# Patient Record
Sex: MALE | Race: OTHER | Employment: FULL TIME | URBAN - METROPOLITAN AREA
[De-identification: names, ages, dates, MRNs, and addresses within clinical notes are randomized per-mention and may not be internally consistent; named-entity substitution may affect disease eponyms.]

---

## 2020-06-07 ENCOUNTER — APPOINTMENT (OUTPATIENT)
Dept: GENERAL RADIOLOGY | Facility: HOSPITAL | Age: 50
DRG: 177 | End: 2020-06-07
Attending: STUDENT IN AN ORGANIZED HEALTH CARE EDUCATION/TRAINING PROGRAM
Payer: COMMERCIAL

## 2020-06-07 ENCOUNTER — HOSPITAL ENCOUNTER (INPATIENT)
Facility: HOSPITAL | Age: 50
LOS: 5 days | Discharge: HOME | DRG: 177 | End: 2020-06-12
Attending: STUDENT IN AN ORGANIZED HEALTH CARE EDUCATION/TRAINING PROGRAM | Admitting: HOSPITALIST
Payer: COMMERCIAL

## 2020-06-07 DIAGNOSIS — E11.9 TYPE 2 DIABETES MELLITUS WITHOUT COMPLICATION, WITHOUT LONG-TERM CURRENT USE OF INSULIN (HCC): ICD-10-CM

## 2020-06-07 DIAGNOSIS — R07.9 ACUTE CHEST PAIN: ICD-10-CM

## 2020-06-07 DIAGNOSIS — U07.1 COVID-19 VIRUS INFECTION: Primary | ICD-10-CM

## 2020-06-07 DIAGNOSIS — R09.02 HYPOXIA: ICD-10-CM

## 2020-06-07 PROBLEM — J12.82 PNEUMONIA DUE TO COVID-19 VIRUS: Status: ACTIVE | Noted: 2020-06-07

## 2020-06-07 PROCEDURE — 99223 1ST HOSP IP/OBS HIGH 75: CPT | Performed by: INTERNAL MEDICINE

## 2020-06-07 PROCEDURE — 71045 X-RAY EXAM CHEST 1 VIEW: CPT | Performed by: STUDENT IN AN ORGANIZED HEALTH CARE EDUCATION/TRAINING PROGRAM

## 2020-06-07 RX ORDER — GLIPIZIDE 5 MG/1
5 TABLET ORAL
COMMUNITY

## 2020-06-07 RX ORDER — ONDANSETRON 2 MG/ML
4 INJECTION INTRAMUSCULAR; INTRAVENOUS EVERY 6 HOURS PRN
Status: DISCONTINUED | OUTPATIENT
Start: 2020-06-07 | End: 2020-06-12

## 2020-06-07 RX ORDER — SODIUM CHLORIDE 9 MG/ML
INJECTION, SOLUTION INTRAVENOUS CONTINUOUS
Status: ACTIVE | OUTPATIENT
Start: 2020-06-07 | End: 2020-06-07

## 2020-06-07 RX ORDER — SODIUM CHLORIDE 9 MG/ML
125 INJECTION, SOLUTION INTRAVENOUS CONTINUOUS
Status: DISCONTINUED | OUTPATIENT
Start: 2020-06-07 | End: 2020-06-07

## 2020-06-07 RX ORDER — METOCLOPRAMIDE HYDROCHLORIDE 5 MG/ML
10 INJECTION INTRAMUSCULAR; INTRAVENOUS EVERY 8 HOURS PRN
Status: DISCONTINUED | OUTPATIENT
Start: 2020-06-07 | End: 2020-06-12

## 2020-06-07 RX ORDER — ENOXAPARIN SODIUM 100 MG/ML
40 INJECTION SUBCUTANEOUS DAILY
Status: DISCONTINUED | OUTPATIENT
Start: 2020-06-07 | End: 2020-06-07

## 2020-06-07 RX ORDER — DEXTROSE MONOHYDRATE 25 G/50ML
50 INJECTION, SOLUTION INTRAVENOUS
Status: DISCONTINUED | OUTPATIENT
Start: 2020-06-07 | End: 2020-06-12

## 2020-06-07 RX ORDER — SODIUM CHLORIDE 9 MG/ML
INJECTION, SOLUTION INTRAVENOUS ONCE
Status: DISCONTINUED | OUTPATIENT
Start: 2020-06-07 | End: 2020-06-12

## 2020-06-07 RX ORDER — ENOXAPARIN SODIUM 100 MG/ML
0.5 INJECTION SUBCUTANEOUS DAILY
Status: DISCONTINUED | OUTPATIENT
Start: 2020-06-07 | End: 2020-06-12

## 2020-06-07 NOTE — PROGRESS NOTES
NINO HOSPITALIST  Progress Note     Paulinafaiza Wades Patient Status:  Inpatient    1970 MRN [id number]   [facility] 3SW-A Attending Edith Guillen, [address] [facility] Day # 0 PCP No primary care provider on file.      Chief Complaint: SOB    S: Pa Insulin Aspart Pen  2-10 Units Subcutaneous TID CC and HS   • enoxaparin  0.5 mg/kg Subcutaneous Daily       ASSESSMENT / PLAN:     1. Hypoxia secondary to COVID19 viral pneumonia  1. S/p tocilizumab  2. Remdesivir  3. CCP  4.  Monitor inflammatory markers

## 2020-06-07 NOTE — H&P
NINO HOSPITALIST  History and Physical     Shaandilcia Geller Patient Status:  Emergency    1970 MRN [id number]   Location [address] [facility] Attending John Duque MD   Hosp Day # 0 PCP No primary care provider on file.       Disp: , Rfl:   benzonatate 100 MG Oral Cap, Take 1 capsule (100 mg total) by mouth 3 (three) times daily as needed. , Disp: 30 capsule, Rfl: 0        Review of Systems:   A comprehensive 14 point review of systems was completed.     Pertinent positives and n positioning  2. IS  3. Supplemental oxygen  3. Type II DM  1. Hold PO meds  2. Levemir/ISS  4. Morbid Obesity  1.  BMI ~48    Quality:  · DVT Prophylaxis: Lovenox  · CODE status: Full  · Dunbar: no    Plan of care discussed with patient/ED MD Gregory Mcgowan

## 2020-06-07 NOTE — CONSULTS
Pulmonary / Critical Care H&P/Consult       NAME: Claudene Apo - ROOM: [address] [place] [address] MRN: [id number] - Age: 44year old - :  1970    Date of Admission: 2020  3:18 AM  Admission Diagnosis: Hypoxia [R09.02]  Acute chest pain [R07.9]  Type 2 diabete Relationships      Social connections:        Talks on phone: Not on file        Gets together: Not on file        Attends [employer] service: Not on file        Active member of club or organization: Not on file        Attends meetings of clubs or [place] Output —   Net 1250 ml       /66 (BP Location: Left arm)   Pulse 78   Temp 98.3 °F (36.8 °C) (Oral)   Resp 24   Ht 5' 7\" (1.702 m)   Wt (!) 305 lb (138.3 kg)   SpO2 93%   BMI 47.77 kg/m²     General Appearance:    Alert, cooperative, no distress, infiltrates    ASSESSMENT/PLAN:    1. Dyspnea / hypoxia: secondary to covid-19 infection  - O2 as needed  - recommend that pt self prone; change positions q2 hours. 2. COVID-19 infection  - sx onset: 5/27;  Dx made 6/1  - exposures: no clear exposure  - O

## 2020-06-07 NOTE — ED PROVIDER NOTES
Patient Seen in: [facility] Emergency Department      History   Patient presents with:  Dyspnea MATIAS SOB  Cough/URI  Chest Pain Angina    Stated Complaint: matias/cough/chest pain with cough    HPI    Patient is a 47-year-old male with previous history well-nourished. HENT:   Head: Normocephalic and atraumatic. Eyes: Pupils are equal, round, and reactive to light. Neck: Normal range of motion. Neck supple. Cardiovascular: Normal rate, regular rhythm, normal heart sounds and intact distal pulses. infection and other pathology. Chest x-ray demonstrated findings consistent with extensive bilateral groundglass opacities consistent with severe lung involvement secondary to COVID-19 infection.     Laboratory evaluation was also undertaken, elevation i

## 2020-06-07 NOTE — ED INITIAL ASSESSMENT (HPI)
Pt reports testing + for COVID 19 6/1/20. Arrived with c/o cough with matias and chest pain with cough x couple episodes tonight.  Sats85% room air on arrival.

## 2020-06-07 NOTE — PLAN OF CARE
Assumed care of patient around 0730. Pt a/o x 4, requiring 3L nasal cannula this a.m. with increased to 5L during ambulation. Denies chest pain/discomfort or SOB. C/o \"itching\" to his chest. Encouraged IS use. Pt given Actemra x1. Remdesivir daily.  Dandy

## 2020-06-07 NOTE — PLAN OF CARE
NURSING ADMISSION NOTE      Patient admitted via Cart  Oriented to room. Safety precautions initiated. Bed in low position. Call light in reach. Assumed care around 0530. Pt A&Ox4. VSS. 3L O2 sating mid to upper 90's. NSR on tele.    C/o mild

## 2020-06-07 NOTE — CONSULTS
INFECTIOUS DISEASE CONSULT NOTE    Stefany Ellington Patient Status:  Inpatient    1970 MRN [id number]   [facility] 3SW-A Attending Malinda Pichardo, [address] [facility] Day # 0 PCP No primary car PRN  •  Metoclopramide HCl (REGLAN) injection 10 mg, 10 mg, Intravenous, Q8H PRN  •  insulin detemir (LEVEMIR) 100 UNIT/ML flextouch 10 Units, 10 Units, Subcutaneous, Daily  •  Insulin Aspart Pen (NOVOLOG) 100 UNIT/ML flexpen 2-10 Units, 2-10 Units, Subcut 28  --    ALT 34  --    BILT 0.7  --    TP 7.6  --        Microbiology    Reviewed in EMR    Radiology: Reviewed      Antibiotics:       ASSESSMENT:  44year old male w/ PMH sig for DMII who presented to ED on 6/7 with SOB.   Had sx starting 5/27 with cough

## 2020-06-07 NOTE — PROGRESS NOTES
[facility] Pharmacy Note:  Anticoagulation Weight Dose Adjustment for enoxaparin (LOVENOX)    Tay Garduno is a 44year old male who has been prescribed enoxaparin (LOVENOX) 40 mg every 24 hours.       Estimated Creatinine Clearance: 123.8 mL/min (A) (based on S

## 2020-06-08 ENCOUNTER — APPOINTMENT (OUTPATIENT)
Dept: CT IMAGING | Facility: HOSPITAL | Age: 50
DRG: 177 | End: 2020-06-08
Attending: INTERNAL MEDICINE
Payer: COMMERCIAL

## 2020-06-08 PROCEDURE — 30233L1 TRANSFUSION OF NONAUTOLOGOUS FRESH PLASMA INTO PERIPHERAL VEIN, PERCUTANEOUS APPROACH: ICD-10-PCS | Performed by: HOSPITALIST

## 2020-06-08 PROCEDURE — 71275 CT ANGIOGRAPHY CHEST: CPT | Performed by: INTERNAL MEDICINE

## 2020-06-08 PROCEDURE — 99232 SBSQ HOSP IP/OBS MODERATE 35: CPT | Performed by: HOSPITALIST

## 2020-06-08 NOTE — PROGRESS NOTES
NINO HOSPITALIST  Progress Note     Anurag Rowe Patient Status:  Inpatient    1970 MRN [id number]   [facility] 3SW-A Attending Marium Gordillo, [address] [facility] Day # 1 PCP No primary care provider on file.      Chief Complaint: SOB    S: Pa Epic.    Medications:   • remdesivir IVPB  100 mg Intravenous Q24H   • insulin detemir  10 Units Subcutaneous Daily   • Insulin Aspart Pen  2-10 Units Subcutaneous TID CC and HS   • enoxaparin  0.5 mg/kg Subcutaneous Daily   • sodium chloride   Intravenous

## 2020-06-08 NOTE — PROGRESS NOTES
[facility]                INFECTIOUS DISEASE PROGRESS NOTE    Sunil Zafar Patient Status:  Inpatient    1970 MRN [id number]   [facility] 3SW-A Attending Shabbir Segura, [address] [facility] Day # 1 PCP No primary care provider on file. [address] Encounter on 06/07/20   1.  BLOOD CULTURE     Status: None (Preliminary result)    Collection Time: 06/07/20  6:33 AM   Result Value Ref Range    Blood Culture Result No Growth 1 Day N/A         Radiology    CXR bilateral judie Left PTA s/p I&D at bedside  -Augmentin x 10 days  -Medrol dose pack  -FU in office if symptoms recur or worsen

## 2020-06-08 NOTE — PLAN OF CARE
Assumed care at [address] p ton bed, on O2 3l/NC sats >90  Encouraged proning. IS - 1500  Denies pain, mild SOB on exertion  On Remdesivir, need convalescent plasma  Labs in am  POC updated. Verbalized undertsnding    Problem: Diabetes/Glucose Control  Go signs/symptoms of CO2 retention  Outcome: Progressing     Problem: DISCHARGE PLANNING  Goal: Discharge to home or other facility with appropriate resources  Description  INTERVENTIONS:  - Identify barriers to discharge w/pt and caregiver  - Include patient

## 2020-06-08 NOTE — PAYOR COMM NOTE
--------------  ADMISSION REVIEW     Payor: [address] [place] [address] Drive #:  [id number]  Authorization Number: [id number]    Admit date: 6/7/20  Admit time: 0530       Admitting Physician: Paulino Perea MD  Attending Physician:  DO Pj Doherty Other systems are as noted in HPI. Constitutional and vital signs reviewed. All other systems reviewed and negative except as noted above.     Physical Exam     ED Triage Vitals [06/07/20 0325]   /64   Pulse 85   Resp 22   Temp 97.5 °F (36.4 °C) Please view results for these tests on the individual orders.    TROPONIN I   PROTHROMBIN TIME (PT)   PTT, ACTIVATED   D-DIMER   LDH   PROCALCITONIN   FERRITIN   C-REACTIVE PROTEIN   CK CREATINE KINASE (NOT CREATININE)   SCAN SLIDE   RAINBOW DRAW BLUE   FORTE Current Discharge Medication List                                    Signed by Chris Black MD on 6/7/2020  4:57 AM            H&P - H&P Note      H&P signed by Mick East DO at 6/7/2020  5:48 AM     Author:  Mick East DO Service:  — Author T Social History:  reports that he has never smoked. He has never used smokeless tobacco. He reports previous alcohol use. He reports that he does not use drugs.     Family History: Father with Type II DM    Allergies: No Known Allergies    Medications:  No c BILT 0.7   TP 7.6       Estimated Creatinine Clearance: 123.8 mL/min (A) (based on SCr of 0.66 mg/dL (L)).     Recent Labs   Lab 06/07/20 0334   PTP 13.3   INR 0.98       Recent Labs   Lab 06/07/20 0334   TROP <0.045   CK 83       Imaging: Imaging data re ED Consult to Infectious Disease [[id number]] ordered by Lincoln Auguste MD at 06/07/20 4993          Signed                                                                     INFECTIOUS DISEASE CONSULT NOTE           Shawnee Miles Patient Status:  Marlene Kiser •  glucose (DEX4) oral liquid 15 g, 15 g, Oral, Q15 Min PRN **OR** Glucose-Vitamin C (DEX-4) chewable tab 4 tablet, 4 tablet, Oral, Q15 Min PRN **OR** dextrose 50 % injection 50 mL, 50 mL, Intravenous, Q15 Min PRN **OR** glucose (DEX4) oral liquid 30 g, 30 Integument: No lesions.  No erythema.     Laboratory Data:         Recent Labs   Lab 06/07/20  0633   RBC 4.94   HGB 13.8   HCT 42.3   MCV 85.6   MCH 27.9   MCHC 32.6   RDW 12.5   NEPRELIM 4.84   WBC 6.9   .0           Recent Labs   Lab 06/07/20  033 Thank you for allowing me to participate in the care of this patient. Please do not hesitate to call if you have any questions.    I will continue to follow with you and will make further recommendations based on his progress.     Cortney Avery  6/7/202 Socioeconomic History      Marital status:       Spouse name: Not on file      Number of children: Not on file      Years of education: Not on file      Highest education level: Not on file    Occupational History      Not on file    Social Needs • Insulin Aspart Pen  2-10 Units Subcutaneous TID CC and HS   • enoxaparin  0.5 mg/kg Subcutaneous Daily      Continuous Infusing Medication:  PRN Medication:glucose **OR** Glucose-Vitamin C **OR** dextrose **OR** glucose **OR** Glucose-Vitamin C, ondanset Skin:   Skin color, texture, turgor normal, no rashes or lesions                 Recent Labs   Lab 06/07/20 0334 06/07/20  0633   WBC 8.7 6.9   HGB 14.2 13.8   HCT 43.8 42.3   .0 291.0          Recent Labs   Lab 06/07/20 0334   INR 0.98

## 2020-06-08 NOTE — PLAN OF CARE
Patient is alert and oriented, VSS, no c/o of pain, dyspnea upon exertion, O2 maintained between 93-95% on 3L NC. Up ad chelsi. Convalescent plasma administered, no s/s of adverse reaction. CTA chest completed, neg for PE, MD aware. All needs met.      Problem Manage/alleviate anxiety  - Monitor for signs/symptoms of CO2 retention  Outcome: Progressing     Problem: DISCHARGE PLANNING  Goal: Discharge to home or other facility with appropriate resources  Description  INTERVENTIONS:  - Identify barriers to dischar

## 2020-06-08 NOTE — PROGRESS NOTES
Lääne 62 Patient Status:  Inpatient    1970 MRN [id number]   [facility] 3SW-A Attending Dara Brock, [address] [facility] Day # 1 PCP No primary care provider on file. SUBJECTIVE: Pt afebrile.   Denies complaints toda                         Chest wall: No tenderness or deformity.                         GLENQ: GUEIYOY rate and rhythm, normal S1S2                          Abdomen: soft, non-tender, non-distended, positive BS.                           Extremity: No cl 5x ULN, will continue prophy dose lovenox   - s/p tocilizumab 6/7  - on remdesivir per ID  - awaiting convalescent plasma  - consider steriods if there is further deterioration. 3. Proph:  -lovenox  4.  Dispo:  -full code  -will follow     Jayne Esqueda

## 2020-06-09 PROCEDURE — 99232 SBSQ HOSP IP/OBS MODERATE 35: CPT | Performed by: HOSPITALIST

## 2020-06-09 RX ORDER — FLUTICASONE PROPIONATE 50 MCG
1 SPRAY, SUSPENSION (ML) NASAL DAILY
Status: DISCONTINUED | OUTPATIENT
Start: 2020-06-09 | End: 2020-06-12

## 2020-06-09 NOTE — PLAN OF CARE
Pt is AOx4, denies chest pain or dyspnea. He does desat with activity. He has a non-productive cough which he states is from post nasal drip related to his allergies. On 3L. QID accucheck. Up ad chelsi. Will monitor.     0115: O2 increased to 5L while a

## 2020-06-09 NOTE — PAYOR COMM NOTE
--------------  CONTINUED STAY REVIEW-----CLINICALS FOR 6/8      Payor: [address] [place] [address] Drive #:  [id number]  Authorization Number: [id number]    Admit date: 6/7/20  Admit time: [place]    Admitting Physician: Billy Macdonald MD  Attending Physician: •  Insulin Aspart Pen (NOVOLOG) 100 UNIT/ML flexpen 2-10 Units, 2-10 Units, Subcutaneous, TID CC and HS  •  Enoxaparin Sodium (LOVENOX) 80 MG/0.8ML injection 70 mg, 0.5 mg/kg, Subcutaneous, Daily  •  0.9% NaCl infusion, , Intravenous, Once      Physical Ex 3604 06/08/20  0533   CRP 14.40* 10.60*   ANNELISE 448.5 401.7   * 372*   DDIMER 1.79* 1.97*            Imaging:  I have independently visualized all relevant chest imaging in PACS.   I agree with the radiology interpretation except where noted.        AS Cardiovascular: S1, S2. Regular rate and rhythm. No murmurs, rubs or gallops. Abdomen: Soft, nontender, nondistended. Positive bowel sounds. No rebound or guarding. Neurologic: No focal neurological deficits. Musculoskeletal: Moves all extremities. At this point Mr. Marii Means is expected to be discharge to: [address] [place],9D of care discussed with patient and RN.     Francesca Hennessy,                           MEDICATIONS ADMINISTERED IN LAST 1 DAY:  Enoxaparin Sodium (LOVENOX) 80 MG/0.8ML injection 70 mg     D

## 2020-06-09 NOTE — PROGRESS NOTES
[facility]                INFECTIOUS DISEASE PROGRESS NOTE    Paulina Jones Patient Status:  Inpatient    1970 MRN [id number]   [facility] 3SW-A Attending Edith Guillen, [address] [facility] [address] Day # 2 PCP No primary care provider on file. 6:33 AM   Result Value Ref Range    Blood Culture Result No Growth 2 Days N/A         Radiology    CXR bilateral alveolar infiltrates    Problem list reviewed:  Patient Active Problem List:     FGBFD-05 virus infection     Hypoxia     Acute chest pain

## 2020-06-09 NOTE — PROGRESS NOTES
NINO HOSPITALIST  Progress Note     Griselda Russ Patient Status:  Inpatient    1970 MRN [id number]   [facility] 3SW-A Attending Bhavesh Saul, [address] [facility] Day # 2 PCP No primary care provider on file.      Chief Complaint: SOB    S: Pa INR 0.98       Recent Labs   Lab 06/07/20  0334   TROP <0.045   CK 83            Imaging: Imaging data reviewed in Epic.     Medications:   • Fluticasone Propionate  1 spray Each Nare Daily   • remdesivir IVPB  100 mg Intravenous Q24H   • insulin detemir

## 2020-06-09 NOTE — PLAN OF CARE
Assumed care of patient at 7am  AOx4 in NAD  Weaning of oxygen currently at 1L;   IS 2200  Lungs diminished; still with cough  Abdomen soft; voids;    Denies pain  Discharge planning ongoing; patient asking pertinent questions about discharge self quarantin interpreters to assist at discharge as needed  - Consider post-discharge preferences of patient/family/discharge partner  - Complete POLST form as appropriate  - Assess patient's ability to be responsible for managing their own health  - Refer to [facility]

## 2020-06-09 NOTE — PROGRESS NOTES
Lääne 62 Patient Status:  Inpatient    1970 MRN [id number]   [facility] 3SW-A Attending Rubén Broussard, 160[address] [facility] Day # 2 PCP No primary care provider on file. SUBJECTIVE: Pt states that SOB is improving.   Fe mucous membranes                          Lungs: Clear to auscultation bilaterally, no wheezes or crackles                           Chest wall: No tenderness or deformity.                           RDVMM: YJJRDFN rate and rhythm, normal S1S2                parenchymal disease from COVID  2. COVID-19 infection  - sx onset: 5/27;  Dx made 6/1  - exposures: no clear exposure  - O2 as above  - inflammatory labs downtrending   - d-dimer elevated although not > 5x ULN, will continue prophy dose lovenox   - s/p toci

## 2020-06-10 PROCEDURE — 99232 SBSQ HOSP IP/OBS MODERATE 35: CPT | Performed by: HOSPITALIST

## 2020-06-10 NOTE — PLAN OF CARE
Patient A/Ox4, VSS, denies pain or discomfort. Maintained O2 above 92% 1L NC, increased need for O2 NOC (up to 4L for LEONARD). Up ad chelsi, slight dyspnea, patient using I/S throughout day and noted that his post nasal drip has improved since using Flonase.  All

## 2020-06-10 NOTE — PROGRESS NOTES
Lääne 62 Patient Status:  Inpatient    1970 MRN [id number]   [facility] 3SW-A Attending Nany Isaac, [address] [facility] Day # 3 PCP No primary care provider on file. SUBJECTIVE: No acute events overnight.   He denie wall: No tenderness or deformity.                         DLTLL: EJMPLPT rate and rhythm, normal S1S2                          Abdomen: soft, non-tender, non-distended, positive BS.                         Extremity: No clubbing or cyanosis.  no edema     O2 as above  - inflammatory labs downtrending, LDH up a bit today   - d-dimer elevated although not > 5x ULN, will continue prophy dose lovenox   - s/p tocilizumab 6/7  - on remdesivir per ID, holding tomorrow due to rise in LFTs  - received convalescent p

## 2020-06-10 NOTE — PLAN OF CARE
Assumed care 1900  Patient alert and oriented x4  No complaints of pain  Normal sinus on tele  Oxygen needs varying- 5L required while sleeping and 1 L while awake  Blood sugars controlled   Remdesivir given  Will monitor

## 2020-06-10 NOTE — PROGRESS NOTES
[facility]                INFECTIOUS DISEASE PROGRESS NOTE    Francisco Khan Patient Status:  Inpatient    1970 MRN [id number]   [facility] 3SW-A Attending Lilliam Pop, [address] [facility] Day # 3 PCP No primary care provider on file. N/A         Radiology    CXR bilateral alveolar infiltrates    Problem list reviewed:  Patient Active Problem List:     NIHZC-08 virus infection     Hypoxia     Acute chest pain     Type 2 diabetes mellitus without complication, without long-term current u

## 2020-06-10 NOTE — PROGRESS NOTES
NINO HOSPITALIST  Progress Note     Flavio Chao Patient Status:  Inpatient    1970 MRN [id number]   [facility] 3SW-A Attending Basim Edward MD   Hosp Day # 3 PCP No primary care provider on file.      Chief Complaint: SOB    S: Pa COVID-19 Lab Results    COVID-19  No results found for: COVID19    Pro-Calcitonin  Recent Labs   Lab 06/07/20  0334   PCT 0.09       Cardiac  Recent Labs   Lab 06/07/20  0334 06/07/20  0633   TROP <0.045  --    PBNP  --  57       Creatinine Anusha

## 2020-06-11 PROCEDURE — 99232 SBSQ HOSP IP/OBS MODERATE 35: CPT | Performed by: HOSPITALIST

## 2020-06-11 NOTE — PROGRESS NOTES
NINO HOSPITALIST  Progress Note     Eric Bhat Patient Status:  Inpatient    1970 MRN [id number]   [facility] 3SW-A Attending Bhupinder Stevenson MD   Hosp Day # 4 PCP No primary care provider on file.      Chief Complaint: SOB    S: Pa INR 0.98            COVID-19 Lab Results    COVID-19  No results found for: COVID19    Pro-Calcitonin  Recent Labs   Lab 06/07/20  0334   PCT 0.09       Cardiac  Recent Labs   Lab 06/07/20  0334 06/07/20  0633   TROP <0.045  --    PBNP  --  57       Crea

## 2020-06-11 NOTE — PLAN OF CARE
RECEIVED PATIENT RESTING IN BED. PATIENT ALERT AND ORIENTED X4. VSS ON 1L O2.  LUNGS DIMINISHED. DENIES SOB AT REST. SOB WITH EXERTION IMPROVING PER PATIENT. SR ON TELE. DENIES CHEST PAIN. PATIENT UP AS TOLERATED. PLAN OF CARE UPDATED WITH PATIENT.

## 2020-06-11 NOTE — PROGRESS NOTES
[facility]                INFECTIOUS DISEASE PROGRESS NOTE    Francisco Khan Patient Status:  Inpatient    1970 MRN [id number]   [facility] 3SW-A Attending Lilliam Pop, [address] [facility] [address] Day # 4 PCP No primary care provider on file. 0.5   TP 7.0 6.9 7.1       No results found for: [facility] Encounter on 06/07/20   1.  BLOOD CULTURE     Status: None (Preliminary result)    Collection Time: 06/07/20  6:33 AM   Result Value Ref Range    Blood Culture Result No Growth

## 2020-06-11 NOTE — PLAN OF CARE
Patient is alert and oriented. Weaned to 1L NC, NSR on the tele. Denies any pain or SOB. POC updated with the patient, all questions answered. Call light within reach, will continue to monitor.

## 2020-06-12 VITALS
DIASTOLIC BLOOD PRESSURE: 85 MMHG | HEART RATE: 82 BPM | SYSTOLIC BLOOD PRESSURE: 144 MMHG | TEMPERATURE: 98 F | HEIGHT: 67 IN | OXYGEN SATURATION: 94 % | RESPIRATION RATE: 18 BRPM | WEIGHT: 295.88 LBS | BODY MASS INDEX: 46.44 KG/M2

## 2020-06-12 PROCEDURE — 99239 HOSP IP/OBS DSCHRG MGMT >30: CPT | Performed by: HOSPITALIST

## 2020-06-12 RX ORDER — FLUTICASONE PROPIONATE 50 MCG
1 SPRAY, SUSPENSION (ML) NASAL DAILY
Qty: 1 BOTTLE | Refills: 0 | Status: SHIPPED | OUTPATIENT
Start: 2020-06-13

## 2020-06-12 NOTE — PLAN OF CARE
Patient tele dc'd. Iv discontinued with catheter intact. Patient denies chest pain, sob, dizziness or palpations. Patient denies calf tenderness, patient discharge instructions reviewed with patient and verbalized understanding.  Patient medication reviewed

## 2020-06-12 NOTE — PROGRESS NOTES
NINO HOSPITALIST  Progress Note     Eric Bhat Patient Status:  Inpatient    1970 MRN [id number]   [facility] 3SW-A Attending Bhupinder Stevenson MD   Hosp Day # 5 PCP No primary care provider on file.      Chief Complaint: SOB    S: Pa Results    COVID-19  No results found for: COVID19    Pro-Calcitonin  Recent Labs   Lab 06/07/20  0334   PCT 0.09       Cardiac  Recent Labs   Lab 06/07/20  0334 06/07/20  0633   TROP <0.045  --    PBNP  --  57       Creatinine Kinase  Recent Labs   Lab 06

## 2020-06-12 NOTE — DISCHARGE SUMMARY
NINO HOSPITALIST  DISCHARGE SUMMARY     Flavio Chao Patient Status:  Inpatient    1970 MRN [id number]   [facility] 5NW-A Attending No att. providers found   [facility] Day # 5 PCP No primary care provider on file.      Date of Admission: discharge from the hospital; Still recommend for TCM follow-up.     Procedures during hospitalization:   • none    Incidental or significant findings and recommendations (brief descriptions):  • As above    Lab/Test results pending at Discharge:   · none to auscultation bilaterally. No wheezes. No rhonchi. Cardiovascular: S1, S2. Regular rate and rhythm. No murmurs, rubs or gallops. Abdomen: Soft, nontender, nondistended. Positive bowel sounds. No rebound or guarding.   Neurologic: No focal neurological

## 2020-06-12 NOTE — PROGRESS NOTES
[address] 62 Patient Status:  Inpatient    1970 MRN [id number]   [facility] 3SW-A Attending Maggie Le, [address] Day # 5 PCP No primary care provider on file.      SUBJECTIVE: Pt tolerated RA all day yesterday, d Trochanteric Bursitis: Exercises  Your Care Instructions  Here are some examples of typical rehabilitation exercises for your condition. Start each exercise slowly. Ease off the exercise if you start to have pain. Your doctor or physical therapist will tell you when you can start these exercises and which ones will work best for you. How to do the exercises  Hamstring wall stretch    1. Lie on your back in a doorway, with your good leg through the open door. 2. Slide your affected leg up the wall to straighten your knee. You should feel a gentle stretch down the back of your leg. 1. Do not arch your back. 2. Do not bend either knee. 3. Keep one heel touching the floor and the other heel touching the wall. Do not point your toes. 3. Hold the stretch for at least 1 minute to begin. Then try to lengthen the time you hold the stretch to as long as 6 minutes. 4. Repeat 2 to 4 times. 5. If you do not have a place to do this exercise in a doorway, there is another way to do it:  6. Lie on your back, and bend the knee of your affected leg. 7. Loop a towel under the ball and toes of that foot, and hold the ends of the towel in your hands. 8. Straighten your knee, and slowly pull back on the towel. You should feel a gentle stretch down the back of your leg. 9. Hold the stretch for 15 to 30 seconds. Or even better, hold the stretch for 1 minute if you can. 10. Repeat 2 to 4 times. Straight-leg raises to the outside    1. Lie on your side, with your affected leg on top. 2. Tighten the front thigh muscles of your top leg to keep your knee straight. 3. Keep your hip and your leg straight in line with the rest of your body, and keep your knee pointing forward. Do not drop your hip back. 4. Lift your top leg straight up toward the ceiling, about 12 inches off the floor. Hold for about 6 seconds, then slowly lower your leg. 5. Repeat 8 to 12 times. Clamshell    1.  Lie on your side, with your affected leg on membranes                          Lungs: Clear to auscultation bilaterally, no wheezes or crackles                           Chest wall: No tenderness or deformity.                           HKWFU: ZTUJAWR rate and rhythm, normal S1S2                       top and your head propped on a pillow. Keep your feet and knees together and your knees bent. 2. Raise your top knee, but keep your feet together. Do not let your hips roll back. Your legs should open up like a clamshell. 3. Hold for 6 seconds. 4. Slowly lower your knee back down. Rest for 10 seconds. 5. Repeat 8 to 12 times. Standing quadriceps stretch    1. If you are not steady on your feet, hold on to a chair, counter, or wall. You can also lie on your stomach or your side to do this exercise. 2. Bend the knee of the leg you want to stretch, and reach behind you to grab the front of your foot or ankle with the hand on the same side. For example, if you are stretching your right leg, use your right hand. 3. Keeping your knees next to each other, pull your foot toward your buttock until you feel a gentle stretch across the front of your hip and down the front of your thigh. Your knee should be pointed directly to the ground, and not out to the side. 4. Hold the stretch for 15 to 30 seconds. 5. Repeat 2 to 4 times. Piriformis stretch    1. Lie on your back with your legs straight. 2. Lift your affected leg and bend your knee. With your opposite hand, reach across your body, and then gently pull your knee toward your opposite shoulder. 3. Hold the stretch for 15 to 30 seconds. 4. Repeat 2 to 4 times. Double knee-to-chest    1. Lie on your back with your knees bent and your feet flat on the floor. You can put a small pillow under your head and neck if it is more comfortable. 2. Bring both knees to your chest.  3. Keep your lower back pressed to the floor. Hold for 15 to 30 seconds. 4. Relax, and lower your knees to the starting position. 5. Repeat 2 to 4 times. Follow-up care is a key part of your treatment and safety. Be sure to make and go to all appointments, and call your doctor if you are having problems.  It's also a good idea to know your test results and keep a list of the medicines you take.  Where can you learn more? Go to http://charles-kristin.info/. Enter P262 in the search box to learn more about \"Trochanteric Bursitis: Exercises. \"  Current as of: March 21, 2017  Content Version: 11.4  © 3113-3146 [employer]. Care instructions adapted under license by [employer] (which disclaims liability or warranty for this information). If you have questions about a medical condition or this instruction, always ask your healthcare professional. [place] [address] any warranty or liability for your use of this information. Sacroiliac Pain: Exercises  Your Care Instructions  Here are some examples of typical rehabilitation exercises for your condition. Start each exercise slowly. Ease off the exercise if you start to have pain. Your doctor or physical therapist will tell you when you can start these exercises and which ones will work best for you. How to do the exercises  Knee-to-chest stretch    11. Do not do the knee-to-chest exercise if it causes or increases back or leg pain. 12. Lie on your back with your knees bent and your feet flat on the floor. You can put a small pillow under your head and neck if it is more comfortable. 15. Grasp your hands under one knee and bring the knee to your chest, keeping the other foot flat on the floor. 14. Keep your lower back pressed to the floor. Hold for at least 15 to 30 seconds. 15. Relax and lower the knee to the starting position. Repeat with the other leg. 16. Repeat 2 to 4 times with each leg. 17. To get more stretch, keep your other leg flat on the floor while pulling your knee to your chest.  Bridging    6. Lie on your back with both knees bent. Your knees should be bent about 90 degrees. 7. Tighten your belly muscles by pulling in your belly button toward your spine.  Then push your feet into the floor, squeeze your buttocks, and lift your hips off the floor until your shoulders, hips, will continue prophy dose lovenox   - s/p tocilizumab 6/7  - on remdesivir per ID, holding due to rise in LFTs  - received convalescent plasma 6/9  3. LEONARD:  -noncompliant with CPAP as OP, hold for now given COVID   4. Proph:  -lovenox  5.  Dispo:  -full cod and knees are all in a straight line. 8. Hold for about 6 seconds as you continue to breathe normally, and then slowly lower your hips back down to the floor and rest for up to 10 seconds. 9. Repeat 8 to 12 times. Hip extension    6. Get down on your hands and knees on the floor. 7. Keeping your back and neck straight, lift one leg straight out behind you. When you lift your leg, keep your hips level. Don't let your back twist, and don't let your hip drop toward the floor. 8. Hold for 6 seconds. Repeat 8 to 12 times with each leg. 9. If you feel steady and strong when you do this exercise, you can make it more difficult. To do this, when you lift your leg, also lift the opposite arm straight out in front of you. For example, lift the left leg and the right arm at the same time. (This is sometimes called the \"bird dog exercise. \") Hold for 6 seconds, and repeat 8 to 12 times on each side. Clamshell    6. Lie on your side with a pillow under your head. Keep your feet and knees together and your knees bent. 7. Raise your top knee, but keep your feet together. Do not let your hips roll back. Your legs should open up like a clamshell. 8. Hold for 6 seconds. 9. Slowly lower your knee back down. Rest for 10 seconds. 10. Repeat 8 to 12 times. 11. Switch to your other side and repeat steps 1 through 5. Hamstring wall stretch    5. Lie on your back in a doorway, with one leg through the open door. 6. Slide your affected leg up the wall to straighten your knee. You should feel a gentle stretch down the back of your leg. 1. Do not arch your back. 2. Do not bend either knee. 3. Keep one heel touching the floor and the other heel touching the wall. Do not point your toes. 7. Hold the stretch for at least 1 minute to begin. Then try to lengthen the time you hold the stretch to as long as 6 minutes. 8. Switch legs, and repeat steps 1 through 3.  9. Repeat 2 to 4 times.   10. If you do not have a place to do this exercise in a doorway, there is another way to do it:  11. Lie on your back, and bend one knee. 12. Loop a towel under the ball and toes of that foot, and hold the ends of the towel in your hands. 13. Straighten your knee, and slowly pull back on the towel. You should feel a gentle stretch down the back of your leg. 14. Switch legs, and repeat steps 1 through 3.  15. Repeat 2 to 4 times. Lower abdominal strengthening    6. Lie on your back with your knees bent and your feet flat on the floor. 7. Tighten your belly muscles by pulling your belly button in toward your spine. 8. Lift one foot off the floor and bring your knee toward your chest, so that your knee is straight above your hip and your leg is bent like the letter \"L. \"  9. Lift the other knee up to the same position. 10. Lower one leg at a time to the starting position. 11. Keep alternating legs until you have lifted each leg 8 to 12 times. 12. Be sure to keep your belly muscles tight and your back still as you are moving your legs. Be sure to breathe normally. Piriformis stretch    1. Lie on your back with your legs straight. 2. Lift your affected leg, and bend your knee. With your opposite hand, reach across your body, and then gently pull your knee toward your opposite shoulder. 3. Hold the stretch for 15 to 30 seconds. 4. Switch legs and repeat steps 1 through 3.  5. Repeat 2 to 4 times. Follow-up care is a key part of your treatment and safety. Be sure to make and go to all appointments, and call your doctor if you are having problems. It's also a good idea to know your test results and keep a list of the medicines you take. Where can you learn more? Go to http://charles-kristin.info/. Enter U002 in the search box to learn more about \"Sacroiliac Pain: Exercises. \"  Current as of: March 21, 2017  Content Version: 11.4  © 6593-0646 [employer].  Care instructions adapted under license by [employer] (which disclaims liability or warranty for this information). If you have questions about a medical condition or this instruction, always ask your healthcare professional. [address] any warranty or liability for your use of this information.

## 2020-06-12 NOTE — PLAN OF CARE
Patient is alert and oriented. On room air, NSR on the tele. At night still needing oxygen. Denies any pain or SOB. Possible discharge today. POC updated with the patient, all questions answered. Call light within reach, will continue to monitor.

## 2020-06-12 NOTE — PROGRESS NOTES
[facility]                INFECTIOUS DISEASE PROGRESS NOTE    Evon J[address] Patient Status:  Inpatient    1970 MRN [id number]   [facility] 3SW-A Attending Yael Haley, [address] [facility] Day # 5 PCP No primary care provider on file. BILT 0.5 0.5 0.6   TP 6.9 7.1 7.0       No results found for: [facility] Encounter on 06/07/20   1.  BLOOD CULTURE     Status: None    Collection Time: 06/07/20  6:33 AM   Result Value Ref Range    Blood Culture Result No Growth 5 Day

## 2020-06-12 NOTE — PLAN OF CARE
Patient is A&O x4.  Calm and cooperative. VSS. On tele-NSR. Weaned to RA. Although during night desatting put on 2L O2-Hx of LEONARD. IV-SL. Denies any pain or discomfort. Accuchecks. Electrolyte protocol.    Educated about using I.S and therapeutic pr Assess the need for suctioning and perform as needed  - Assess and instruct to report SOB or any respiratory difficulty  - Respiratory Therapy support as indicated  - Manage/alleviate anxiety  - Monitor for signs/symptoms of CO2 retention  Outcome: Lachelle

## 2020-06-15 NOTE — PAYOR COMM NOTE
--------------  DISCHARGE REVIEW    Payor: [address] [place] [address] Drive #:  [id number]  Authorization Number: [id number]    Admit date: 6/7/20  Admit time:  0530  Discharge Date: 6/12/2020  1:52 PM     Admitting Physician: Madina Fonseca MD  Attending Sami and whatRemdesivir the PICC line. His symptoms significantly improved. He an elevation of his liver enzymes and therefore 5th dose of Remdesivir was held. He was cleared for discharge per ID and pulmonary and discharged home in stable condition.     Joana telemedicine visit     Giovanni Ford  [address] [place] [address]  [phone]    Go on 6/24/2020  @2:15pm    Appointments for Next 30 Days 6/12/2020 - 7/12/2020    None          Vital signs:  Temp:  [98.1 °F (36.7 °C)-98.7 °F (37.1 °C)] 98.2

## 2023-04-18 ENCOUNTER — HOSPITAL ENCOUNTER (EMERGENCY)
Facility: HOSPITAL | Age: 53
Discharge: HOME | End: 2023-04-18
Attending: EMERGENCY MEDICINE
Payer: COMMERCIAL

## 2023-04-18 ENCOUNTER — APPOINTMENT (OUTPATIENT)
Dept: GENERAL RADIOLOGY | Facility: HOSPITAL | Age: 53
End: 2023-04-18
Payer: COMMERCIAL

## 2023-04-18 ENCOUNTER — PATIENT OUTREACH (OUTPATIENT)
Dept: CASE MANAGEMENT | Age: 53
End: 2023-04-18

## 2023-04-18 VITALS
HEIGHT: 67 IN | OXYGEN SATURATION: 97 % | HEART RATE: 88 BPM | WEIGHT: 295 LBS | TEMPERATURE: 96 F | SYSTOLIC BLOOD PRESSURE: 143 MMHG | RESPIRATION RATE: 14 BRPM | BODY MASS INDEX: 46.3 KG/M2 | DIASTOLIC BLOOD PRESSURE: 90 MMHG

## 2023-04-18 DIAGNOSIS — R07.89 CHEST WALL PAIN: Primary | ICD-10-CM

## 2023-04-18 DIAGNOSIS — R73.9 HYPERGLYCEMIA: ICD-10-CM

## 2023-04-18 LAB
ALBUMIN SERPL-MCNC: 3.3 G/DL (ref 3.4–5)
ALBUMIN/GLOB SERPL: 0.8 {RATIO} (ref 1–2)
ALP LIVER SERPL-CCNC: 121 U/L
ALT SERPL-CCNC: 37 U/L
ANION GAP SERPL CALC-SCNC: 6 MMOL/L (ref 0–18)
AST SERPL-CCNC: 19 U/L (ref 15–37)
ATRIAL RATE: 93 BPM
BASOPHILS # BLD AUTO: 0.05 X10(3) UL (ref 0–0.2)
BASOPHILS NFR BLD AUTO: 0.5 %
BILIRUB SERPL-MCNC: 0.7 MG/DL (ref 0.1–2)
BUN BLD-MCNC: 12 MG/DL (ref 7–18)
CALCIUM BLD-MCNC: 8.7 MG/DL (ref 8.5–10.1)
CHLORIDE SERPL-SCNC: 100 MMOL/L (ref 98–112)
CK SERPL-CCNC: 152 U/L
CO2 SERPL-SCNC: 27 MMOL/L (ref 21–32)
CREAT BLD-MCNC: 0.84 MG/DL
EOSINOPHIL # BLD AUTO: 0.11 X10(3) UL (ref 0–0.7)
EOSINOPHIL NFR BLD AUTO: 1.2 %
ERYTHROCYTE [DISTWIDTH] IN BLOOD BY AUTOMATED COUNT: 11.9 %
GFR SERPLBLD BASED ON 1.73 SQ M-ARVRAT: 104 ML/MIN/1.73M2 (ref 60–?)
GLOBULIN PLAS-MCNC: 4 G/DL (ref 2.8–4.4)
GLUCOSE BLD-MCNC: 384 MG/DL (ref 70–99)
HCT VFR BLD AUTO: 45.5 %
HGB BLD-MCNC: 15.7 G/DL
IMM GRANULOCYTES # BLD AUTO: 0.02 X10(3) UL (ref 0–1)
IMM GRANULOCYTES NFR BLD: 0.2 %
LYMPHOCYTES # BLD AUTO: 2.73 X10(3) UL (ref 1–4)
LYMPHOCYTES NFR BLD AUTO: 29.6 %
MCH RBC QN AUTO: 28 PG (ref 26–34)
MCHC RBC AUTO-ENTMCNC: 34.5 G/DL (ref 31–37)
MCV RBC AUTO: 81.3 FL
MONOCYTES # BLD AUTO: 0.54 X10(3) UL (ref 0.1–1)
MONOCYTES NFR BLD AUTO: 5.9 %
NEUTROPHILS # BLD AUTO: 5.78 X10 (3) UL (ref 1.5–7.7)
NEUTROPHILS # BLD AUTO: 5.78 X10(3) UL (ref 1.5–7.7)
NEUTROPHILS NFR BLD AUTO: 62.6 %
OSMOLALITY SERPL CALC.SUM OF ELEC: 292 MOSM/KG (ref 275–295)
P AXIS: 24 DEGREES
P-R INTERVAL: 156 MS
PLATELET # BLD AUTO: 201 10(3)UL (ref 150–450)
POTASSIUM SERPL-SCNC: 3.8 MMOL/L (ref 3.5–5.1)
PROT SERPL-MCNC: 7.3 G/DL (ref 6.4–8.2)
Q-T INTERVAL: 354 MS
QRS DURATION: 74 MS
QTC CALCULATION (BEZET): 440 MS
R AXIS: 12 DEGREES
RBC # BLD AUTO: 5.6 X10(6)UL
SODIUM SERPL-SCNC: 133 MMOL/L (ref 136–145)
T AXIS: 66 DEGREES
TROPONIN I HIGH SENSITIVITY: 14 NG/L
VENTRICULAR RATE: 93 BPM
WBC # BLD AUTO: 9.2 X10(3) UL (ref 4–11)

## 2023-04-18 PROCEDURE — 85025 COMPLETE CBC W/AUTO DIFF WBC: CPT

## 2023-04-18 PROCEDURE — 80053 COMPREHEN METABOLIC PANEL: CPT | Performed by: EMERGENCY MEDICINE

## 2023-04-18 PROCEDURE — 93010 ELECTROCARDIOGRAM REPORT: CPT

## 2023-04-18 PROCEDURE — 36415 COLL VENOUS BLD VENIPUNCTURE: CPT

## 2023-04-18 PROCEDURE — 85025 COMPLETE CBC W/AUTO DIFF WBC: CPT | Performed by: EMERGENCY MEDICINE

## 2023-04-18 PROCEDURE — 84484 ASSAY OF TROPONIN QUANT: CPT

## 2023-04-18 PROCEDURE — 82550 ASSAY OF CK (CPK): CPT | Performed by: EMERGENCY MEDICINE

## 2023-04-18 PROCEDURE — 80053 COMPREHEN METABOLIC PANEL: CPT

## 2023-04-18 PROCEDURE — 84484 ASSAY OF TROPONIN QUANT: CPT | Performed by: EMERGENCY MEDICINE

## 2023-04-18 PROCEDURE — 93005 ELECTROCARDIOGRAM TRACING: CPT

## 2023-04-18 PROCEDURE — 99285 EMERGENCY DEPT VISIT HI MDM: CPT

## 2023-04-18 PROCEDURE — 99284 EMERGENCY DEPT VISIT MOD MDM: CPT

## 2023-04-18 PROCEDURE — 71045 X-RAY EXAM CHEST 1 VIEW: CPT

## 2023-04-18 RX ORDER — IBUPROFEN 600 MG/1
600 TABLET ORAL EVERY 8 HOURS PRN
Qty: 21 TABLET | Refills: 0 | Status: SHIPPED | OUTPATIENT
Start: 2023-04-18 | End: 2023-04-25

## 2023-04-18 RX ORDER — IBUPROFEN 600 MG/1
600 TABLET ORAL ONCE
Status: COMPLETED | OUTPATIENT
Start: 2023-04-18 | End: 2023-04-18

## 2023-04-18 NOTE — PROGRESS NOTES
VM received; pt requesting assistance w/scheduling apt (dc 04/18)    Dr Jayne Lucia  [address]  [phone]  Follow up 2 days

## 2023-04-19 NOTE — PROGRESS NOTES
Dr Majano Police  [address]  [phone]  Follow up 2 days  Apt: April 24 @4:30pm     Confirmed w/ pt  Closing encounter

## 2023-08-09 ENCOUNTER — HOSPITAL ENCOUNTER (EMERGENCY)
Facility: HOSPITAL | Age: 53
Discharge: HOME | End: 2023-08-09
Attending: EMERGENCY MEDICINE
Payer: COMMERCIAL

## 2023-08-09 ENCOUNTER — APPOINTMENT (OUTPATIENT)
Dept: GENERAL RADIOLOGY | Facility: HOSPITAL | Age: 53
End: 2023-08-09
Attending: EMERGENCY MEDICINE
Payer: COMMERCIAL

## 2023-08-09 VITALS
BODY MASS INDEX: 46 KG/M2 | RESPIRATION RATE: 16 BRPM | WEIGHT: 293.19 LBS | DIASTOLIC BLOOD PRESSURE: 63 MMHG | HEART RATE: 90 BPM | SYSTOLIC BLOOD PRESSURE: 115 MMHG | OXYGEN SATURATION: 98 % | TEMPERATURE: 99 F

## 2023-08-09 DIAGNOSIS — D72.829 LEUKOCYTOSIS, UNSPECIFIED TYPE: ICD-10-CM

## 2023-08-09 DIAGNOSIS — R73.9 HYPERGLYCEMIA: ICD-10-CM

## 2023-08-09 DIAGNOSIS — I50.9 ACUTE ON CHRONIC CONGESTIVE HEART FAILURE, UNSPECIFIED HEART FAILURE TYPE (HCC): ICD-10-CM

## 2023-08-09 DIAGNOSIS — R06.02 SHORTNESS OF BREATH: Primary | ICD-10-CM

## 2023-08-09 DIAGNOSIS — J18.9 PNEUMONIA OF LEFT LOWER LOBE DUE TO INFECTIOUS ORGANISM: ICD-10-CM

## 2023-08-09 LAB
ALBUMIN SERPL-MCNC: 3 G/DL (ref 3.4–5)
ALBUMIN/GLOB SERPL: 0.7 {RATIO} (ref 1–2)
ALP LIVER SERPL-CCNC: 123 U/L
ALT SERPL-CCNC: 21 U/L
ANION GAP SERPL CALC-SCNC: 6 MMOL/L (ref 0–18)
AST SERPL-CCNC: 15 U/L (ref 15–37)
ATRIAL RATE: 110 BPM
BASOPHILS # BLD AUTO: 0.06 X10(3) UL (ref 0–0.2)
BASOPHILS NFR BLD AUTO: 0.5 %
BILIRUB SERPL-MCNC: 0.9 MG/DL (ref 0.1–2)
BUN BLD-MCNC: 16 MG/DL (ref 7–18)
CALCIUM BLD-MCNC: 8.5 MG/DL (ref 8.5–10.1)
CHLORIDE SERPL-SCNC: 100 MMOL/L (ref 98–112)
CO2 SERPL-SCNC: 27 MMOL/L (ref 21–32)
CREAT BLD-MCNC: 0.86 MG/DL
EGFRCR SERPLBLD CKD-EPI 2021: 102 ML/MIN/1.73M2 (ref 60–?)
EOSINOPHIL # BLD AUTO: 0.11 X10(3) UL (ref 0–0.7)
EOSINOPHIL NFR BLD AUTO: 0.8 %
ERYTHROCYTE [DISTWIDTH] IN BLOOD BY AUTOMATED COUNT: 12 %
FLUAV + FLUBV RNA SPEC NAA+PROBE: NEGATIVE
FLUAV + FLUBV RNA SPEC NAA+PROBE: NEGATIVE
GLOBULIN PLAS-MCNC: 4.5 G/DL (ref 2.8–4.4)
GLUCOSE BLD-MCNC: 234 MG/DL (ref 70–99)
GLUCOSE BLD-MCNC: 286 MG/DL (ref 70–99)
GLUCOSE BLD-MCNC: 316 MG/DL (ref 70–99)
GLUCOSE BLD-MCNC: 330 MG/DL (ref 70–99)
HCT VFR BLD AUTO: 44.2 %
HGB BLD-MCNC: 14.7 G/DL
IMM GRANULOCYTES # BLD AUTO: 0.04 X10(3) UL (ref 0–1)
IMM GRANULOCYTES NFR BLD: 0.3 %
LACTATE SERPL-SCNC: 1.1 MMOL/L (ref 0.4–2)
LYMPHOCYTES # BLD AUTO: 2.52 X10(3) UL (ref 1–4)
LYMPHOCYTES NFR BLD AUTO: 19.4 %
MCH RBC QN AUTO: 28.3 PG (ref 26–34)
MCHC RBC AUTO-ENTMCNC: 33.3 G/DL (ref 31–37)
MCV RBC AUTO: 85.2 FL
MONOCYTES # BLD AUTO: 1.33 X10(3) UL (ref 0.1–1)
MONOCYTES NFR BLD AUTO: 10.2 %
NEUTROPHILS # BLD AUTO: 8.93 X10 (3) UL (ref 1.5–7.7)
NEUTROPHILS # BLD AUTO: 8.93 X10(3) UL (ref 1.5–7.7)
NEUTROPHILS NFR BLD AUTO: 68.8 %
OSMOLALITY SERPL CALC.SUM OF ELEC: 290 MOSM/KG (ref 275–295)
P AXIS: 25 DEGREES
P-R INTERVAL: 128 MS
PLATELET # BLD AUTO: 267 10(3)UL (ref 150–450)
POTASSIUM SERPL-SCNC: 3.7 MMOL/L (ref 3.5–5.1)
PROT SERPL-MCNC: 7.5 G/DL (ref 6.4–8.2)
Q-T INTERVAL: 328 MS
QRS DURATION: 76 MS
QTC CALCULATION (BEZET): 443 MS
R AXIS: 53 DEGREES
RBC # BLD AUTO: 5.19 X10(6)UL
RSV RNA SPEC NAA+PROBE: NEGATIVE
SARS-COV-2 RNA RESP QL NAA+PROBE: NOT DETECTED
SODIUM SERPL-SCNC: 133 MMOL/L (ref 136–145)
T AXIS: -7 DEGREES
VENTRICULAR RATE: 110 BPM
WBC # BLD AUTO: 13 X10(3) UL (ref 4–11)

## 2023-08-09 PROCEDURE — 96374 THER/PROPH/DIAG INJ IV PUSH: CPT

## 2023-08-09 PROCEDURE — 85025 COMPLETE CBC W/AUTO DIFF WBC: CPT | Performed by: EMERGENCY MEDICINE

## 2023-08-09 PROCEDURE — 83605 ASSAY OF LACTIC ACID: CPT | Performed by: EMERGENCY MEDICINE

## 2023-08-09 PROCEDURE — 94640 AIRWAY INHALATION TREATMENT: CPT

## 2023-08-09 PROCEDURE — 80053 COMPREHEN METABOLIC PANEL: CPT | Performed by: EMERGENCY MEDICINE

## 2023-08-09 PROCEDURE — 82962 GLUCOSE BLOOD TEST: CPT

## 2023-08-09 PROCEDURE — 93005 ELECTROCARDIOGRAM TRACING: CPT

## 2023-08-09 PROCEDURE — 93010 ELECTROCARDIOGRAM REPORT: CPT

## 2023-08-09 PROCEDURE — 36415 COLL VENOUS BLD VENIPUNCTURE: CPT

## 2023-08-09 PROCEDURE — 96375 TX/PRO/DX INJ NEW DRUG ADDON: CPT

## 2023-08-09 PROCEDURE — 71045 X-RAY EXAM CHEST 1 VIEW: CPT | Performed by: EMERGENCY MEDICINE

## 2023-08-09 PROCEDURE — 87040 BLOOD CULTURE FOR BACTERIA: CPT | Performed by: EMERGENCY MEDICINE

## 2023-08-09 PROCEDURE — 99285 EMERGENCY DEPT VISIT HI MDM: CPT

## 2023-08-09 PROCEDURE — 96361 HYDRATE IV INFUSION ADD-ON: CPT

## 2023-08-09 PROCEDURE — 0241U SARS-COV-2/FLU A AND B/RSV BY PCR (GENEXPERT): CPT | Performed by: EMERGENCY MEDICINE

## 2023-08-09 RX ORDER — INSULIN ASPART 100 [IU]/ML
0.15 INJECTION, SOLUTION INTRAVENOUS; SUBCUTANEOUS ONCE
Status: COMPLETED | OUTPATIENT
Start: 2023-08-09 | End: 2023-08-09

## 2023-08-09 RX ORDER — KETOROLAC TROMETHAMINE 15 MG/ML
15 INJECTION, SOLUTION INTRAMUSCULAR; INTRAVENOUS ONCE
Status: COMPLETED | OUTPATIENT
Start: 2023-08-09 | End: 2023-08-09

## 2023-08-09 RX ORDER — IBUPROFEN 600 MG/1
600 TABLET ORAL EVERY 8 HOURS PRN
Qty: 30 TABLET | Refills: 0 | Status: ON HOLD | OUTPATIENT
Start: 2023-08-09

## 2023-08-09 RX ORDER — LEVOFLOXACIN 750 MG/1
750 TABLET ORAL ONCE
Status: COMPLETED | OUTPATIENT
Start: 2023-08-09 | End: 2023-08-09

## 2023-08-09 RX ORDER — ALBUTEROL SULFATE 90 UG/1
AEROSOL, METERED RESPIRATORY (INHALATION)
Status: DISCONTINUED
Start: 2023-08-09 | End: 2023-08-09

## 2023-08-09 RX ORDER — FUROSEMIDE 10 MG/ML
40 INJECTION INTRAMUSCULAR; INTRAVENOUS ONCE
Status: COMPLETED | OUTPATIENT
Start: 2023-08-09 | End: 2023-08-09

## 2023-08-09 RX ORDER — ALBUTEROL SULFATE 90 UG/1
2 AEROSOL, METERED RESPIRATORY (INHALATION) EVERY 4 HOURS PRN
Qty: 1 EACH | Refills: 0 | Status: ON HOLD | OUTPATIENT
Start: 2023-08-09 | End: 2023-09-08

## 2023-08-09 RX ORDER — LEVOFLOXACIN 750 MG/1
750 TABLET ORAL DAILY
Qty: 10 TABLET | Refills: 0 | Status: ON HOLD | OUTPATIENT
Start: 2023-08-09 | End: 2023-08-23

## 2023-08-09 RX ORDER — ALBUTEROL SULFATE 90 UG/1
2 AEROSOL, METERED RESPIRATORY (INHALATION) ONCE
Status: DISCONTINUED | OUTPATIENT
Start: 2023-08-09 | End: 2023-08-09

## 2023-08-09 RX ORDER — ALBUTEROL SULFATE 90 UG/1
2 AEROSOL, METERED RESPIRATORY (INHALATION) 4 TIMES DAILY
Status: DISCONTINUED | OUTPATIENT
Start: 2023-08-09 | End: 2023-08-09

## 2023-08-09 NOTE — ED INITIAL ASSESSMENT (HPI)
Pt arrives with c/o ANA. Pt was dx with a URI a few days ago. Took a dose pack and woke up with left side chest wall pain and ANA. Pt also states his BS is high.

## 2023-08-14 ENCOUNTER — APPOINTMENT (OUTPATIENT)
Dept: CT IMAGING | Facility: HOSPITAL | Age: 53
End: 2023-08-14
Attending: EMERGENCY MEDICINE
Payer: COMMERCIAL

## 2023-08-14 ENCOUNTER — APPOINTMENT (OUTPATIENT)
Dept: GENERAL RADIOLOGY | Facility: HOSPITAL | Age: 53
End: 2023-08-14
Payer: COMMERCIAL

## 2023-08-14 ENCOUNTER — HOSPITAL ENCOUNTER (INPATIENT)
Facility: HOSPITAL | Age: 53
LOS: 23 days | Discharge: HOME HEALTH | End: 2023-09-06
Attending: EMERGENCY MEDICINE | Admitting: INTERNAL MEDICINE
Payer: COMMERCIAL

## 2023-08-14 DIAGNOSIS — J85.0 NECROTIZING PNEUMONIA (HCC): Primary | ICD-10-CM

## 2023-08-14 DIAGNOSIS — B40.9 BLASTOMYCOSIS: ICD-10-CM

## 2023-08-14 DIAGNOSIS — R09.02 HYPOXIA: ICD-10-CM

## 2023-08-14 PROBLEM — E87.1 HYPONATREMIA: Status: ACTIVE | Noted: 2023-08-14

## 2023-08-14 PROBLEM — E11.9 TYPE 2 DIABETES MELLITUS WITHOUT COMPLICATION, WITH LONG-TERM CURRENT USE OF INSULIN (HCC): Status: ACTIVE | Noted: 2023-08-14

## 2023-08-14 PROBLEM — Z79.4 TYPE 2 DIABETES MELLITUS WITHOUT COMPLICATION, WITH LONG-TERM CURRENT USE OF INSULIN (HCC): Status: ACTIVE | Noted: 2023-08-14

## 2023-08-14 PROBLEM — J90 LOCULATED PLEURAL EFFUSION: Status: ACTIVE | Noted: 2023-08-14

## 2023-08-14 PROBLEM — J86.9 EMPYEMA (HCC): Status: ACTIVE | Noted: 2023-08-14

## 2023-08-14 LAB
ADENOVIRUS PCR:: NOT DETECTED
ALBUMIN SERPL-MCNC: 2.2 G/DL (ref 3.4–5)
ALBUMIN/GLOB SERPL: 0.4 {RATIO} (ref 1–2)
ALP LIVER SERPL-CCNC: 131 U/L
ALT SERPL-CCNC: 23 U/L
ANION GAP SERPL CALC-SCNC: 12 MMOL/L (ref 0–18)
AST SERPL-CCNC: 18 U/L (ref 15–37)
ATRIAL RATE: 124 BPM
B PARAPERT DNA SPEC QL NAA+PROBE: NOT DETECTED
B PERT DNA SPEC QL NAA+PROBE: NOT DETECTED
BASOPHILS # BLD AUTO: 0.06 X10(3) UL (ref 0–0.2)
BASOPHILS NFR BLD AUTO: 0.4 %
BILIRUB SERPL-MCNC: 0.8 MG/DL (ref 0.1–2)
BUN BLD-MCNC: 9 MG/DL (ref 7–18)
C PNEUM DNA SPEC QL NAA+PROBE: NOT DETECTED
CALCIUM BLD-MCNC: 9.6 MG/DL (ref 8.5–10.1)
CHLORIDE SERPL-SCNC: 98 MMOL/L (ref 98–112)
CO2 SERPL-SCNC: 21 MMOL/L (ref 21–32)
CORONAVIRUS 229E PCR:: NOT DETECTED
CORONAVIRUS HKU1 PCR:: NOT DETECTED
CORONAVIRUS NL63 PCR:: NOT DETECTED
CORONAVIRUS OC43 PCR:: NOT DETECTED
CREAT BLD-MCNC: 0.63 MG/DL
EGFRCR SERPLBLD CKD-EPI 2021: 112 ML/MIN/1.73M2 (ref 60–?)
EOSINOPHIL # BLD AUTO: 0.1 X10(3) UL (ref 0–0.7)
EOSINOPHIL NFR BLD AUTO: 0.7 %
ERYTHROCYTE [DISTWIDTH] IN BLOOD BY AUTOMATED COUNT: 12.3 %
EST. AVERAGE GLUCOSE BLD GHB EST-MCNC: 289 MG/DL (ref 68–126)
FLUAV RNA SPEC QL NAA+PROBE: NOT DETECTED
FLUBV RNA SPEC QL NAA+PROBE: NOT DETECTED
GLOBULIN PLAS-MCNC: 5.2 G/DL (ref 2.8–4.4)
GLUCOSE BLD-MCNC: 138 MG/DL (ref 70–99)
GLUCOSE BLD-MCNC: 174 MG/DL (ref 70–99)
GLUCOSE BLD-MCNC: 181 MG/DL (ref 70–99)
GLUCOSE BLD-MCNC: 220 MG/DL (ref 70–99)
GLUCOSE BLD-MCNC: 254 MG/DL (ref 70–99)
HBA1C MFR BLD: 11.7 % (ref ?–5.7)
HCT VFR BLD AUTO: 42.8 %
HGB BLD-MCNC: 14 G/DL
IMM GRANULOCYTES # BLD AUTO: 0.1 X10(3) UL (ref 0–1)
IMM GRANULOCYTES NFR BLD: 0.7 %
INR BLD: 1.14 (ref 0.85–1.16)
L PNEUMO AG UR QL: NEGATIVE
LACTATE SERPL-SCNC: 1.4 MMOL/L (ref 0.4–2)
LYMPHOCYTES # BLD AUTO: 1.82 X10(3) UL (ref 1–4)
LYMPHOCYTES NFR BLD AUTO: 12.9 %
MCH RBC QN AUTO: 27.7 PG (ref 26–34)
MCHC RBC AUTO-ENTMCNC: 32.7 G/DL (ref 31–37)
MCV RBC AUTO: 84.6 FL
METAPNEUMOVIRUS PCR:: NOT DETECTED
MONOCYTES # BLD AUTO: 1.37 X10(3) UL (ref 0.1–1)
MONOCYTES NFR BLD AUTO: 9.7 %
MYCOPLASMA PNEUMONIA PCR:: NOT DETECTED
NEUTROPHILS # BLD AUTO: 10.62 X10 (3) UL (ref 1.5–7.7)
NEUTROPHILS # BLD AUTO: 10.62 X10(3) UL (ref 1.5–7.7)
NEUTROPHILS NFR BLD AUTO: 75.6 %
OSMOLALITY SERPL CALC.SUM OF ELEC: 275 MOSM/KG (ref 275–295)
P AXIS: 44 DEGREES
P-R INTERVAL: 136 MS
PARAINFLUENZA 1 PCR:: NOT DETECTED
PARAINFLUENZA 2 PCR:: NOT DETECTED
PARAINFLUENZA 3 PCR:: NOT DETECTED
PARAINFLUENZA 4 PCR:: NOT DETECTED
PLATELET # BLD AUTO: 394 10(3)UL (ref 150–450)
POTASSIUM SERPL-SCNC: 3.5 MMOL/L (ref 3.5–5.1)
PROT SERPL-MCNC: 7.4 G/DL (ref 6.4–8.2)
PROTHROMBIN TIME: 14.6 SECONDS (ref 11.6–14.8)
Q-T INTERVAL: 308 MS
QRS DURATION: 78 MS
QTC CALCULATION (BEZET): 442 MS
R AXIS: 65 DEGREES
RBC # BLD AUTO: 5.06 X10(6)UL
RHINOVIRUS/ENTERO PCR:: NOT DETECTED
RSV RNA SPEC QL NAA+PROBE: NOT DETECTED
SARS-COV-2 RNA NPH QL NAA+NON-PROBE: NOT DETECTED
SARS-COV-2 RNA RESP QL NAA+PROBE: NOT DETECTED
SODIUM SERPL-SCNC: 131 MMOL/L (ref 136–145)
STREP PNEUMO ANTIGEN, URINE: NEGATIVE
T AXIS: 26 DEGREES
VENTRICULAR RATE: 124 BPM
WBC # BLD AUTO: 14.1 X10(3) UL (ref 4–11)

## 2023-08-14 PROCEDURE — 71250 CT THORAX DX C-: CPT | Performed by: EMERGENCY MEDICINE

## 2023-08-14 PROCEDURE — 71275 CT ANGIOGRAPHY CHEST: CPT | Performed by: EMERGENCY MEDICINE

## 2023-08-14 PROCEDURE — 99223 1ST HOSP IP/OBS HIGH 75: CPT | Performed by: STUDENT IN AN ORGANIZED HEALTH CARE EDUCATION/TRAINING PROGRAM

## 2023-08-14 RX ORDER — ENOXAPARIN SODIUM 100 MG/ML
0.5 INJECTION SUBCUTANEOUS NIGHTLY
Status: DISCONTINUED | OUTPATIENT
Start: 2023-08-14 | End: 2023-08-18

## 2023-08-14 RX ORDER — MIDAZOLAM HYDROCHLORIDE 1 MG/ML
INJECTION INTRAMUSCULAR; INTRAVENOUS
Status: DISCONTINUED
Start: 2023-08-14 | End: 2023-08-14 | Stop reason: WASHOUT

## 2023-08-14 RX ORDER — MELATONIN
3 NIGHTLY PRN
Status: DISCONTINUED | OUTPATIENT
Start: 2023-08-14 | End: 2023-09-06

## 2023-08-14 RX ORDER — NICOTINE POLACRILEX 4 MG
15 LOZENGE BUCCAL
Status: DISCONTINUED | OUTPATIENT
Start: 2023-08-14 | End: 2023-09-06

## 2023-08-14 RX ORDER — SODIUM CHLORIDE 9 MG/ML
INJECTION, SOLUTION INTRAVENOUS CONTINUOUS
Status: DISCONTINUED | OUTPATIENT
Start: 2023-08-14 | End: 2023-08-18

## 2023-08-14 RX ORDER — GUAIFENESIN 600 MG/1
600 TABLET, EXTENDED RELEASE ORAL 2 TIMES DAILY PRN
Status: DISCONTINUED | OUTPATIENT
Start: 2023-08-14 | End: 2023-09-06

## 2023-08-14 RX ORDER — AZITHROMYCIN 250 MG/1
500 TABLET, FILM COATED ORAL
Status: COMPLETED | OUTPATIENT
Start: 2023-08-14 | End: 2023-08-16

## 2023-08-14 RX ORDER — POLYETHYLENE GLYCOL 3350 17 G/17G
17 POWDER, FOR SOLUTION ORAL DAILY PRN
Status: DISCONTINUED | OUTPATIENT
Start: 2023-08-14 | End: 2023-09-06

## 2023-08-14 RX ORDER — IBUPROFEN 400 MG/1
400 TABLET ORAL EVERY 6 HOURS PRN
Status: DISCONTINUED | OUTPATIENT
Start: 2023-08-14 | End: 2023-09-06

## 2023-08-14 RX ORDER — PROCHLORPERAZINE EDISYLATE 5 MG/ML
5 INJECTION INTRAMUSCULAR; INTRAVENOUS EVERY 8 HOURS PRN
Status: DISCONTINUED | OUTPATIENT
Start: 2023-08-14 | End: 2023-09-06

## 2023-08-14 RX ORDER — DEXTROSE MONOHYDRATE 25 G/50ML
50 INJECTION, SOLUTION INTRAVENOUS
Status: DISCONTINUED | OUTPATIENT
Start: 2023-08-14 | End: 2023-09-06

## 2023-08-14 RX ORDER — CODEINE PHOSPHATE AND GUAIFENESIN 10; 100 MG/5ML; MG/5ML
5 SOLUTION ORAL EVERY 4 HOURS PRN
Status: DISCONTINUED | OUTPATIENT
Start: 2023-08-14 | End: 2023-08-16

## 2023-08-14 RX ORDER — BENZONATATE 200 MG/1
200 CAPSULE ORAL 3 TIMES DAILY PRN
Status: DISCONTINUED | OUTPATIENT
Start: 2023-08-14 | End: 2023-09-06

## 2023-08-14 RX ORDER — NICOTINE POLACRILEX 4 MG
30 LOZENGE BUCCAL
Status: DISCONTINUED | OUTPATIENT
Start: 2023-08-14 | End: 2023-09-06

## 2023-08-14 RX ORDER — ACETAMINOPHEN 500 MG
500 TABLET ORAL EVERY 4 HOURS PRN
Status: DISCONTINUED | OUTPATIENT
Start: 2023-08-14 | End: 2023-09-06

## 2023-08-14 RX ORDER — NALOXONE HYDROCHLORIDE 0.4 MG/ML
80 INJECTION, SOLUTION INTRAMUSCULAR; INTRAVENOUS; SUBCUTANEOUS AS NEEDED
Status: DISCONTINUED | OUTPATIENT
Start: 2023-08-14 | End: 2023-08-19 | Stop reason: HOSPADM

## 2023-08-14 RX ORDER — BISACODYL 10 MG
10 SUPPOSITORY, RECTAL RECTAL
Status: DISCONTINUED | OUTPATIENT
Start: 2023-08-14 | End: 2023-09-06

## 2023-08-14 RX ORDER — IPRATROPIUM BROMIDE AND ALBUTEROL SULFATE 2.5; .5 MG/3ML; MG/3ML
3 SOLUTION RESPIRATORY (INHALATION) EVERY 4 HOURS PRN
Status: DISCONTINUED | OUTPATIENT
Start: 2023-08-14 | End: 2023-09-06

## 2023-08-14 RX ORDER — ONDANSETRON 2 MG/ML
4 INJECTION INTRAMUSCULAR; INTRAVENOUS EVERY 6 HOURS PRN
Status: DISCONTINUED | OUTPATIENT
Start: 2023-08-14 | End: 2023-09-06

## 2023-08-14 RX ORDER — ACETAMINOPHEN 500 MG
1000 TABLET ORAL ONCE
Status: COMPLETED | OUTPATIENT
Start: 2023-08-14 | End: 2023-08-14

## 2023-08-14 RX ORDER — ENEMA 19; 7 G/133ML; G/133ML
1 ENEMA RECTAL ONCE AS NEEDED
Status: DISCONTINUED | OUTPATIENT
Start: 2023-08-14 | End: 2023-09-06

## 2023-08-14 RX ORDER — DAPAGLIFLOZIN 10 MG/1
10 TABLET, FILM COATED ORAL DAILY
COMMUNITY
Start: 2023-08-11

## 2023-08-14 RX ORDER — SENNOSIDES 8.6 MG
17.2 TABLET ORAL NIGHTLY PRN
Status: DISCONTINUED | OUTPATIENT
Start: 2023-08-14 | End: 2023-09-06

## 2023-08-14 RX ORDER — MIDAZOLAM HYDROCHLORIDE 1 MG/ML
1 INJECTION INTRAMUSCULAR; INTRAVENOUS EVERY 5 MIN PRN
Status: ACTIVE | OUTPATIENT
Start: 2023-08-14 | End: 2023-08-14

## 2023-08-14 RX ORDER — FLUMAZENIL 0.1 MG/ML
0.2 INJECTION INTRAVENOUS AS NEEDED
Status: DISCONTINUED | OUTPATIENT
Start: 2023-08-14 | End: 2023-08-19 | Stop reason: HOSPADM

## 2023-08-14 NOTE — PROGRESS NOTES
NURSING ADMISSION NOTE      Patient admitted via Cart  Oriented to room. Safety precautions initiated. Bed in low position. Call light in reach. Pt admitted from ER to room 520 with necrotizing pna. AxO x4. 3L NC, baseline room air, crackles bilaterally, diminished. Strong, uncontrolled cough with pleuritic pain to left chest/rib cage, antitussives ordered. IV abx. ST on tele. Lovenox. Plan for US guided thoracentesis tomorrow, NPO midnight. Currently on diabetic diet with good appetite. Voids. Denies GI symptoms. Up self. POC reviewed, call light within reach.

## 2023-08-14 NOTE — IMAGING NOTE
Spoke with floor RN-Clarisa, advised to keep pt NPO after midnight except meds with a small sip, hold Lovenox subcutaneous after 9pm tonight, draw STAT PT/INR in the am, send pt down saline locked and accucheck prior to leaving the floor tomorrow. Radiology Dept will obtain consent once pt arrives to Ultrasound. RN verbalized understanding and we will call back with the pt's procedure time for tomorrow.

## 2023-08-14 NOTE — IMAGING NOTE
Pt here for CT Thoracentesis with chest tube placement. Pre procedure vitals checked. IV access to right antecubital area saline lock. 0.9 NS IV initiated to maintain patency. Informed consent obtained by Dr Meredith Pardo. Positioned pt prone on scanner.  image was done and reviewed by Dr Meredith Pardo. States pt does not have a lot of fluid to warrant chest tube. He will talk to Dr Paulino Rai to update him. Procedure was canceled. SBAR report given to [facility] ER RN. Transported pt back to ER A0.

## 2023-08-14 NOTE — ED INITIAL ASSESSMENT (HPI)
Pt here due to cough, CP and fever. Pt was here this past week for the same thing. Pt was Dx with Pneumonia sent home on medications. Pt states that it is not getting any better and the fever will not go away.

## 2023-08-14 NOTE — ED QUICK NOTES
Pt ambulated to the restroom.  Pt's SPO2 dropped to 87-88% while off of Oxygen, Pt placed back on O2 when back in the room

## 2023-08-14 NOTE — ED QUICK NOTES
Orders for admission, patient is aware of plan and ready to go upstairs. Any questions, please call ED RN Tavon Hunter at extension [phone].      Patient Covid vaccination status: Fully vaccinated     COVID Test Ordered in ED: Rapid SARS-CoV-2 by PCR    COVID Suspicion at Admission: N/A    Running Infusions:  None    Mental Status/LOC at time of transport: Alert    Other pertinent information:   CIWA score: N/A   NIH score:  N/A

## 2023-08-15 ENCOUNTER — APPOINTMENT (OUTPATIENT)
Dept: ULTRASOUND IMAGING | Facility: HOSPITAL | Age: 53
End: 2023-08-15
Attending: INTERNAL MEDICINE
Payer: COMMERCIAL

## 2023-08-15 LAB
ALBUMIN SERPL-MCNC: 2 G/DL (ref 3.4–5)
ALBUMIN/GLOB SERPL: 0.4 {RATIO} (ref 1–2)
ALP LIVER SERPL-CCNC: 138 U/L
ALT SERPL-CCNC: 22 U/L
ANION GAP SERPL CALC-SCNC: 12 MMOL/L (ref 0–18)
AST SERPL-CCNC: 16 U/L (ref 15–37)
BASOPHILS # BLD AUTO: 0.06 X10(3) UL (ref 0–0.2)
BASOPHILS NFR BLD AUTO: 0.4 %
BILIRUB SERPL-MCNC: 0.7 MG/DL (ref 0.1–2)
BUN BLD-MCNC: 14 MG/DL (ref 7–18)
CALCIUM BLD-MCNC: 9.1 MG/DL (ref 8.5–10.1)
CHLORIDE SERPL-SCNC: 99 MMOL/L (ref 98–112)
CO2 SERPL-SCNC: 22 MMOL/L (ref 21–32)
CREAT BLD-MCNC: 0.77 MG/DL
EGFRCR SERPLBLD CKD-EPI 2021: 106 ML/MIN/1.73M2 (ref 60–?)
EOSINOPHIL # BLD AUTO: 0.12 X10(3) UL (ref 0–0.7)
EOSINOPHIL NFR BLD AUTO: 0.8 %
ERYTHROCYTE [DISTWIDTH] IN BLOOD BY AUTOMATED COUNT: 12.4 %
GLOBULIN PLAS-MCNC: 5.3 G/DL (ref 2.8–4.4)
GLUCOSE BLD-MCNC: 186 MG/DL (ref 70–99)
GLUCOSE BLD-MCNC: 202 MG/DL (ref 70–99)
GLUCOSE BLD-MCNC: 213 MG/DL (ref 70–99)
GLUCOSE BLD-MCNC: 231 MG/DL (ref 70–99)
GLUCOSE BLD-MCNC: 245 MG/DL (ref 70–99)
HCT VFR BLD AUTO: 42.9 %
HGB BLD-MCNC: 13.6 G/DL
IMM GRANULOCYTES # BLD AUTO: 0.11 X10(3) UL (ref 0–1)
IMM GRANULOCYTES NFR BLD: 0.8 %
INR BLD: 1.17 (ref 0.85–1.16)
LYMPHOCYTES # BLD AUTO: 1.38 X10(3) UL (ref 1–4)
LYMPHOCYTES NFR BLD AUTO: 9.6 %
MCH RBC QN AUTO: 27.5 PG (ref 26–34)
MCHC RBC AUTO-ENTMCNC: 31.7 G/DL (ref 31–37)
MCV RBC AUTO: 86.8 FL
MONOCYTES # BLD AUTO: 1.37 X10(3) UL (ref 0.1–1)
MONOCYTES NFR BLD AUTO: 9.6 %
NEUTROPHILS # BLD AUTO: 11.28 X10 (3) UL (ref 1.5–7.7)
NEUTROPHILS # BLD AUTO: 11.28 X10(3) UL (ref 1.5–7.7)
NEUTROPHILS NFR BLD AUTO: 78.8 %
OSMOLALITY SERPL CALC.SUM OF ELEC: 285 MOSM/KG (ref 275–295)
PLATELET # BLD AUTO: 405 10(3)UL (ref 150–450)
POTASSIUM SERPL-SCNC: 3.5 MMOL/L (ref 3.5–5.1)
PROCALCITONIN SERPL-MCNC: 0.94 NG/ML (ref ?–0.16)
PROT SERPL-MCNC: 7.3 G/DL (ref 6.4–8.2)
PROTHROMBIN TIME: 14.9 SECONDS (ref 11.6–14.8)
RBC # BLD AUTO: 4.94 X10(6)UL
SODIUM SERPL-SCNC: 133 MMOL/L (ref 136–145)
WBC # BLD AUTO: 14.3 X10(3) UL (ref 4–11)

## 2023-08-15 PROCEDURE — 76604 US EXAM CHEST: CPT | Performed by: INTERNAL MEDICINE

## 2023-08-15 PROCEDURE — 99232 SBSQ HOSP IP/OBS MODERATE 35: CPT | Performed by: STUDENT IN AN ORGANIZED HEALTH CARE EDUCATION/TRAINING PROGRAM

## 2023-08-15 RX ORDER — SODIUM CHLORIDE 9 MG/ML
INJECTION, SOLUTION INTRAVENOUS CONTINUOUS
Status: DISCONTINUED | OUTPATIENT
Start: 2023-08-15 | End: 2023-08-20

## 2023-08-15 NOTE — PROGRESS NOTES
Problem: Necrotizing PNA    Data: Pt is A&Ox4.  on 2L. Room air is baseline. Need sputum sample. On tele. NSR/ST. E.p Non-cardiac. Held lovenox for US guided thoracentesis tomorrow NPO at midnight pt aware. Voids Via urinal and bathroom. Up self. PRN cough and pain medication given for pleuritic pain. Intervention: IV abx, PT/INR lab put in for am prior to procedure. consent will be obtained down in IR. Edu: PT updated on POC. Understands that he will be NPO at midnight which includes no drinking or eating. All questions answered at this time call light within reach.

## 2023-08-15 NOTE — PLAN OF CARE
Problem: Patient/Family Goals  Goal: Patient/Family Long Term Goal  Description: Patient's Long Term Goal: to be dsicharged    Interventions:  - follow MD orders  - See additional Care Plan goals for specific interventions  Outcome: Progressing  Goal: Patient/Family Short Term Goal  Description: Patient's Short Term Goal:     08/14NOC: NPO @oooo for thoracentesis tomorrow, pain management    Interventions:   - PRN pain meds  - See additional Care Plan goals for specific interventions  Outcome: Progressing     Problem: RESPIRATORY - ADULT  Goal: Achieves optimal ventilation and oxygenation  Description: INTERVENTIONS:  - Assess for changes in respiratory status  - Assess for changes in mentation and behavior  - Position to facilitate oxygenation and minimize respiratory effort  - Oxygen supplementation based on oxygen saturation or ABGs  - Provide Smoking Cessation handout, if applicable  - Encourage broncho-pulmonary hygiene including cough, deep breathe, Incentive Spirometry  - Assess the need for suctioning and perform as needed  - Assess and instruct to report SOB or any respiratory difficulty  - Respiratory Therapy support as indicated  - Manage/alleviate anxiety  - Monitor for signs/symptoms of CO2 retention  Outcome: Progressing     Problem: METABOLIC/FLUID AND ELECTROLYTES - ADULT  Goal: Glucose maintained within prescribed range  Description: INTERVENTIONS:  - Monitor Blood Glucose as ordered  - Assess for signs and symptoms of hyperglycemia and hypoglycemia  - Administer ordered medications to maintain glucose within target range  - Assess barriers to adequate nutritional intake and initiate nutrition consult as needed  - Instruct patient on self management of diabetes  Outcome: Progressing  Goal: Electrolytes maintained within normal limits  Description: INTERVENTIONS:  - Monitor labs and rhythm and assess patient for signs and symptoms of electrolyte imbalances  - Administer electrolyte replacement as ordered  - Monitor response to electrolyte replacements, including rhythm and repeat lab results as appropriate  - Fluid restriction as ordered  - Instruct patient on fluid and nutrition restrictions as appropriate  Outcome: Progressing  Goal: Hemodynamic stability and optimal renal function maintained  Description: INTERVENTIONS:  - Monitor labs and assess for signs and symptoms of volume excess or deficit  - Monitor intake, output and patient weight  - Monitor urine specific gravity, serum osmolarity and serum sodium as indicated or ordered  - Monitor response to interventions for patient's volume status, including labs, urine output, blood pressure (other measures as available)  - Encourage oral intake as appropriate  - Instruct patient on fluid and nutrition restrictions as appropriate  Outcome: Progressing

## 2023-08-16 LAB
GLUCOSE BLD-MCNC: 224 MG/DL (ref 70–99)
GLUCOSE BLD-MCNC: 251 MG/DL (ref 70–99)
GLUCOSE BLD-MCNC: 252 MG/DL (ref 70–99)
GLUCOSE BLD-MCNC: 263 MG/DL (ref 70–99)

## 2023-08-16 PROCEDURE — 99232 SBSQ HOSP IP/OBS MODERATE 35: CPT | Performed by: HOSPITALIST

## 2023-08-16 RX ORDER — CODEINE PHOSPHATE AND GUAIFENESIN 10; 100 MG/5ML; MG/5ML
5 SOLUTION ORAL EVERY 4 HOURS PRN
Status: DISCONTINUED | OUTPATIENT
Start: 2023-08-16 | End: 2023-08-17

## 2023-08-16 NOTE — PROGRESS NOTES
AOXZ4, VSS, Tmax 100.7. PRN given. 2L NC while awake, 3L-4L at [facility]. Productive cough. Tele ST. Lovenox. IVF, IV ABX. SB assist QID accuchecks. Updated pt on POC for NOC. Will follow.

## 2023-08-16 NOTE — PROGRESS NOTES
Problem:  Necrotizing Pneumonia     Data:  Alert and oriented. 2LNC. Room air is baseline. 4-5LNC at [facility]. Tele, ST. Monitor temps. Up independent. Accuchecks QID. IV abx. IVF. Patient showered today. PRN cough medication. CXR ordered. Action: Keep patient comfortable and continue to monitor every 2 hours. Education: POC for the day. Response: Verbalized understanding.

## 2023-08-17 ENCOUNTER — APPOINTMENT (OUTPATIENT)
Dept: GENERAL RADIOLOGY | Facility: HOSPITAL | Age: 53
End: 2023-08-17
Attending: INTERNAL MEDICINE
Payer: COMMERCIAL

## 2023-08-17 ENCOUNTER — APPOINTMENT (OUTPATIENT)
Dept: CV DIAGNOSTICS | Facility: HOSPITAL | Age: 53
End: 2023-08-17
Attending: NURSE PRACTITIONER
Payer: COMMERCIAL

## 2023-08-17 PROBLEM — I48.0 PAROXYSMAL ATRIAL FIBRILLATION (HCC): Status: ACTIVE | Noted: 2023-08-17

## 2023-08-17 LAB
ATRIAL RATE: 100 BPM
GLUCOSE BLD-MCNC: 195 MG/DL (ref 70–99)
GLUCOSE BLD-MCNC: 221 MG/DL (ref 70–99)
GLUCOSE BLD-MCNC: 233 MG/DL (ref 70–99)
GLUCOSE BLD-MCNC: 233 MG/DL (ref 70–99)
GLUCOSE BLD-MCNC: 260 MG/DL (ref 70–99)
LACTATE SERPL-SCNC: 1.7 MMOL/L (ref 0.4–2)
LACTATE SERPL-SCNC: 2.1 MMOL/L (ref 0.4–2)
Q-T INTERVAL: 396 MS
QRS DURATION: 84 MS
QTC CALCULATION (BEZET): 516 MS
R AXIS: 21 DEGREES
T AXIS: -8 DEGREES
VENTRICULAR RATE: 102 BPM

## 2023-08-17 PROCEDURE — 71047 X-RAY EXAM CHEST 3 VIEWS: CPT | Performed by: INTERNAL MEDICINE

## 2023-08-17 PROCEDURE — 93306 TTE W/DOPPLER COMPLETE: CPT | Performed by: NURSE PRACTITIONER

## 2023-08-17 PROCEDURE — 99233 SBSQ HOSP IP/OBS HIGH 50: CPT | Performed by: HOSPITALIST

## 2023-08-17 RX ORDER — HYDROCODONE BITARTRATE AND HOMATROPINE METHYLBROMIDE ORAL SOLUTION 5; 1.5 MG/5ML; MG/5ML
5 LIQUID ORAL EVERY 4 HOURS PRN
Status: DISCONTINUED | OUTPATIENT
Start: 2023-08-17 | End: 2023-09-06

## 2023-08-17 NOTE — PROGRESS NOTES
Problem: Necrotizing PNA     Data: Pt is A&Ox4.  on 2L. Room air is baseline. On tele. NSR/ST. E.p Non-cardiac. Voids Via urinal and bathroom. Up self. PRN cough and pain medication given for pleuritic pain. Pt requesting stronger cough medication see new orders. Intervention: IV abx, PRN tylenol and ibuprofen, PRN cough meds        Edu: PT updated on POC. All questions answered at this time call light within reach.

## 2023-08-17 NOTE — DISCHARGE INSTRUCTIONS
Sometimes managing your health at home requires assistance. The [facility] team has recognized your preference to use [facility]. They can be reached by phone at [phone]. The fax number for your reference is [phone]. A representative from the home health agency will contact you or your family to schedule your first visit. Follow up for Diabetes care: It is recommended that you follow up with an outpatient diabetes center. Please obtain a referral from your primary care doctor if you do not already have one. Below is the location and number of the [facility]  nearest you. [place] location:  [address] [place] [address]  [phone]    [place] location:  [address] [place] [address]  [phone]    [place] location:  [address] [place] [address].   [phone]    [place] location:  [address]  [phone]    [place] location:  [address]   [phone]    [place] location:  [facility]  [phone]

## 2023-08-17 NOTE — PROGRESS NOTES
08/17/23 0023   Provider Notification   Reason for Communication Other (comment)   Provider Name Other (comment)  (Dr. Dea Addison)   Method of Communication Page         Pt in 520 c/o cough stated the robitussin with codein, tessalon did not help and requesting a lozenge, Did you want to do hydromet?       See new order put in by MD

## 2023-08-17 NOTE — PLAN OF CARE
8/17 AM: Pt is A/O x 4, restless this AM. Lung sounds are diminished, on 1L this AM - raised to 8L later in shift. BP controlled, HR 90 - 130s sinus tachy. Patient was on Cardizem drip, then discontinued by cardiologist. Patient spiked a temp 101.9, Tylenol given. 3,945 NS bolus infusing per MD. QID accucheck.        15:30 - 1st bolus done  16:30 - 2nd bolus done    Problem: Patient/Family Goals  Goal: Patient/Family Long Term Goal  Description: Patient's Long Term Goal: to be dsicharged    Interventions:  - follow MD orders  - See additional Care Plan goals for specific interventions  Outcome: Progressing  Goal: Patient/Family Short Term Goal  Description: Patient's Short Term Goal:     08/14NOC: NPO @oooo for thoracentesis tomorrow, pain management  08/17NOC: get sleep and cough less  8/17 AM: Sepsis protocol     Interventions:   - PRN pain meds  -PRN cough meds  - See additional Care Plan goals for specific interventions  Outcome: Progressing

## 2023-08-17 NOTE — PLAN OF CARE
Problem: Patient/Family Goals  Goal: Patient/Family Long Term Goal  Description: Patient's Long Term Goal: to be dsicharged    Interventions:  - follow MD orders  - See additional Care Plan goals for specific interventions  Outcome: Progressing  Goal: Patient/Family Short Term Goal  Description: Patient's Short Term Goal:     08/14NOC: NPO @oooo for thoracentesis tomorrow, pain management      08/17NOC: get sleep and cough less    Interventions:   - PRN pain meds  -PRN cough meds  - See additional Care Plan goals for specific interventions  Outcome: Progressing     Problem: RESPIRATORY - ADULT  Goal: Achieves optimal ventilation and oxygenation  Description: INTERVENTIONS:  - Assess for changes in respiratory status  - Assess for changes in mentation and behavior  - Position to facilitate oxygenation and minimize respiratory effort  - Oxygen supplementation based on oxygen saturation or ABGs  - Provide Smoking Cessation handout, if applicable  - Encourage broncho-pulmonary hygiene including cough, deep breathe, Incentive Spirometry  - Assess the need for suctioning and perform as needed  - Assess and instruct to report SOB or any respiratory difficulty  - Respiratory Therapy support as indicated  - Manage/alleviate anxiety  - Monitor for signs/symptoms of CO2 retention  Outcome: Progressing     Problem: METABOLIC/FLUID AND ELECTROLYTES - ADULT  Goal: Glucose maintained within prescribed range  Description: INTERVENTIONS:  - Monitor Blood Glucose as ordered  - Assess for signs and symptoms of hyperglycemia and hypoglycemia  - Administer ordered medications to maintain glucose within target range  - Assess barriers to adequate nutritional intake and initiate nutrition consult as needed  - Instruct patient on self management of diabetes  Outcome: Progressing  Goal: Electrolytes maintained within normal limits  Description: INTERVENTIONS:  - Monitor labs and rhythm and assess patient for signs and symptoms of electrolyte imbalances  - Administer electrolyte replacement as ordered  - Monitor response to electrolyte replacements, including rhythm and repeat lab results as appropriate  - Fluid restriction as ordered  - Instruct patient on fluid and nutrition restrictions as appropriate  Outcome: Progressing  Goal: Hemodynamic stability and optimal renal function maintained  Description: INTERVENTIONS:  - Monitor labs and assess for signs and symptoms of volume excess or deficit  - Monitor intake, output and patient weight  - Monitor urine specific gravity, serum osmolarity and serum sodium as indicated or ordered  - Monitor response to interventions for patient's volume status, including labs, urine output, blood pressure (other measures as available)  - Encourage oral intake as appropriate  - Instruct patient on fluid and nutrition restrictions as appropriate  Outcome: Progressing

## 2023-08-17 NOTE — PROGRESS NOTES
08/17/23 0309   Provider Notification   Reason for Communication Other (comment); Change in status   Provider Name Other (comment)  (Dr. Smiley Kapoor)   Method of Communication Page   Response Waiting for response       Pt in 520 went into a-fib this is a new onset for him. I took 3 EKGS because one showed just a-fib another showed a-fib with RVR. The last one showed  a-fib with RVR. Pt has been having fevers all night not sure if this is what caused him to go into a-fib. I did already give him tylenol and ibuprofen, bp 110/58 AND TEMP 99.9, any new orders? See new orders placed by Dr. Smiley Kapoor.

## 2023-08-18 ENCOUNTER — APPOINTMENT (OUTPATIENT)
Dept: CT IMAGING | Facility: HOSPITAL | Age: 53
End: 2023-08-18
Attending: INTERNAL MEDICINE
Payer: COMMERCIAL

## 2023-08-18 PROBLEM — J96.01 ACUTE RESPIRATORY FAILURE WITH HYPOXIA (HCC): Status: ACTIVE | Noted: 2023-08-18

## 2023-08-18 LAB
ADENOVIRUS PCR:: NOT DETECTED
B PARAPERT DNA SPEC QL NAA+PROBE: NOT DETECTED
B PERT DNA SPEC QL NAA+PROBE: NOT DETECTED
C PNEUM DNA SPEC QL NAA+PROBE: NOT DETECTED
CORONAVIRUS 229E PCR:: NOT DETECTED
CORONAVIRUS HKU1 PCR:: NOT DETECTED
CORONAVIRUS NL63 PCR:: NOT DETECTED
CORONAVIRUS OC43 PCR:: NOT DETECTED
FLUAV RNA SPEC QL NAA+PROBE: NOT DETECTED
FLUBV RNA SPEC QL NAA+PROBE: NOT DETECTED
GLUCOSE BLD-MCNC: 157 MG/DL (ref 70–99)
GLUCOSE BLD-MCNC: 207 MG/DL (ref 70–99)
GLUCOSE BLD-MCNC: 249 MG/DL (ref 70–99)
GLUCOSE BLD-MCNC: 258 MG/DL (ref 70–99)
METAPNEUMOVIRUS PCR:: NOT DETECTED
MYCOPLASMA PNEUMONIA PCR:: NOT DETECTED
PARAINFLUENZA 1 PCR:: NOT DETECTED
PARAINFLUENZA 2 PCR:: NOT DETECTED
PARAINFLUENZA 3 PCR:: NOT DETECTED
PARAINFLUENZA 4 PCR:: NOT DETECTED
RHINOVIRUS/ENTERO PCR:: NOT DETECTED
RSV RNA SPEC QL NAA+PROBE: NOT DETECTED
SARS-COV-2 RNA NPH QL NAA+NON-PROBE: NOT DETECTED

## 2023-08-18 PROCEDURE — 71250 CT THORAX DX C-: CPT | Performed by: INTERNAL MEDICINE

## 2023-08-18 PROCEDURE — 99233 SBSQ HOSP IP/OBS HIGH 50: CPT | Performed by: HOSPITALIST

## 2023-08-18 RX ORDER — DILTIAZEM HYDROCHLORIDE 120 MG/1
120 CAPSULE, EXTENDED RELEASE ORAL DAILY
Status: DISCONTINUED | OUTPATIENT
Start: 2023-08-18 | End: 2023-09-06

## 2023-08-18 NOTE — PLAN OF CARE
Pt is admitted for PNA. Pt is AOX4. Fatigued. VSS, afebrile and denies any pain. SpO2 maintained on 4L and needs 8 L with ambulation. . Denies any SOB. Congested productive cough. Coughs large amount of dark tan color thick sputum, sputum sent  to lab. Gave Tessalon . Encouraged to do IS-1250. Tele-ST. PO Cardizem. PO Eliquis. Carb control diet/QID ACCU check. Denies any n/v/d. Voids in urinal. Up with SBA. IV ABX. CTA ordered. ID consulted. WCTM. Pt is updated with plan of care.         Problem: Patient/Family Goals  Goal: Patient/Family Long Term Goal  Description: Patient's Long Term Goal: to be dsicharged    Interventions:  - follow MD orders  - See additional Care Plan goals for specific interventions  Outcome: Progressing  Goal: Patient/Family Short Term Goal  Description: Patient's Short Term Goal:     08/14NOC: NPO @oooo for thoracentesis tomorrow, pain management  08/17NOC: get sleep and cough less  8/17 AM: Sepsis protocol   8/18: feel better , control cough     Interventions:   - PRN pain meds  -PRN cough meds  - See additional Care Plan goals for specific interventions  Outcome: Progressing     Problem: RESPIRATORY - ADULT  Goal: Achieves optimal ventilation and oxygenation  Description: INTERVENTIONS:  - Assess for changes in respiratory status  - Assess for changes in mentation and behavior  - Position to facilitate oxygenation and minimize respiratory effort  - Oxygen supplementation based on oxygen saturation or ABGs  - Provide Smoking Cessation handout, if applicable  - Encourage broncho-pulmonary hygiene including cough, deep breathe, Incentive Spirometry  - Assess the need for suctioning and perform as needed  - Assess and instruct to report SOB or any respiratory difficulty  - Respiratory Therapy support as indicated  - Manage/alleviate anxiety  - Monitor for signs/symptoms of CO2 retention  Outcome: Progressing     Problem: METABOLIC/FLUID AND ELECTROLYTES - ADULT  Goal: Glucose maintained within prescribed range  Description: INTERVENTIONS:  - Monitor Blood Glucose as ordered  - Assess for signs and symptoms of hyperglycemia and hypoglycemia  - Administer ordered medications to maintain glucose within target range  - Assess barriers to adequate nutritional intake and initiate nutrition consult as needed  - Instruct patient on self management of diabetes  Outcome: Progressing  Goal: Electrolytes maintained within normal limits  Description: INTERVENTIONS:  - Monitor labs and rhythm and assess patient for signs and symptoms of electrolyte imbalances  - Administer electrolyte replacement as ordered  - Monitor response to electrolyte replacements, including rhythm and repeat lab results as appropriate  - Fluid restriction as ordered  - Instruct patient on fluid and nutrition restrictions as appropriate  Outcome: Progressing  Goal: Hemodynamic stability and optimal renal function maintained  Description: INTERVENTIONS:  - Monitor labs and assess for signs and symptoms of volume excess or deficit  - Monitor intake, output and patient weight  - Monitor urine specific gravity, serum osmolarity and serum sodium as indicated or ordered  - Monitor response to interventions for patient's volume status, including labs, urine output, blood pressure (other measures as available)  - Encourage oral intake as appropriate  - Instruct patient on fluid and nutrition restrictions as appropriate  Outcome: Progressing

## 2023-08-18 NOTE — PROGRESS NOTES
Problem: Necrotizing PNA     Data: Pt is A&Ox4.  on 4L. Room air is baseline. On tele. NSR/ST. E.p Non-cardiac. Voids Via urinal and bathroom. Up self. PRN cough and pain medication given for pleuritic pain. Intervention: IV abx, PRN tylenol and ibuprofen, PRN cough meds        Edu: PT updated on POC. All questions answered at this time call light within reach.

## 2023-08-18 NOTE — PROGRESS NOTES
08/17/23 2230   Provider Notification   Reason for Communication Other (comment)   Provider Name Other (comment)  (Dr. Chelo Rosa)   Method of Communication Page     Pt in 520 was admitted on 08/14 with necrotizing PNA and a small pleural effusion. Pt seems a bit more out of it than yesterday and I had to go in there twice because his nasal cannula was in his eyes . He desated to 83%. It also sounds like he has crackles bilaterally. Not sure if you wanted any new orders? Thanks. Also not sure if you guys were made aware but at one point today his O2 needs increased 2 to 8L now on 4L but CXR showed worsening PNA.       Per Dr. Chelo Rosa no new orders

## 2023-08-19 LAB
BASOPHILS # BLD AUTO: 0.1 X10(3) UL (ref 0–0.2)
BASOPHILS NFR BLD AUTO: 0.8 %
EOSINOPHIL # BLD AUTO: 0.04 X10(3) UL (ref 0–0.7)
EOSINOPHIL NFR BLD AUTO: 0.3 %
ERYTHROCYTE [DISTWIDTH] IN BLOOD BY AUTOMATED COUNT: 12.9 %
GLUCOSE BLD-MCNC: 188 MG/DL (ref 70–99)
GLUCOSE BLD-MCNC: 233 MG/DL (ref 70–99)
GLUCOSE BLD-MCNC: 254 MG/DL (ref 70–99)
GLUCOSE BLD-MCNC: 274 MG/DL (ref 70–99)
HCT VFR BLD AUTO: 37.1 %
HGB BLD-MCNC: 12.1 G/DL
IMM GRANULOCYTES # BLD AUTO: 0.16 X10(3) UL (ref 0–1)
IMM GRANULOCYTES NFR BLD: 1.3 %
LYMPHOCYTES # BLD AUTO: 1.22 X10(3) UL (ref 1–4)
LYMPHOCYTES NFR BLD AUTO: 9.6 %
MCH RBC QN AUTO: 27.9 PG (ref 26–34)
MCHC RBC AUTO-ENTMCNC: 32.6 G/DL (ref 31–37)
MCV RBC AUTO: 85.5 FL
MONOCYTES # BLD AUTO: 1.46 X10(3) UL (ref 0.1–1)
MONOCYTES NFR BLD AUTO: 11.5 %
NEUTROPHILS # BLD AUTO: 9.76 X10 (3) UL (ref 1.5–7.7)
NEUTROPHILS # BLD AUTO: 9.76 X10(3) UL (ref 1.5–7.7)
NEUTROPHILS NFR BLD AUTO: 76.5 %
PLATELET # BLD AUTO: 441 10(3)UL (ref 150–450)
RBC # BLD AUTO: 4.34 X10(6)UL
WBC # BLD AUTO: 12.7 X10(3) UL (ref 4–11)

## 2023-08-19 PROCEDURE — 5A0945A ASSISTANCE WITH RESPIRATORY VENTILATION, 24-96 CONSECUTIVE HOURS, HIGH NASAL FLOW/VELOCITY: ICD-10-PCS | Performed by: INTERNAL MEDICINE

## 2023-08-19 PROCEDURE — 99233 SBSQ HOSP IP/OBS HIGH 50: CPT | Performed by: HOSPITALIST

## 2023-08-19 NOTE — TREATMENT PLAN
Cardiology will sign off. Please call us if needed. Restart Eliquis after CT or thoracentesis. Patient is to Follow up with Dr. Hardik HOLM in 2-4 weeks. [phone].    Abram JOYNER

## 2023-08-19 NOTE — PROGRESS NOTES
Received pt at 0700, a&ox4. 5L at rest, 10L with exertion. O2 walk completed this shift. RA is baseline. Productive cough, agreeable to Cepacol this morning. ST on tele. Afebrile this shift. Continent x2. No c/o pain. Up SB BRP. Bcx (-) x 5 days. IV zosyn. Tolerating carb controlled diet with QID accuchecks. 0.9@ 100ml/hr. 2PIVs, extended does not draw back. Plan for Chest Tube 8/20 per pulm. Hold xarelto, NPO @ 2400. ID ordered Quantiferon TB, no need for isolation per ID. Updated on POC, no further questions at this time.      Problem: Patient/Family Goals  Goal: Patient/Family Long Term Goal  Description: Patient's Long Term Goal: to be dsicharged    Interventions:  - follow MD orders  - See additional Care Plan goals for specific interventions  Outcome: Progressing  Goal: Patient/Family Short Term Goal  Description: Patient's Short Term Goal:   08/14NOC: NPO @oooo for thoracentesis tomorrow, pain management  08/17NOC: get sleep and cough less  8/17 AM: Sepsis protocol   8/18: feel better , control cough   8/19 noc; reduce fever  8/19am: tolerate O2 walk     Interventions:   - PRN pain meds  -PRN cough meds  - See additional Care Plan goals for specific interventions  Outcome: Progressing     Problem: RESPIRATORY - ADULT  Goal: Achieves optimal ventilation and oxygenation  Description: INTERVENTIONS:  - Assess for changes in respiratory status  - Assess for changes in mentation and behavior  - Position to facilitate oxygenation and minimize respiratory effort  - Oxygen supplementation based on oxygen saturation or ABGs  - Provide Smoking Cessation handout, if applicable  - Encourage broncho-pulmonary hygiene including cough, deep breathe, Incentive Spirometry  - Assess the need for suctioning and perform as needed  - Assess and instruct to report SOB or any respiratory difficulty  - Respiratory Therapy support as indicated  - Manage/alleviate anxiety  - Monitor for signs/symptoms of CO2 retention  Outcome: Progressing     Problem: METABOLIC/FLUID AND ELECTROLYTES - ADULT  Goal: Glucose maintained within prescribed range  Description: INTERVENTIONS:  - Monitor Blood Glucose as ordered  - Assess for signs and symptoms of hyperglycemia and hypoglycemia  - Administer ordered medications to maintain glucose within target range  - Assess barriers to adequate nutritional intake and initiate nutrition consult as needed  - Instruct patient on self management of diabetes  Outcome: Progressing  Goal: Electrolytes maintained within normal limits  Description: INTERVENTIONS:  - Monitor labs and rhythm and assess patient for signs and symptoms of electrolyte imbalances  - Administer electrolyte replacement as ordered  - Monitor response to electrolyte replacements, including rhythm and repeat lab results as appropriate  - Fluid restriction as ordered  - Instruct patient on fluid and nutrition restrictions as appropriate  Outcome: Progressing  Goal: Hemodynamic stability and optimal renal function maintained  Description: INTERVENTIONS:  - Monitor labs and assess for signs and symptoms of volume excess or deficit  - Monitor intake, output and patient weight  - Monitor urine specific gravity, serum osmolarity and serum sodium as indicated or ordered  - Monitor response to interventions for patient's volume status, including labs, urine output, blood pressure (other measures as available)  - Encourage oral intake as appropriate  - Instruct patient on fluid and nutrition restrictions as appropriate  Outcome: Progressing

## 2023-08-19 NOTE — PLAN OF CARE
Problem: PNA  Data: Ax04. Fatigue. Telemetry- sinus tachy. , on 5L high flow overnight. Strong productive cough. Thick yellow phlegm. Febrile 101.2 tmax. Continent. 1800 ADA diet. Accu checks. Up with sba assist. Requires up to 8L HF with mobility. IVF 0.9 Nacl @ 125ml/hr. Cough medication prn given. Melatonin given for sleep. MRSA swab sent. Histoplasma urine sent. Intervention: IV zosyn, Insulin, acetaminophen, hydromet. Melatonin,   Education: Medications, call light   Response: Cooperative    @2109 Sepsis advisory notice- MD notified.  NNO    Problem: Patient/Family Goals  Goal: Patient/Family Long Term Goal  Description: Patient's Long Term Goal: to be dsicharged    Interventions:  - follow MD orders  - See additional Care Plan goals for specific interventions  Outcome: Progressing  Goal: Patient/Family Short Term Goal  Description: Patient's Short Term Goal:     08/14NOC: NPO @oooo for thoracentesis tomorrow, pain management  08/17NOC: get sleep and cough less  8/17 AM: Sepsis protocol   8/18: feel better , control cough   8/19 noc; reduce fever    Interventions:   - PRN pain meds  -PRN cough meds  - See additional Care Plan goals for specific interventions  Outcome: Progressing     Problem: RESPIRATORY - ADULT  Goal: Achieves optimal ventilation and oxygenation  Description: INTERVENTIONS:  - Assess for changes in respiratory status  - Assess for changes in mentation and behavior  - Position to facilitate oxygenation and minimize respiratory effort  - Oxygen supplementation based on oxygen saturation or ABGs  - Provide Smoking Cessation handout, if applicable  - Encourage broncho-pulmonary hygiene including cough, deep breathe, Incentive Spirometry  - Assess the need for suctioning and perform as needed  - Assess and instruct to report SOB or any respiratory difficulty  - Respiratory Therapy support as indicated  - Manage/alleviate anxiety  - Monitor for signs/symptoms of CO2 retention  Outcome: Progressing     Problem: METABOLIC/FLUID AND ELECTROLYTES - ADULT  Goal: Glucose maintained within prescribed range  Description: INTERVENTIONS:  - Monitor Blood Glucose as ordered  - Assess for signs and symptoms of hyperglycemia and hypoglycemia  - Administer ordered medications to maintain glucose within target range  - Assess barriers to adequate nutritional intake and initiate nutrition consult as needed  - Instruct patient on self management of diabetes  Outcome: Progressing  Goal: Electrolytes maintained within normal limits  Description: INTERVENTIONS:  - Monitor labs and rhythm and assess patient for signs and symptoms of electrolyte imbalances  - Administer electrolyte replacement as ordered  - Monitor response to electrolyte replacements, including rhythm and repeat lab results as appropriate  - Fluid restriction as ordered  - Instruct patient on fluid and nutrition restrictions as appropriate  Outcome: Progressing  Goal: Hemodynamic stability and optimal renal function maintained  Description: INTERVENTIONS:  - Monitor labs and assess for signs and symptoms of volume excess or deficit  - Monitor intake, output and patient weight  - Monitor urine specific gravity, serum osmolarity and serum sodium as indicated or ordered  - Monitor response to interventions for patient's volume status, including labs, urine output, blood pressure (other measures as available)  - Encourage oral intake as appropriate  - Instruct patient on fluid and nutrition restrictions as appropriate  Outcome: Progressing

## 2023-08-19 NOTE — PROGRESS NOTES
08/19/23 1500   Mobility   Activity Resting in bed;Ambulate in room; Up in bathroom; Ambulate in hartley   Level of Assistance Independent after set-up   Assistive Device None   Distance (ft) 250   Ambulation Response Tolerated fairly well   Repositioned Turns self;Pillow support   Positioning Frequency Able to turn self   Head of Bed Elevated HOB Flat   Heels/Feet Foot of bed elevated   Range of Motion Active; All extremities   Transport Mode Bed/Crib   Transport Requirements Tele;Oxygen   O2 walk?  Yes   SPO2% on Oxygen at Rest 93   At rest oxygen flow (liters per minute) 5   SPO2% Ambulation on Oxygen 88   Ambulation oxygen flow (liters per minute) 10

## 2023-08-20 ENCOUNTER — APPOINTMENT (OUTPATIENT)
Dept: GENERAL RADIOLOGY | Facility: HOSPITAL | Age: 53
End: 2023-08-20
Payer: COMMERCIAL

## 2023-08-20 ENCOUNTER — APPOINTMENT (OUTPATIENT)
Dept: CT IMAGING | Facility: HOSPITAL | Age: 53
End: 2023-08-20
Attending: STUDENT IN AN ORGANIZED HEALTH CARE EDUCATION/TRAINING PROGRAM
Payer: COMMERCIAL

## 2023-08-20 LAB
ALBUMIN SERPL-MCNC: 1.4 G/DL (ref 3.4–5)
ALBUMIN/GLOB SERPL: 0.3 {RATIO} (ref 1–2)
ALP LIVER SERPL-CCNC: 136 U/L
ALT SERPL-CCNC: 64 U/L
ANION GAP SERPL CALC-SCNC: 5 MMOL/L (ref 0–18)
ANION GAP SERPL CALC-SCNC: 8 MMOL/L (ref 0–18)
ARTERIAL PATENCY WRIST A: POSITIVE
AST SERPL-CCNC: 48 U/L (ref 15–37)
BASE EXCESS BLDA CALC-SCNC: 9.8 MMOL/L (ref ?–2)
BASOPHILS # BLD AUTO: 0.07 X10(3) UL (ref 0–0.2)
BASOPHILS NFR BLD AUTO: 0.5 %
BASOPHILS NFR PLR: 0 %
BILIRUB SERPL-MCNC: 0.6 MG/DL (ref 0.1–2)
BODY TEMPERATURE: 100.6 F
BUN BLD-MCNC: 8 MG/DL (ref 7–18)
BUN BLD-MCNC: 8 MG/DL (ref 7–18)
CALCIUM BLD-MCNC: 8.7 MG/DL (ref 8.5–10.1)
CALCIUM BLD-MCNC: 9.1 MG/DL (ref 8.5–10.1)
CHLORIDE SERPL-SCNC: 95 MMOL/L (ref 98–112)
CHLORIDE SERPL-SCNC: 95 MMOL/L (ref 98–112)
CO2 SERPL-SCNC: 33 MMOL/L (ref 21–32)
CO2 SERPL-SCNC: 34 MMOL/L (ref 21–32)
COHGB MFR BLD: 1.4 % SAT (ref 0–3)
COLOR FLD: YELLOW
CREAT BLD-MCNC: 0.74 MG/DL
CREAT BLD-MCNC: 0.76 MG/DL
EGFRCR SERPLBLD CKD-EPI 2021: 106 ML/MIN/1.73M2 (ref 60–?)
EGFRCR SERPLBLD CKD-EPI 2021: 107 ML/MIN/1.73M2 (ref 60–?)
EOSINOPHIL # BLD AUTO: 0.12 X10(3) UL (ref 0–0.7)
EOSINOPHIL NFR BLD AUTO: 0.8 %
EOSINOPHIL NFR PLR: 0 %
ERYTHROCYTE [DISTWIDTH] IN BLOOD BY AUTOMATED COUNT: 13 %
GLOBULIN PLAS-MCNC: 5.2 G/DL (ref 2.8–4.4)
GLUCOSE BLD-MCNC: 160 MG/DL (ref 70–99)
GLUCOSE BLD-MCNC: 164 MG/DL (ref 70–99)
GLUCOSE BLD-MCNC: 169 MG/DL (ref 70–99)
GLUCOSE BLD-MCNC: 178 MG/DL (ref 70–99)
GLUCOSE BLD-MCNC: 193 MG/DL (ref 70–99)
GLUCOSE BLD-MCNC: 208 MG/DL (ref 70–99)
GLUCOSE BLD-MCNC: 256 MG/DL (ref 70–99)
GLUCOSE PLR-MCNC: 171 MG/DL
HCO3 BLDA-SCNC: 32.4 MEQ/L (ref 21–27)
HCT VFR BLD AUTO: 40.8 %
HGB BLD-MCNC: 12.5 G/DL
HGB BLD-MCNC: 13 G/DL
IMM GRANULOCYTES # BLD AUTO: 0.19 X10(3) UL (ref 0–1)
IMM GRANULOCYTES NFR BLD: 1.3 %
INR BLD: 1.3 (ref 0.85–1.16)
L/M: 15 L/MIN
LDH FLD L TO P-CCNC: 204 U/L
LYMPHOCYTES # BLD AUTO: 1.69 X10(3) UL (ref 1–4)
LYMPHOCYTES NFR BLD AUTO: 11.7 %
LYMPHOCYTES NFR PLR: 9 %
MCH RBC QN AUTO: 27.5 PG (ref 26–34)
MCHC RBC AUTO-ENTMCNC: 30.6 G/DL (ref 31–37)
MCV RBC AUTO: 89.7 FL
METHGB MFR BLD: 0.4 % SAT (ref 0.4–1.5)
MONOCYTES # BLD AUTO: 1.77 X10(3) UL (ref 0.1–1)
MONOCYTES NFR BLD AUTO: 12.2 %
MONOS+MACROS NFR PLR: 55 %
NEUTROPHILS # BLD AUTO: 10.62 X10 (3) UL (ref 1.5–7.7)
NEUTROPHILS # BLD AUTO: 10.62 X10(3) UL (ref 1.5–7.7)
NEUTROPHILS NFR BLD AUTO: 73.5 %
NEUTROPHILS NFR PLR: 36 %
OSMOLALITY SERPL CALC.SUM OF ELEC: 280 MOSM/KG (ref 275–295)
OSMOLALITY SERPL CALC.SUM OF ELEC: 284 MOSM/KG (ref 275–295)
OXYHGB MFR BLDA: 91.2 % (ref 92–100)
PCO2 BLDA: 59 MM HG (ref 35–45)
PH BLDA: 7.4 [PH] (ref 7.35–7.45)
PLATELET # BLD AUTO: 498 10(3)UL (ref 150–450)
PLATELET # BLD AUTO: 527 10(3)UL (ref 150–450)
PO2 BLDA: 70 MM HG (ref 80–100)
POTASSIUM SERPL-SCNC: 3.4 MMOL/L (ref 3.5–5.1)
POTASSIUM SERPL-SCNC: 3.4 MMOL/L (ref 3.5–5.1)
PROT PLR-MCNC: 3.4 G/DL
PROT SERPL-MCNC: 6.6 G/DL (ref 6.4–8.2)
PROTHROMBIN TIME: 16.2 SECONDS (ref 11.6–14.8)
RBC # BLD AUTO: 4.55 X10(6)UL
RBC PLEURAL FLUID: 9000 /MM3
SODIUM SERPL-SCNC: 134 MMOL/L (ref 136–145)
SODIUM SERPL-SCNC: 136 MMOL/L (ref 136–145)
TOTAL CELLS COUNTED FLD: 100
WBC # BLD AUTO: 14.5 X10(3) UL (ref 4–11)
WBC # PLR: 843 /MM3

## 2023-08-20 PROCEDURE — 32557 INSERT CATH PLEURA W/ IMAGE: CPT | Performed by: STUDENT IN AN ORGANIZED HEALTH CARE EDUCATION/TRAINING PROGRAM

## 2023-08-20 PROCEDURE — 99152 MOD SED SAME PHYS/QHP 5/>YRS: CPT | Performed by: STUDENT IN AN ORGANIZED HEALTH CARE EDUCATION/TRAINING PROGRAM

## 2023-08-20 PROCEDURE — 71045 X-RAY EXAM CHEST 1 VIEW: CPT

## 2023-08-20 PROCEDURE — 0W9B30Z DRAINAGE OF LEFT PLEURAL CAVITY WITH DRAINAGE DEVICE, PERCUTANEOUS APPROACH: ICD-10-PCS | Performed by: RADIOLOGY

## 2023-08-20 PROCEDURE — 99232 SBSQ HOSP IP/OBS MODERATE 35: CPT | Performed by: HOSPITALIST

## 2023-08-20 RX ORDER — MIDAZOLAM HYDROCHLORIDE 1 MG/ML
INJECTION INTRAMUSCULAR; INTRAVENOUS
Status: COMPLETED
Start: 2023-08-20 | End: 2023-08-20

## 2023-08-20 RX ORDER — FLUMAZENIL 0.1 MG/ML
0.2 INJECTION INTRAVENOUS AS NEEDED
Status: DISCONTINUED | OUTPATIENT
Start: 2023-08-20 | End: 2023-08-20 | Stop reason: HOSPADM

## 2023-08-20 RX ORDER — FUROSEMIDE 10 MG/ML
40 INJECTION INTRAMUSCULAR; INTRAVENOUS ONCE
Status: COMPLETED | OUTPATIENT
Start: 2023-08-20 | End: 2023-08-20

## 2023-08-20 RX ORDER — POTASSIUM CHLORIDE 20 MEQ/1
40 TABLET, EXTENDED RELEASE ORAL ONCE
Status: COMPLETED | OUTPATIENT
Start: 2023-08-20 | End: 2023-08-20

## 2023-08-20 RX ORDER — NALOXONE HYDROCHLORIDE 0.4 MG/ML
80 INJECTION, SOLUTION INTRAMUSCULAR; INTRAVENOUS; SUBCUTANEOUS AS NEEDED
Status: DISCONTINUED | OUTPATIENT
Start: 2023-08-20 | End: 2023-08-20 | Stop reason: HOSPADM

## 2023-08-20 RX ORDER — SODIUM CHLORIDE 9 MG/ML
INJECTION, SOLUTION INTRAVENOUS CONTINUOUS
Status: DISCONTINUED | OUTPATIENT
Start: 2023-08-20 | End: 2023-08-20 | Stop reason: HOSPADM

## 2023-08-20 RX ORDER — MIDAZOLAM HYDROCHLORIDE 1 MG/ML
1 INJECTION INTRAMUSCULAR; INTRAVENOUS EVERY 5 MIN PRN
Status: DISCONTINUED | OUTPATIENT
Start: 2023-08-20 | End: 2023-08-20 | Stop reason: HOSPADM

## 2023-08-20 NOTE — PROCEDURES
[facility]  Procedure Note    Minnie Piña Patient Status:  Inpatient    1970 MRN [id number]   [facility] 5NW-A Attending Enoc Dela Cruz MD   Hosp Day # 6 PCP None Pcp     Procedure: Left chest tube placement    Pre-Procedure Diagnosis:  Left pleural effusion    Post-Procedure Diagnosis: Same    Anesthesia:  Sedation    Findings:  Serous fluid    Specimens: 10 mL    Blood Loss:  < 5 cc    Tourniquet Time: none  Complications:  None    Drains:  10 Fr. Pigtail catheter was LOCKED.     Secondary Diagnosis:  N/A    Milton Cardoso MD  2023

## 2023-08-20 NOTE — IMAGING NOTE
Received patient to CT room 1 on 10L high flow O2. Patient seated himself upright on the side of his bed for comfort and ease of breathing. Patient states that when he lays down it is harder to breathe. He states it is difficult for him to lie down without coughing. Patient states NPO since midnight. Patient states that he is on Eliquis. Eliquis was last given 8/19 in the morning. Dr. Kristen Sotelo is aware of last dose and ok to proceed. Lab results of PT/INR and PLT reviewed. Informed consent obtained by Dr. Kristen Sotelo. Dr. Kristen Sotelo notified of patient concerns with difficulty breathing and cough while lying down. Ultrasound was called to CT 1 on standby in event pt unable to tolerate sidelying position on scanner for chest tube placement. Patient positioned on his right side on scanner. CRM and pulse ox monitoring applied and alarms enabled. Patient was placed on 15L high flow O2 during procedure. 10.2 [employer] all purpose drain placed and sutured in place placed to left posterior chest. This was connected to an Momence chest tube drainage system to water seal. All connections foam taped. Sorbaview dressing applied to site. Dressing clean, dry, and intact. No subcutaneous emphysema noted. Patient positioned himself supine in an upright position on his bed. Patient denies pain. Post procedure, upright on his bed, O2 sats between 86-90%. Patient did not appear with increased work of breathing or change of appearance, but did have increased need for O2 delivery. RN placed patient on a 100% non-rebreather with 15 L of flow. SpO2 remained 88-90% on NRB. Dr. Kristen Sotelo notified at [phone] of patient need of increased O2 and returned to CT 1, to accompany Derian Acuna RN and transporter to [facility]. SBAR called to PMU charge RN by Scotty Beverly who notified her that patient was requiring 100% non-rebreather to maintain sats between 87-90%. Patient transported to room 520 on portable monitor. Dr. Kristen Sotelo accompanied team to 5th floor. Bedside report given Pippa Zafar RN and charge RN.

## 2023-08-20 NOTE — PROGRESS NOTES
Received pt at 0700, a&ox4. 7L at rest, 10L with exertion. RA is baseline. Productive cough. ST on tele. Afebrile this shift. Continent x2. No c/o pain. Up SB BRP. Bcx (-) x 5 days. IV zosyn. NPO  with QID accuchecks for chest tube placement today. IVF discontinued. Left extended PIV does not draw back. Hold xarelto. ID ordered Quantiferon TB, no need for isolation per ID. Updated pt and wife on POC, no further questions at this time. 1645- Report called from IR. Pt now on 15L nonrebreather, satting 88%. HR is in the 130s Sinus Tach. Bps elevated during transfer. CT RN stating they cannot facilitate ICU transfer as he is inpatient. CT RN instructed PMU RN to contact pulm and/or hospitalist for them to be present at transfer to discuss direction of care, possibly needing to go to ICU. Pulm MD stated he was not present at hospital and he is the consult instructed PMU RN and PMU charge RN that they call the hospitalist. Hospitalist partner at the bedside. Orders for CBC, CMP, IV lasix as well as a STAT CXR as one was not completed to confirm chest tube placement. Renetta Setters at bedside as well reviewing case and ordering for ABGs. CXR came back improved, labs are stable. Pt overall status improved and appearing more comfortable dangling at EOB on 15L on HFNC. Pt and wife updated on POC and no further questions at this time.      Problem: Patient/Family Goals  Goal: Patient/Family Long Term Goal  Description: Patient's Long Term Goal: to be dsicharged    Interventions:  - follow MD orders  - See additional Care Plan goals for specific interventions  Outcome: Progressing  Goal: Patient/Family Short Term Goal  Description: Patient's Short Term Goal:   08/14NOC: NPO @oooo for thoracentesis tomorrow, pain management  08/17NOC: get sleep and cough less  8/17 AM: Sepsis protocol   8/18: feel better , control cough   8/19 noc; reduce fever  8/19am: tolerate O2 walk   8/19 noc: improve 02 needs  8/20am: get chest tube today     Interventions:   - PRN pain meds  -PRN cough meds  - See additional Care Plan goals for specific interventions  Outcome: Progressing     Problem: RESPIRATORY - ADULT  Goal: Achieves optimal ventilation and oxygenation  Description: INTERVENTIONS:  - Assess for changes in respiratory status  - Assess for changes in mentation and behavior  - Position to facilitate oxygenation and minimize respiratory effort  - Oxygen supplementation based on oxygen saturation or ABGs  - Provide Smoking Cessation handout, if applicable  - Encourage broncho-pulmonary hygiene including cough, deep breathe, Incentive Spirometry  - Assess the need for suctioning and perform as needed  - Assess and instruct to report SOB or any respiratory difficulty  - Respiratory Therapy support as indicated  - Manage/alleviate anxiety  - Monitor for signs/symptoms of CO2 retention  Outcome: Progressing     Problem: METABOLIC/FLUID AND ELECTROLYTES - ADULT  Goal: Glucose maintained within prescribed range  Description: INTERVENTIONS:  - Monitor Blood Glucose as ordered  - Assess for signs and symptoms of hyperglycemia and hypoglycemia  - Administer ordered medications to maintain glucose within target range  - Assess barriers to adequate nutritional intake and initiate nutrition consult as needed  - Instruct patient on self management of diabetes  Outcome: Progressing  Goal: Electrolytes maintained within normal limits  Description: INTERVENTIONS:  - Monitor labs and rhythm and assess patient for signs and symptoms of electrolyte imbalances  - Administer electrolyte replacement as ordered  - Monitor response to electrolyte replacements, including rhythm and repeat lab results as appropriate  - Fluid restriction as ordered  - Instruct patient on fluid and nutrition restrictions as appropriate  Outcome: Progressing  Goal: Hemodynamic stability and optimal renal function maintained  Description: INTERVENTIONS:  - Monitor labs and assess for signs and symptoms of volume excess or deficit  - Monitor intake, output and patient weight  - Monitor urine specific gravity, serum osmolarity and serum sodium as indicated or ordered  - Monitor response to interventions for patient's volume status, including labs, urine output, blood pressure (other measures as available)  - Encourage oral intake as appropriate  - Instruct patient on fluid and nutrition restrictions as appropriate  Outcome: Progressing

## 2023-08-21 ENCOUNTER — APPOINTMENT (OUTPATIENT)
Dept: GENERAL RADIOLOGY | Facility: HOSPITAL | Age: 53
End: 2023-08-21
Attending: INTERNAL MEDICINE
Payer: COMMERCIAL

## 2023-08-21 LAB
GLUCOSE BLD-MCNC: 140 MG/DL (ref 70–99)
GLUCOSE BLD-MCNC: 168 MG/DL (ref 70–99)
GLUCOSE BLD-MCNC: 199 MG/DL (ref 70–99)
GLUCOSE BLD-MCNC: 221 MG/DL (ref 70–99)
POTASSIUM SERPL-SCNC: 3 MMOL/L (ref 3.5–5.1)
POTASSIUM SERPL-SCNC: 3.2 MMOL/L (ref 3.5–5.1)

## 2023-08-21 PROCEDURE — 99233 SBSQ HOSP IP/OBS HIGH 50: CPT | Performed by: HOSPITALIST

## 2023-08-21 PROCEDURE — 71045 X-RAY EXAM CHEST 1 VIEW: CPT | Performed by: INTERNAL MEDICINE

## 2023-08-21 RX ORDER — FUROSEMIDE 10 MG/ML
40 INJECTION INTRAMUSCULAR; INTRAVENOUS ONCE
Status: COMPLETED | OUTPATIENT
Start: 2023-08-21 | End: 2023-08-21

## 2023-08-21 RX ORDER — FUROSEMIDE 10 MG/ML
40 INJECTION INTRAMUSCULAR; INTRAVENOUS DAILY
Status: COMPLETED | OUTPATIENT
Start: 2023-08-22 | End: 2023-08-24

## 2023-08-21 RX ORDER — POTASSIUM CHLORIDE 20 MEQ/1
40 TABLET, EXTENDED RELEASE ORAL ONCE
Status: COMPLETED | OUTPATIENT
Start: 2023-08-21 | End: 2023-08-21

## 2023-08-21 RX ORDER — POTASSIUM CHLORIDE 20 MEQ/1
40 TABLET, EXTENDED RELEASE ORAL EVERY 4 HOURS
Status: COMPLETED | OUTPATIENT
Start: 2023-08-21 | End: 2023-08-21

## 2023-08-21 NOTE — PLAN OF CARE
8/21 AM: Pt is A/O x 4, family at beside. Lung sounds are diminished, 13-15L at rest. Chest tube in place, L posterior chest -20 suction, minimal output so far. BP WNL, Sinus Tachy on tele. No BM today, voids with urinal. Stand by assist. Sputum collected for further testing, encourage IS use.      Problem: Patient/Family Goals  Goal: Patient/Family Long Term Goal  Description: Patient's Long Term Goal: to be dsicharged    Interventions:  - follow MD orders  - See additional Care Plan goals for specific interventions  Outcome: Progressing  Goal: Patient/Family Short Term Goal  Description: Patient's Short Term Goal:   08/14NOC: NPO @oooo for thoracentesis tomorrow, pain management  08/17NOC: get sleep and cough less  8/17 AM: Sepsis protocol   8/18: feel better , control cough   8/19 noc; reduce fever  8/19am: tolerate O2 walk   8/19 noc: improve 02 needs  8/20am: get chest tube today   8/20 noc: Wean 02 needs   8/21 AM: Chest tube, wean O2      Interventions:   - PRN pain meds  -PRN cough meds  - See additional Care Plan goals for specific interventions  Outcome: Progressing

## 2023-08-21 NOTE — PLAN OF CARE
Problem: Necrotizing PNA, Small pleural effusion    Data: Ax04. Very fatigue. Telemetry- sinus tachy 100-110s. Febrile 100.2. Received on 15L HF. New left posterior chest tube. CXR confirmed placement. Chest tube connected to -20 wall suction. Serosanguinous output. Dressing dry and intact. Oral suction for yellow thick sputum. Diminished lung sounds. Continent/ Urinal. Ambulatory, SBA. Sitting up in bed. No IVF. 1800 ADA diet. Accu checks. Eliquis on hold. Potassium replaced. @0625 Pulmonology paged. Patient on high flow 15L overnight. 02 sats 88-91%. Respiratory at the bedside. 93% on 15L HF with humidifier. Vapotherm on standby. Intervention: IV zosyn, Insulin, acetaminophen,   Education: Chest tube management, call light. Medications.        Problem: Patient/Family Goals  Goal: Patient/Family Long Term Goal  Description: Patient's Long Term Goal: to be dsicharged    Interventions:  - follow MD orders  - See additional Care Plan goals for specific interventions  Outcome: Progressing  Goal: Patient/Family Short Term Goal  Description: Patient's Short Term Goal:   08/14NOC: NPO @oooo for thoracentesis tomorrow, pain management  08/17NOC: get sleep and cough less  8/17 AM: Sepsis protocol   8/18: feel better , control cough   8/19 noc; reduce fever  8/19am: tolerate O2 walk   8/19 noc: improve 02 needs  8/20am: get chest tube today   8/20 noc: Wean 02 needs       Interventions:   - PRN pain meds  -PRN cough meds  - See additional Care Plan goals for specific interventions  Outcome: Progressing     Problem: RESPIRATORY - ADULT  Goal: Achieves optimal ventilation and oxygenation  Description: INTERVENTIONS:  - Assess for changes in respiratory status  - Assess for changes in mentation and behavior  - Position to facilitate oxygenation and minimize respiratory effort  - Oxygen supplementation based on oxygen saturation or ABGs  - Provide Smoking Cessation handout, if applicable  - Encourage broncho-pulmonary hygiene including cough, deep breathe, Incentive Spirometry  - Assess the need for suctioning and perform as needed  - Assess and instruct to report SOB or any respiratory difficulty  - Respiratory Therapy support as indicated  - Manage/alleviate anxiety  - Monitor for signs/symptoms of CO2 retention  Outcome: Progressing     Problem: METABOLIC/FLUID AND ELECTROLYTES - ADULT  Goal: Glucose maintained within prescribed range  Description: INTERVENTIONS:  - Monitor Blood Glucose as ordered  - Assess for signs and symptoms of hyperglycemia and hypoglycemia  - Administer ordered medications to maintain glucose within target range  - Assess barriers to adequate nutritional intake and initiate nutrition consult as needed  - Instruct patient on self management of diabetes  Outcome: Progressing  Goal: Electrolytes maintained within normal limits  Description: INTERVENTIONS:  - Monitor labs and rhythm and assess patient for signs and symptoms of electrolyte imbalances  - Administer electrolyte replacement as ordered  - Monitor response to electrolyte replacements, including rhythm and repeat lab results as appropriate  - Fluid restriction as ordered  - Instruct patient on fluid and nutrition restrictions as appropriate  Outcome: Progressing  Goal: Hemodynamic stability and optimal renal function maintained  Description: INTERVENTIONS:  - Monitor labs and assess for signs and symptoms of volume excess or deficit  - Monitor intake, output and patient weight  - Monitor urine specific gravity, serum osmolarity and serum sodium as indicated or ordered  - Monitor response to interventions for patient's volume status, including labs, urine output, blood pressure (other measures as available)  - Encourage oral intake as appropriate  - Instruct patient on fluid and nutrition restrictions as appropriate  Outcome: Progressing

## 2023-08-21 NOTE — PROGRESS NOTES
08/20/23 0878   Provider Notification   Reason for Communication Review case   Provider Name Karina Sullivan MD  (Dr. Shaji Macdonald)   Method of Communication Call   Response Waiting for response   Notification Time [phone]     Call to Dr. Richard Shah (ID) - Hospitalist asking for ID input on case as there is no signs of improvement with IV zosyn, inquiring about potential to start vanco.    Call back from Dr. Luana Rollins stating that at this time there is no evidence to support use of vanco. Symptoms should decrease as fluid is drained from CT and if no improvement, may need to explore possibility of a bronch at a later date.

## 2023-08-22 ENCOUNTER — APPOINTMENT (OUTPATIENT)
Dept: GENERAL RADIOLOGY | Facility: HOSPITAL | Age: 53
End: 2023-08-22
Attending: NURSE PRACTITIONER
Payer: COMMERCIAL

## 2023-08-22 LAB
ANION GAP SERPL CALC-SCNC: 9 MMOL/L (ref 0–18)
ARTERIAL PATENCY WRIST A: POSITIVE
ARTERIAL PATENCY WRIST A: POSITIVE
BASE EXCESS BLDA CALC-SCNC: 11.8 MMOL/L (ref ?–2)
BASE EXCESS BLDA CALC-SCNC: 12.5 MMOL/L (ref ?–2)
BASOPHILS # BLD: 0 X10(3) UL (ref 0–0.2)
BASOPHILS NFR BLD: 0 %
BODY TEMPERATURE: 98.6 F
BODY TEMPERATURE: 98.6 F
BUN BLD-MCNC: 12 MG/DL (ref 7–18)
CALCIUM BLD-MCNC: 8.2 MG/DL (ref 8.5–10.1)
CHLORIDE SERPL-SCNC: 87 MMOL/L (ref 98–112)
CO2 SERPL-SCNC: 32 MMOL/L (ref 21–32)
COHGB MFR BLD: 1.2 % SAT (ref 0–3)
COHGB MFR BLD: 1.6 % SAT (ref 0–3)
CREAT BLD-MCNC: 0.8 MG/DL
EGFRCR SERPLBLD CKD-EPI 2021: 105 ML/MIN/1.73M2 (ref 60–?)
EOSINOPHIL # BLD: 0.19 X10(3) UL (ref 0–0.7)
EOSINOPHIL NFR BLD: 1 %
ERYTHROCYTE [DISTWIDTH] IN BLOOD BY AUTOMATED COUNT: 13.2 %
EXPIRATORY PRESSURE: 10 CM H2O
EXPIRATORY PRESSURE: 7 CM H2O
FIO2: 100 %
FIO2: 100 %
GLUCOSE BLD-MCNC: 107 MG/DL (ref 70–99)
GLUCOSE BLD-MCNC: 107 MG/DL (ref 70–99)
GLUCOSE BLD-MCNC: 137 MG/DL (ref 70–99)
GLUCOSE BLD-MCNC: 143 MG/DL (ref 70–99)
GLUCOSE BLD-MCNC: 88 MG/DL (ref 70–99)
GLUCOSE BLD-MCNC: 91 MG/DL (ref 70–99)
HCO3 BLDA-SCNC: 34 MEQ/L (ref 21–27)
HCO3 BLDA-SCNC: 34.7 MEQ/L (ref 21–27)
HCT VFR BLD AUTO: 39.4 %
HGB BLD-MCNC: 12.5 G/DL
HGB BLD-MCNC: 13.1 G/DL
HGB BLD-MCNC: 13.2 G/DL
INSP PRESSURE: 15 CM H2O
INSP PRESSURE: 15 CM H2O
LYMPHOCYTES NFR BLD: 0.19 X10(3) UL (ref 1–4)
LYMPHOCYTES NFR BLD: 1 %
MCH RBC QN AUTO: 27.4 PG (ref 26–34)
MCHC RBC AUTO-ENTMCNC: 31.7 G/DL (ref 31–37)
MCV RBC AUTO: 86.4 FL
METHGB MFR BLD: 0.5 % SAT (ref 0.4–1.5)
METHGB MFR BLD: 0.5 % SAT (ref 0.4–1.5)
MONOCYTES # BLD: 0.56 X10(3) UL (ref 0.1–1)
MONOCYTES NFR BLD: 3 %
MYELOCYTES # BLD: 0.75 X10(3) UL
MYELOCYTES NFR BLD: 4 %
NEUTROPHILS # BLD AUTO: 15.75 X10 (3) UL (ref 1.5–7.7)
NEUTROPHILS NFR BLD: 66 %
NEUTS BAND NFR BLD: 25 %
NEUTS HYPERSEG # BLD: 17.11 X10(3) UL (ref 1.5–7.7)
NEUTS VAC BLD QL SMEAR: PRESENT
OSMOLALITY SERPL CALC.SUM OF ELEC: 266 MOSM/KG (ref 275–295)
OXYHGB MFR BLDA: 93.1 % (ref 92–100)
OXYHGB MFR BLDA: 97.5 % (ref 92–100)
PCO2 BLDA: 53 MM HG (ref 35–45)
PCO2 BLDA: 61 MM HG (ref 35–45)
PH BLDA: 7.42 [PH] (ref 7.35–7.45)
PH BLDA: 7.46 [PH] (ref 7.35–7.45)
PLATELET # BLD AUTO: 457 10(3)UL (ref 150–450)
PO2 BLDA: 117 MM HG (ref 80–100)
PO2 BLDA: 69 MM HG (ref 80–100)
POTASSIUM SERPL-SCNC: 3.3 MMOL/L (ref 3.5–5.1)
POTASSIUM SERPL-SCNC: 3.5 MMOL/L (ref 3.5–5.1)
RBC # BLD AUTO: 4.56 X10(6)UL
SODIUM SERPL-SCNC: 128 MMOL/L (ref 136–145)
TOTAL CELLS COUNTED BLD: 100
UR GALACT AG QN: >15 NG/ML
UR GALACT AG QN: >15 NG/ML
UR GALACT AG SCR: POSITIVE
WBC # BLD AUTO: 18.8 X10(3) UL (ref 4–11)

## 2023-08-22 PROCEDURE — 5A09357 ASSISTANCE WITH RESPIRATORY VENTILATION, LESS THAN 24 CONSECUTIVE HOURS, CONTINUOUS POSITIVE AIRWAY PRESSURE: ICD-10-PCS | Performed by: INTERNAL MEDICINE

## 2023-08-22 PROCEDURE — 71045 X-RAY EXAM CHEST 1 VIEW: CPT | Performed by: NURSE PRACTITIONER

## 2023-08-22 PROCEDURE — 99291 CRITICAL CARE FIRST HOUR: CPT | Performed by: NURSE PRACTITIONER

## 2023-08-22 PROCEDURE — 99233 SBSQ HOSP IP/OBS HIGH 50: CPT | Performed by: HOSPITALIST

## 2023-08-22 RX ORDER — IPRATROPIUM BROMIDE AND ALBUTEROL SULFATE 2.5; .5 MG/3ML; MG/3ML
3 SOLUTION RESPIRATORY (INHALATION) 2 TIMES DAILY
Status: DISCONTINUED | OUTPATIENT
Start: 2023-08-22 | End: 2023-09-02

## 2023-08-22 RX ORDER — FUROSEMIDE 10 MG/ML
40 INJECTION INTRAMUSCULAR; INTRAVENOUS ONCE
Status: COMPLETED | OUTPATIENT
Start: 2023-08-22 | End: 2023-08-22

## 2023-08-22 RX ORDER — METHYLPREDNISOLONE SODIUM SUCCINATE 125 MG/2ML
100 INJECTION, POWDER, LYOPHILIZED, FOR SOLUTION INTRAMUSCULAR; INTRAVENOUS DAILY
Status: DISCONTINUED | OUTPATIENT
Start: 2023-08-22 | End: 2023-08-28

## 2023-08-22 RX ORDER — DIPHENHYDRAMINE HCL 25 MG
50 CAPSULE ORAL EVERY 24 HOURS
Status: DISCONTINUED | OUTPATIENT
Start: 2023-08-22 | End: 2023-08-23

## 2023-08-22 RX ORDER — ACETAMINOPHEN 10 MG/ML
1000 INJECTION, SOLUTION INTRAVENOUS EVERY 6 HOURS PRN
Status: DISCONTINUED | OUTPATIENT
Start: 2023-08-22 | End: 2023-09-06

## 2023-08-22 RX ORDER — ACETAMINOPHEN 325 MG/1
650 TABLET ORAL EVERY 24 HOURS
Status: DISCONTINUED | OUTPATIENT
Start: 2023-08-22 | End: 2023-08-23

## 2023-08-22 RX ORDER — DEXMEDETOMIDINE HYDROCHLORIDE 4 UG/ML
INJECTION, SOLUTION INTRAVENOUS CONTINUOUS
Status: DISCONTINUED | OUTPATIENT
Start: 2023-08-22 | End: 2023-08-28

## 2023-08-22 NOTE — PROGRESS NOTES
NURSING TRANSFER NOTE      Patient admitted via  BED  Oriented to room. Safety precautions initiated. Bed in low position. Call light in reach. Received patient in ICU from [facility]. Patient in respiratory distress on non rebreather. O2 sats 85-86%, transitioned to 100% vapotherm, O2 sats 88%, transitioned to 100% Avps, sats recovered to 92%. Increased work of breathing, RR >40. Patient is lethargic, oriented x4. Chest tube to -20 suction, site assessed with PMU RN, tube intact, all tubing intact, no ar leak. ST on tele, rates 120s. Normotensive. Wife at bedside, updated on plan of care. Plan to start anti fungal infusion this evening.

## 2023-08-22 NOTE — CM/SW NOTE
Care Progression Note:  Length of stay: 8  GMLOS: 5.2  Avoidable Delays:   Code Status: Full Code    Acute Medical Issue/Factors:   Necrotizing pneumonia (HCC)-Receiving IV Zosyn and Voriconazole. Managed by ID. Requiring 15L/High flow nasal canula this am. Also febrile and tachycardic. Transferred to ICU. Pt remains on 15L/High Flow NC, saturating 90%. Vapotherm on standby per Pulm notes. Left sided chest tube in place, output only 20 ml this am. Per MD notes, possible removal tomorrow if output remains low. Discharge Barriers: Clinical improvement, decrease in oxygen needs. Expected discharge date: TBD  Expected next site of care: Home. Despite his oxygen needs, pt is only requiring minimal assistance. He lives at home w/ family. SW/CM will continue to eval for pt's oxygen needs and any other DC needs. / available for discharge planning.        CELESTE Blankenship, VIA [facility]    [id number]

## 2023-08-22 NOTE — PROGRESS NOTES
Patient transferred to ICU for increasing O2 needs and monitoring closely of electrolytes with initiation of ambisome per ID. He has been being treated empirically with zosyn and voriconazole for suspected necrotizing CAP with pleural effusion. Histo ag positive, therefore, high likelyhood of pulmonary histoplasmosis. Antbx and antifungals changed per ID. Potassium and Mg to be monitored closely while on ambisome. Upon arrival to ICU, O2 sats 88% on VT 40/100% with tachypnea in the 40s. Patient dusky in color and appears to have some IWOB. Patient states breathing continues to get a little more difficult, especially if not sitting up in bed. STAT CXR and ABG ordered. Patient to be placed on BIPAP. Patient and family updated. Low threshold for intubation. Full code confirmed by patient. Addendum:  Discussed with critical care on-call, Dr. Chandler Mcmillan. Plan to give additional dose of IV lasix 40mg. Increased EPAP to 10. Preceex gtt as patient is starting to feel anxious on AVAPS.     MARI Ritchie-BC  ICU  Phone  [phone]   Pager [phone]    Critical care time 35 minutes

## 2023-08-22 NOTE — PLAN OF CARE
Assumed care of Josh at 0130; sitting up in chair for comfort. He is alert/oriented x 4. ROLDAN x 4. Initially on Hi-flow/NC @ 11 L. Increased to 15 L to keep sats >92%. With productive cough (tan and thick). L upper back chest tube(at 20cm suction) intact. No crepitus, no air leak. L lobe diminished; clear on R lobe on auscultation. ST on tele with -115/min. BLLE noted. Zosyn/IV and Voriconazole given as scheduled. Plan of care reinforced. Will continue to monitor.

## 2023-08-22 NOTE — PROGRESS NOTES
Received pt A&Ox4.  on 11L, RA is baseline. Vapo on standby. CT to L upper back - dressing clean/dry/intact. CT to -20 suction, draining serous fluid. Receiving IV zosyn and IV voriconazole. Awaiting sputum results. ST on tele. Receiving eliquis. Tmax 100.3F. Tolerating diet, accuchecks QID. Voids via urinal/BSC with SBA. Denies Pain. Plan of care continues, no further needs at this time. 2300: Report endorsed to [employer]

## 2023-08-22 NOTE — PLAN OF CARE
8/22 AM: Pt is A/O x 4, family at beside. Lung sounds are diminished, 15L at rest. Chest tube in place, L posterior chest -20 suction, minimal output so far. BP WNL, Sinus Tachy on tele. No BM today, voids with urinal. Stand by assist.    Temp high 101.1 - Tylenol administered      ICU Transfer ordered by Pulm and ID, report given to ICU nurse. Patient switched to NRB before transport.      Problem: Patient/Family Goals  Goal: Patient/Family Long Term Goal  Description: Patient's Long Term Goal: to be dsicharged    Interventions:  - follow MD orders  - See additional Care Plan goals for specific interventions  Outcome: Progressing  Goal: Patient/Family Short Term Goal  Description: Patient's Short Term Goal:   08/14NOC: NPO @oooo for thoracentesis tomorrow, pain management  08/17NOC: get sleep and cough less  8/17 AM: Sepsis protocol   8/18: feel better , control cough   8/19 noc; reduce fever  8/19am: tolerate O2 walk   8/19 noc: improve 02 needs  8/20am: get chest tube today   8/20 noc: Wean 02 needs   8/21 AM: Chest tube, wean O2  8/22 AM: Wean O2      Interventions:   - PRN pain meds  -PRN cough meds  - See additional Care Plan goals for specific interventions  Outcome: Progressing

## 2023-08-23 LAB
ANION GAP SERPL CALC-SCNC: 8 MMOL/L (ref 0–18)
ARTERIAL PATENCY WRIST A: POSITIVE
ATRIAL RATE: 118 BPM
BASE EXCESS BLDA CALC-SCNC: 9.1 MMOL/L (ref ?–2)
BASOPHILS # BLD: 0 X10(3) UL (ref 0–0.2)
BASOPHILS NFR BLD: 0 %
BODY TEMPERATURE: 98.6 F
BUN BLD-MCNC: 20 MG/DL (ref 7–18)
CALCIUM BLD-MCNC: 8.6 MG/DL (ref 8.5–10.1)
CHLORIDE SERPL-SCNC: 89 MMOL/L (ref 98–112)
CO2 SERPL-SCNC: 36 MMOL/L (ref 21–32)
COHGB MFR BLD: 1.3 % SAT (ref 0–3)
CREAT BLD-MCNC: 0.96 MG/DL
EGFRCR SERPLBLD CKD-EPI 2021: 93 ML/MIN/1.73M2 (ref 60–?)
EOSINOPHIL # BLD: 0 X10(3) UL (ref 0–0.7)
EOSINOPHIL NFR BLD: 0 %
ERYTHROCYTE [DISTWIDTH] IN BLOOD BY AUTOMATED COUNT: 13.2 %
EXPIRATORY PRESSURE: 10 CM H2O
FIO2: 100 %
GLUCOSE BLD-MCNC: 176 MG/DL (ref 70–99)
GLUCOSE BLD-MCNC: 176 MG/DL (ref 70–99)
GLUCOSE BLD-MCNC: 196 MG/DL (ref 70–99)
GLUCOSE BLD-MCNC: 239 MG/DL (ref 70–99)
GLUCOSE BLD-MCNC: 310 MG/DL (ref 70–99)
HCO3 BLDA-SCNC: 32 MEQ/L (ref 21–27)
HCT VFR BLD AUTO: 38.4 %
HGB BLD-MCNC: 11.9 G/DL
HGB BLD-MCNC: 12.2 G/DL
IPAP MAX: 30 CM H2O
IPAP MIN: 20 CM H2O
LYMPHOCYTES NFR BLD: 1.44 X10(3) UL (ref 1–4)
LYMPHOCYTES NFR BLD: 9 %
M TB IFN-G CD4+ T-CELLS BLD-ACNC: 0.03 IU/ML
M TB TUBERC IFN-G BLD QL: POSITIVE
M TB TUBERC IGNF/MITOGEN IGNF CONTROL: 0.81 IU/ML
MAGNESIUM SERPL-MCNC: 2.3 MG/DL (ref 1.6–2.6)
MAGNESIUM SERPL-MCNC: 2.6 MG/DL (ref 1.6–2.6)
MCH RBC QN AUTO: 27.3 PG (ref 26–34)
MCHC RBC AUTO-ENTMCNC: 31.8 G/DL (ref 31–37)
MCV RBC AUTO: 85.9 FL
METHGB MFR BLD: 0.6 % SAT (ref 0.4–1.5)
MONOCYTES # BLD: 0.48 X10(3) UL (ref 0.1–1)
MONOCYTES NFR BLD: 3 %
MORPHOLOGY: NORMAL
MRSA DNA SPEC QL NAA+PROBE: NEGATIVE
MYELOCYTES # BLD: 0.16 X10(3) UL
MYELOCYTES NFR BLD: 1 %
NEUTROPHILS # BLD AUTO: 12.81 X10 (3) UL (ref 1.5–7.7)
NEUTROPHILS NFR BLD: 50 %
NEUTS BAND NFR BLD: 37 %
NEUTS HYPERSEG # BLD: 13.92 X10(3) UL (ref 1.5–7.7)
NON GYNE INTERPRETATION: NEGATIVE
OSMOLALITY SERPL CALC.SUM OF ELEC: 283 MOSM/KG (ref 275–295)
OXYHGB MFR BLDA: 97.5 % (ref 92–100)
P AXIS: 49 DEGREES
P-R INTERVAL: 118 MS
PCO2 BLDA: 56 MM HG (ref 35–45)
PH BLDA: 7.41 [PH] (ref 7.35–7.45)
PLATELET # BLD AUTO: 444 10(3)UL (ref 150–450)
PLATELET MORPHOLOGY: NORMAL
PO2 BLDA: 109 MM HG (ref 80–100)
POTASSIUM SERPL-SCNC: 3.6 MMOL/L (ref 3.5–5.1)
POTASSIUM SERPL-SCNC: 3.9 MMOL/L (ref 3.5–5.1)
Q-T INTERVAL: 288 MS
QFT TB1 AG MINUS NIL: 0.53 IU/ML
QFT TB2 AG MINUS NIL: 0.42 IU/ML
QRS DURATION: 78 MS
QTC CALCULATION (BEZET): 403 MS
R AXIS: 15 DEGREES
RBC # BLD AUTO: 4.47 X10(6)UL
SODIUM SERPL-SCNC: 133 MMOL/L (ref 136–145)
T AXIS: 16 DEGREES
TIDAL VOLUME: 500 ML
TOTAL CELLS COUNTED BLD: 100
VENTRICULAR RATE: 118 BPM
WBC # BLD AUTO: 16 X10(3) UL (ref 4–11)

## 2023-08-23 PROCEDURE — 99291 CRITICAL CARE FIRST HOUR: CPT | Performed by: INTERNAL MEDICINE

## 2023-08-23 RX ORDER — POTASSIUM CHLORIDE 20 MEQ/1
40 TABLET, EXTENDED RELEASE ORAL EVERY 4 HOURS
Status: COMPLETED | OUTPATIENT
Start: 2023-08-23 | End: 2023-08-23

## 2023-08-23 NOTE — PROGRESS NOTES
ABG reviewed on BIPAP 15/10, 100%. pH improved but slight increase in pCO2. Noted to be puling less TV--down to 250-260 range. Will change to AVAPS rate 20, , IPAP 20-30 and EPAP 10 to try and pull more consistent volumes.   RT at bedside to make changes  [facility] updated    Tina Castillo 3 NP  Jemima 227: [phone]  Pgr: [phone]

## 2023-08-23 NOTE — PROGRESS NOTES
Took over pt care. Pt on AVAPs. Nubia well. Switched over to Vapotherm. Nubia well. Ate lunch. Up to chair. Able to cough up sputum. Able to walk to bathroom. 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 Ordered to send AFB sputum and place in isolation. Done. Pt sitting up in chair. States he \"feels better\" Family updated on status and plan of care.

## 2023-08-23 NOTE — PROGRESS NOTES
[facility]  Progress Note      Danny Rojas Patient Status:  Inpatient    1970 MRN [id number]   [facility] 4SW-A Attending Gini Mendes MD   Hosp Day # 9 PCP None Pcp       52year-old male with left lung consolidation with pleural effusion s/p chest tube placement    - Outputs from chest tube decreasing  - Consider repeat CT chest to re-evaluate fluid  - If no significant residual fluid, can consider removal.   - Pulmonary following    Flavia Morris MD  2023  2:15 PM

## 2023-08-23 NOTE — PLAN OF CARE
Assumed care of patient after shift report. Patient was drowsy, but arousable and oriented. Put on Avaps 100% FiO2. He is now alert this AM. Chest tube x1 to -20 cm continuous suction. Amphotericin B infusion started last night.

## 2023-08-23 NOTE — PROGRESS NOTES
[address] 62 Patient Status:  Inpatient    1970 MRN [id number]   [facility] 4SW-A Attending Titus Cerrato MD   Hosp Day # 9 PCP None Pcp     Pulm / Critical Care Progress Note     S: urinary histo ag + yesterday; switched from vori to ampho and also iv steroids added. On avaps 100% fio2 when seen      Scheduled Medication:   insulin aspart  1-30 Units Subcutaneous 4 times per day    insulin detemir  22 Units Subcutaneous BID    ipratropium-albuterol  3 mL Nebulization BID    methylPREDNISolone  100 mg Intravenous Daily    amphotericin b liposomal  3 mg/kg (Adjusted) Intravenous Q24H    sodium chloride  500 mL Intravenous Q24H    acetaminophen  650 mg Oral Q24H    diphenhydrAMINE  50 mg Oral Q24H    furosemide  40 mg Intravenous Daily    apixaban  5 mg Oral BID    dilTIAZem ER  120 mg Oral Daily    insulin aspart  1-68 Units Subcutaneous TID CC     Continuous Infusing Medication:   dexmedetomidine       PRN Medication:acetaminophen, HYDROcodone-homatropine, benzocaine-menthol, glucose **OR** glucose **OR** glucose-vitamin C **OR** dextrose **OR** glucose **OR** glucose **OR** glucose-vitamin C, acetaminophen, melatonin, guaiFENesin ER, benzonatate, ondansetron, prochlorperazine, polyethylene glycol (PEG 3350), sennosides, bisacodyl, fleet enema, ipratropium-albuterol, ibuprofen       OBJECTIVE:   23  0500 23  0510 23  0600 23  0700   BP:   95/54 112/61   BP Location:       Pulse: 99 95 94 91   Resp: (!) 27 (!) 30 (!) 27 (!) 31   Temp:       TempSrc:       SpO2: 95% 95% 96% 97%   Weight:       Height:            O2: avaps 100% fio2      Wt Readings from Last 3 Encounters:  23 : 290 lb (131.5 kg)  23 : 293 lb 3.4 oz (133 kg)  23 : 295 lb (133.8 kg)       I/O last 3 completed shifts: In: 1383.8 [I.V.:523.8; IV PIGGYBACK:860]  Out: 8859 [Urine:2550; Chest Tube:40]  No intake/output data recorded.      Physical Exam:   General: alert, cooperative, on avaps   Head: Normocephalic, without obvious abnormality, atraumatic. Lungs: diminished on L   Chest wall: ct on L, no air leak   Heart: Regular rate and rhythm, normal S1S2, no murmur. Abdomen: soft, non-tender, non-distended, positive BS. Extremity: no edema     Recent Labs   Lab 08/20/23  1702 08/22/23  1845 08/23/23  0437   WBC 14.5* 18.8* 16.0*   HGB 12.5* 12.5* 12.2*   HCT 40.8 39.4 38.4*   .0* 457.0* 444.0     Recent Labs   Lab 08/20/23  1012   INR 1.30*         Recent Labs   Lab 08/20/23  1701 08/21/23  0648 08/22/23  0638 08/22/23 1845 08/23/23  0437     134*  --   --  128* 133*   K 3.4*  3.4*   < > 3.3* 3.5 3.6   CL 95*  95*  --   --  87* 89*   CO2 33.0*  34.0*  --   --  32.0 36.0*   BUN 8  8  --   --  12 20*   CREATSERUM 0.76  0.74  --   --  0.80 0.96   *  164*  --   --  107* 176*   CA 8.7  9.1  --   --  8.2* 8.6   AST 48*  --   --   --   --    ALT 64*  --   --   --   --    ALKPHO 136*  --   --   --   --    BILT 0.6  --   --   --   --    ALB 1.4*  --   --   --   --    TP 6.6  --   --   --   --     < > = values in this interval not displayed. Creatinine (mg/dL)   Date Value   08/23/2023 0.96   08/22/2023 0.80   08/20/2023 0.74   08/20/2023 0.76   ]        Cx: ngtd; urinary histo ag +        ASSESSMENT/PLAN:    Acute hypoxic resp failure- due to large L sided community acquired PNA and associated effusion  - wean O2 as tolerated- on avaps 100% fio2.   - bronchial hygiene as tolerated. - at risk of requiring intubation. ID- necrotizing PNA with associated effusion. Urinary histo ag +  - worsening pneumonia and effusion on left noted on CT chest (8/18)- suspect some component of atelectasis  - s/p chest tube placed by IR- output has dropped over the last 24hrs, if it remains low today would consider pulling tube however would like repeat ct assessment to ensure no significant loculated effusion as cxr is difficult to interpret.  Hold on ct given resp status at this time. Keep ct in place. - cultures remain negative; ID input appreciated  - urine strep/legionella negative, cultures in process; sputum culture with oral lexus  -PCT mildly elevated  - abx per ID; ampho started 8/22; s/p  zosyn 8/14-8/22  Pleural effusion/Fluid overload-  -s/p IR chest tube on 8/20  -diuresis per cards  -monitor output  A fib: new on set  - on eliquis  Proph  - cont eliquis, monitor for bleeding  Dispo- Full code  - will follow. Critically ill with risk for further decompensation    Critical Care Time greater than: 45 minutes      James Blank M.D.   [facility]  Pulmonary / Critical care  8/23/2023  7:05 AM

## 2023-08-24 ENCOUNTER — APPOINTMENT (OUTPATIENT)
Dept: GENERAL RADIOLOGY | Facility: HOSPITAL | Age: 53
End: 2023-08-24
Attending: RADIOLOGY
Payer: COMMERCIAL

## 2023-08-24 PROBLEM — B49 FUNGAL PNEUMONIA: Status: ACTIVE | Noted: 2023-08-24

## 2023-08-24 PROBLEM — J16.8 FUNGAL PNEUMONIA: Status: ACTIVE | Noted: 2023-08-24

## 2023-08-24 PROBLEM — N17.9 AKI (ACUTE KIDNEY INJURY) (HCC): Status: ACTIVE | Noted: 2023-08-24

## 2023-08-24 PROBLEM — N17.9 AKI (ACUTE KIDNEY INJURY): Status: ACTIVE | Noted: 2023-08-24

## 2023-08-24 LAB
ANION GAP SERPL CALC-SCNC: 6 MMOL/L (ref 0–18)
BASOPHILS # BLD AUTO: 0.07 X10(3) UL (ref 0–0.2)
BASOPHILS NFR BLD AUTO: 0.5 %
BILIRUB UR QL STRIP.AUTO: NEGATIVE
BLASTOMYCES ABS QN DID: NEGATIVE
BUN BLD-MCNC: 54 MG/DL (ref 7–18)
CALCIUM BLD-MCNC: 8.5 MG/DL (ref 8.5–10.1)
CHLORIDE SERPL-SCNC: 91 MMOL/L (ref 98–112)
CLARITY UR REFRACT.AUTO: CLEAR
CO2 SERPL-SCNC: 35 MMOL/L (ref 21–32)
CREAT BLD-MCNC: 1.41 MG/DL
CREAT UR-SCNC: 102 MG/DL
EGFRCR SERPLBLD CKD-EPI 2021: 59 ML/MIN/1.73M2 (ref 60–?)
EOSINOPHIL # BLD AUTO: 0 X10(3) UL (ref 0–0.7)
EOSINOPHIL NFR BLD AUTO: 0 %
EOSINOPHIL URNS QL WRIGHT STN: NEGATIVE
ERYTHROCYTE [DISTWIDTH] IN BLOOD BY AUTOMATED COUNT: 12.9 %
GLUCOSE BLD-MCNC: 215 MG/DL (ref 70–99)
GLUCOSE BLD-MCNC: 227 MG/DL (ref 70–99)
GLUCOSE BLD-MCNC: 246 MG/DL (ref 70–99)
GLUCOSE BLD-MCNC: 269 MG/DL (ref 70–99)
GLUCOSE BLD-MCNC: 293 MG/DL (ref 70–99)
GLUCOSE BLD-MCNC: 300 MG/DL (ref 70–99)
GLUCOSE BLD-MCNC: 332 MG/DL (ref 70–99)
GLUCOSE UR STRIP.AUTO-MCNC: 30 MG/DL
HCT VFR BLD AUTO: 37.6 %
HGB BLD-MCNC: 12 G/DL
HISTOPLASMA ABS, QN, DID: NEGATIVE
IMM GRANULOCYTES # BLD AUTO: 0.46 X10(3) UL (ref 0–1)
IMM GRANULOCYTES NFR BLD: 3.1 %
KETONES UR STRIP.AUTO-MCNC: NEGATIVE MG/DL
LEUKOCYTE ESTERASE UR QL STRIP.AUTO: NEGATIVE
LYMPHOCYTES # BLD AUTO: 1.14 X10(3) UL (ref 1–4)
LYMPHOCYTES NFR BLD AUTO: 7.7 %
MAGNESIUM SERPL-MCNC: 2.8 MG/DL (ref 1.6–2.6)
MAGNESIUM SERPL-MCNC: 2.8 MG/DL (ref 1.6–2.6)
MCH RBC QN AUTO: 27.5 PG (ref 26–34)
MCHC RBC AUTO-ENTMCNC: 31.9 G/DL (ref 31–37)
MCV RBC AUTO: 86 FL
MONOCYTES # BLD AUTO: 1 X10(3) UL (ref 0.1–1)
MONOCYTES NFR BLD AUTO: 6.8 %
MVISTA(R) BLASTOMYCES AG: 11.72 NG/ML
NEUTROPHILS # BLD AUTO: 12.07 X10 (3) UL (ref 1.5–7.7)
NEUTROPHILS # BLD AUTO: 12.07 X10(3) UL (ref 1.5–7.7)
NEUTROPHILS NFR BLD AUTO: 81.9 %
NITRITE UR QL STRIP.AUTO: NEGATIVE
OSMOLALITY SERPL CALC.SUM OF ELEC: 300 MOSM/KG (ref 275–295)
PH UR STRIP.AUTO: 5 [PH] (ref 5–8)
PLATELET # BLD AUTO: 494 10(3)UL (ref 150–450)
POTASSIUM SERPL-SCNC: 3.9 MMOL/L (ref 3.5–5.1)
PROT UR STRIP.AUTO-MCNC: NEGATIVE MG/DL
RBC # BLD AUTO: 4.37 X10(6)UL
RBC UR QL AUTO: NEGATIVE
SODIUM SERPL-SCNC: 132 MMOL/L (ref 136–145)
SODIUM SERPL-SCNC: 27 MMOL/L
SP GR UR STRIP.AUTO: 1.01 (ref 1–1.03)
UROBILINOGEN UR STRIP.AUTO-MCNC: NORMAL MG/DL
WBC # BLD AUTO: 14.7 X10(3) UL (ref 4–11)

## 2023-08-24 PROCEDURE — 99291 CRITICAL CARE FIRST HOUR: CPT | Performed by: INTERNAL MEDICINE

## 2023-08-24 PROCEDURE — 87556 M.TUBERCULO DNA AMP PROBE: CPT

## 2023-08-24 PROCEDURE — 87798 DETECT AGENT NOS DNA AMP: CPT

## 2023-08-24 PROCEDURE — 99223 1ST HOSP IP/OBS HIGH 75: CPT | Performed by: INTERNAL MEDICINE

## 2023-08-24 PROCEDURE — 71045 X-RAY EXAM CHEST 1 VIEW: CPT | Performed by: RADIOLOGY

## 2023-08-24 PROCEDURE — 99233 SBSQ HOSP IP/OBS HIGH 50: CPT | Performed by: INTERNAL MEDICINE

## 2023-08-24 RX ORDER — ALBUMIN (HUMAN) 12.5 G/50ML
12.5 SOLUTION INTRAVENOUS ONCE
Status: COMPLETED | OUTPATIENT
Start: 2023-08-24 | End: 2023-08-24

## 2023-08-24 RX ORDER — FUROSEMIDE 10 MG/ML
40 INJECTION INTRAMUSCULAR; INTRAVENOUS ONCE
Status: COMPLETED | OUTPATIENT
Start: 2023-08-24 | End: 2023-08-24

## 2023-08-24 RX ORDER — ECHINACEA PURPUREA EXTRACT 125 MG
1 TABLET ORAL
Status: DISCONTINUED | OUTPATIENT
Start: 2023-08-24 | End: 2023-09-06

## 2023-08-24 NOTE — PROGRESS NOTES
Assumed care of patient following shift report. Patient is alert and oriented. Received on 100% vapotherm. Chest tube to left posterior chest wall intact, remains to -20 suction, drainage chamber changed. NSR on tele. Normotensive. Afebrile. Voiding per urinal/bathroom. Tolerating diet. Denies pain.

## 2023-08-24 NOTE — IMAGING NOTE
53 yo M necrotizing PNA w/ L effusion s/p pigtail cath. Output in range of 20 ml daily. Last CXR 8/21; new order for CXR today placed. Agree with pulm, may need chest CT for better evaluation prior to removal of tube.

## 2023-08-24 NOTE — PLAN OF CARE
Assumed care of pt at approximately 1930. Received pt resting in chair, on vapotherm. Pt A&Ox4. Chest tube x1 to -20 suction, see I&O. Up to bathroom. Switched to AVAPS overnight, O2 sats improved. Sitting in chair at end of shift. Afebrile. NSR on monitor. BP stable. See flowsheet.

## 2023-08-24 NOTE — PHYSICAL THERAPY NOTE
PHYSICAL THERAPY EVALUATION - INPATIENT     Room Number: 451/451-A  Evaluation Date: 8/24/2023  Type of Evaluation: Initial  Physician Order: PT Eval and Treat    Presenting Problem: necrotizing PNA  Co-Morbidities : DM, hx COVID in 2020  Reason for Therapy: Mobility Dysfunction and Discharge Planning    History related to current admission: Patient is a 52year old male admitted on 8/14/2023: Presents with dyspnea dx necrotizing PNA, s/p L CT placement 8/20, transferred to ICU 8/22 for inc O2 needs    ASSESSMENT   In this PT evaluation, the patient presents with the following impairments 1) Decreased activity tolerance and endurance, 2) Mild dyspnea reported with activity, pt would desat into upper 80s with activity however able to recover quickly with rest. 3) Able to ambulate with SBA. These impairments and comorbidities manifest themselves as functional limitations in independent bed mobility, transfers, and gait. The patient is below baseline and would benefit from skilled inpatient PT to address the above deficits to assist patient in returning to prior to level of function. Functional outcome measures completed include AMPAC. The AM-PAC '6-Clicks' Inpatient Basic Mobility Short Form was completed and this patient is demonstrating a Approx Degree of Impairment: 35.83%  degree of impairment in mobility. Research supports that patients with this level of impairment may benefit from HHPT/OT. DISCHARGE RECOMMENDATIONS  PT Discharge Recommendations: Home with home health PT/OT    PLAN  PT Treatment Plan: Bed mobility; Body mechanics; Coordination; Endurance; Energy conservation;Patient education; Family education;Gait training;Neuromuscular re-educate;Range of motion;Strengthening;Stoop training;Stair training;Transfer training;Balance training  Rehab Potential : Good  Frequency (Obs): 3-5x/week  Number of Visits to Meet Established Goals: 5      CURRENT GOALS    Goal #1 Patient is able to demonstrate supine - sit EOB @ level: supervision     Goal #2 Patient is able to demonstrate transfers EOB to/from Chair/Wheelchair at assistance level: supervision     Goal #3 Patient is able to ambulate 50 feet with assist device:  LRAD  at assistance level: supervision     Goal #4 Patient able to perform standing marching to mimic stair navigation with SBA   Goal #5    Goal #6    Goal Comments: Goals established on 8/24/2023    HOME SITUATION  Type of Home: [facility] [address]: One level  Stairs to Enter :  (3rd floor no elevator)             Lives With: Spouse (spouse is CNA)  Drives: Yes  Patient Owned Equipment: None  Patient Regularly Uses: Reading glasses    Prior Level of [place]: Indep, no hx of falls,  works as a salesman in [address]  \"I feel like I'm getting stronger\"       OBJECTIVE  Precautions: Chest tube  Fall Risk: Standard fall risk    WEIGHT BEARING RESTRICTION  Weight Bearing Restriction: None                PAIN ASSESSMENT  Rating: Unable to rate  Location: CT site  Management Techniques: Repositioning    COGNITION  Overall Cognitive Status:  WFL - within functional limits    RANGE OF MOTION AND STRENGTH ASSESSMENT  Upper extremity ROM and strength are within functional limits     Lower extremity ROM is within functional limits     Lower extremity strength is within functional limits       BALANCE  Static Sitting: Good  Dynamic Sitting: Good  Static Standing: Good  Dynamic Standing: Good    ADDITIONAL TESTS                                    ACTIVITY TOLERANCE                         O2 WALK  Oxygen Therapy  SPO2% on Oxygen at Rest: 92  At rest oxygen flow (liters per minute):  (VT 40L, 90% FiO2)  SPO2% Ambulation on Oxygen: 87  Ambulation oxygen flow (liters per minute):  (VT 40L, 90% FiO2)    NEUROLOGICAL FINDINGS                        AM-PAC '6-Clicks' INPATIENT SHORT FORM - BASIC MOBILITY  How much difficulty does the patient currently have. ..   Patient Difficulty: Turning over in bed (including adjusting bedclothes, sheets and blankets)?: None   Patient Difficulty: Sitting down on and standing up from a chair with arms (e.g., wheelchair, bedside commode, etc.): A Little   Patient Difficulty: Moving from lying on back to sitting on the side of the bed?: None   How much help from another person does the patient currently need. .. Help from Another: Moving to and from a bed to a chair (including a wheelchair)?: A Little   Help from Another: Need to walk in hospital room?: A Little   Help from Another: Climbing 3-5 steps with a railing?: A Little       AM-PAC Score:  Raw Score: 20   Approx Degree of Impairment: 35.83%   Standardized Score (AM-PAC Scale): 47.67   CMS Modifier (G-Code): CJ    FUNCTIONAL ABILITY STATUS  Gait Assessment   Functional Mobility/Gait Assessment  Gait Assistance: Contact guard assist  Distance (ft): 15,15  Assistive Device: None  Pattern: Within Functional Limits    Skilled Therapy Provided      Transfer Mobility:  Sit to stand: SBA   Stand to sit: SBA  Gait = CGA    Therapist's Comments:  pt able to perform ambulate in room limited by length of VT cord 15ft and then seated rest break between with vc for posture and pursed lip breathing - recovered to 92% - then able to ambulate another 15ft. Exercise/Education Provided: Body mechanics  Energy conservation  Gait training  Posture    Patient End of Session: Up in chair;Needs met;Call light within reach;RN aware of session/findings; All patient questions and concerns addressed      Patient Evaluation Complexity Level:  History Moderate - 1 or 2 personal factors and/or co-morbidities   Examination of body systems Moderate - addressing a total of 3 or more elements   Clinical Presentation Moderate - Evolving   Clinical Decision Making Moderate - Evolving       PT Session Time: 25 minutes  Gait Training: 10 minutes  Therapeutic Activity:  minutes  Neuromuscular Re-education:  minutes  Therapeutic Exercise: minutes

## 2023-08-25 ENCOUNTER — APPOINTMENT (OUTPATIENT)
Dept: GENERAL RADIOLOGY | Facility: HOSPITAL | Age: 53
End: 2023-08-25
Attending: INTERNAL MEDICINE
Payer: COMMERCIAL

## 2023-08-25 LAB
ANION GAP SERPL CALC-SCNC: 8 MMOL/L (ref 0–18)
BASOPHILS # BLD AUTO: 0.07 X10(3) UL (ref 0–0.2)
BASOPHILS NFR BLD AUTO: 0.4 %
BUN BLD-MCNC: 87 MG/DL (ref 7–18)
CALCIUM BLD-MCNC: 8.6 MG/DL (ref 8.5–10.1)
CHLORIDE SERPL-SCNC: 91 MMOL/L (ref 98–112)
CO2 SERPL-SCNC: 34 MMOL/L (ref 21–32)
CREAT BLD-MCNC: 2.48 MG/DL
EGFRCR SERPLBLD CKD-EPI 2021: 30 ML/MIN/1.73M2 (ref 60–?)
EOSINOPHIL # BLD AUTO: 0 X10(3) UL (ref 0–0.7)
EOSINOPHIL NFR BLD AUTO: 0 %
ERYTHROCYTE [DISTWIDTH] IN BLOOD BY AUTOMATED COUNT: 13 %
GLUCOSE BLD-MCNC: 170 MG/DL (ref 70–99)
GLUCOSE BLD-MCNC: 175 MG/DL (ref 70–99)
GLUCOSE BLD-MCNC: 177 MG/DL (ref 70–99)
GLUCOSE BLD-MCNC: 189 MG/DL (ref 70–99)
GLUCOSE BLD-MCNC: 194 MG/DL (ref 70–99)
GLUCOSE BLD-MCNC: 86 MG/DL (ref 70–99)
HCT VFR BLD AUTO: 37.2 %
HGB BLD-MCNC: 11.8 G/DL
IMM GRANULOCYTES # BLD AUTO: 0.33 X10(3) UL (ref 0–1)
IMM GRANULOCYTES NFR BLD: 1.7 %
LYMPHOCYTES # BLD AUTO: 1.6 X10(3) UL (ref 1–4)
LYMPHOCYTES NFR BLD AUTO: 8.3 %
MAGNESIUM SERPL-MCNC: 2.8 MG/DL (ref 1.6–2.6)
MCH RBC QN AUTO: 27.5 PG (ref 26–34)
MCHC RBC AUTO-ENTMCNC: 31.7 G/DL (ref 31–37)
MCV RBC AUTO: 86.7 FL
MONOCYTES # BLD AUTO: 2.27 X10(3) UL (ref 0.1–1)
MONOCYTES NFR BLD AUTO: 11.8 %
NEUTROPHILS # BLD AUTO: 14.98 X10 (3) UL (ref 1.5–7.7)
NEUTROPHILS # BLD AUTO: 14.98 X10(3) UL (ref 1.5–7.7)
NEUTROPHILS NFR BLD AUTO: 77.8 %
OSMOLALITY SERPL CALC.SUM OF ELEC: 307 MOSM/KG (ref 275–295)
PLATELET # BLD AUTO: 550 10(3)UL (ref 150–450)
POTASSIUM SERPL-SCNC: 3.9 MMOL/L (ref 3.5–5.1)
RBC # BLD AUTO: 4.29 X10(6)UL
SODIUM SERPL-SCNC: 133 MMOL/L (ref 136–145)
WBC # BLD AUTO: 19.3 X10(3) UL (ref 4–11)

## 2023-08-25 PROCEDURE — 99232 SBSQ HOSP IP/OBS MODERATE 35: CPT | Performed by: INTERNAL MEDICINE

## 2023-08-25 PROCEDURE — 71045 X-RAY EXAM CHEST 1 VIEW: CPT | Performed by: INTERNAL MEDICINE

## 2023-08-25 PROCEDURE — 99291 CRITICAL CARE FIRST HOUR: CPT | Performed by: INTERNAL MEDICINE

## 2023-08-25 PROCEDURE — 99233 SBSQ HOSP IP/OBS HIGH 50: CPT | Performed by: INTERNAL MEDICINE

## 2023-08-25 NOTE — PLAN OF CARE
Assumed care of pt after RN report. Alert and oriented. Vap 40L/100%, saturations above 90%. NSR/ST. BP stable. BLE edema causing discomfort, pt in chair, educated on elevating legs, refusing. CT removed by critical care APN. See flowsheets for full assessments. POC discussed with pt and daughters via phone.

## 2023-08-25 NOTE — PROGRESS NOTES
Patient's pigtail catheter chest tube removed by this writer per Dr. Balwinder Bronson. No difficulty or complication in discontinuing. Dressing applied. No plan for CXR unless increasing O2 requirements or hypoxia.     MARI Chandra-BC  ICU  Phone  [phone]   Pager [phone]

## 2023-08-26 PROBLEM — R60.1 ANASARCA: Status: ACTIVE | Noted: 2023-08-26

## 2023-08-26 LAB
ANION GAP SERPL CALC-SCNC: 5 MMOL/L (ref 0–18)
BUN BLD-MCNC: 91 MG/DL (ref 7–18)
CALCIUM BLD-MCNC: 8.8 MG/DL (ref 8.5–10.1)
CHLORIDE SERPL-SCNC: 94 MMOL/L (ref 98–112)
CO2 SERPL-SCNC: 37 MMOL/L (ref 21–32)
CREAT BLD-MCNC: 2.27 MG/DL
EGFRCR SERPLBLD CKD-EPI 2021: 33 ML/MIN/1.73M2 (ref 60–?)
ERYTHROCYTE [DISTWIDTH] IN BLOOD BY AUTOMATED COUNT: 12.7 %
GLUCOSE BLD-MCNC: 173 MG/DL (ref 70–99)
GLUCOSE BLD-MCNC: 176 MG/DL (ref 70–99)
GLUCOSE BLD-MCNC: 216 MG/DL (ref 70–99)
GLUCOSE BLD-MCNC: 221 MG/DL (ref 70–99)
GLUCOSE BLD-MCNC: 266 MG/DL (ref 70–99)
HCT VFR BLD AUTO: 42.1 %
HGB BLD-MCNC: 13 G/DL
MAGNESIUM SERPL-MCNC: 3.1 MG/DL (ref 1.6–2.6)
MCH RBC QN AUTO: 27.3 PG (ref 26–34)
MCHC RBC AUTO-ENTMCNC: 30.9 G/DL (ref 31–37)
MCV RBC AUTO: 88.3 FL
OSMOLALITY SERPL CALC.SUM OF ELEC: 314 MOSM/KG (ref 275–295)
PLATELET # BLD AUTO: 493 10(3)UL (ref 150–450)
POTASSIUM SERPL-SCNC: 4.5 MMOL/L (ref 3.5–5.1)
RBC # BLD AUTO: 4.77 X10(6)UL
SODIUM SERPL-SCNC: 136 MMOL/L (ref 136–145)
WBC # BLD AUTO: 17.1 X10(3) UL (ref 4–11)

## 2023-08-26 PROCEDURE — 99291 CRITICAL CARE FIRST HOUR: CPT | Performed by: INTERNAL MEDICINE

## 2023-08-26 PROCEDURE — 99232 SBSQ HOSP IP/OBS MODERATE 35: CPT | Performed by: INTERNAL MEDICINE

## 2023-08-26 NOTE — PLAN OF CARE
Assumed care of pt after shift report. Alert and oriented x4. Afebrile. NSR per monitor. Normotensive. Received on Vapotherm 40L/100%, weaned to 38L/70%. Productive cough. Voiding per urinal. Up in chair for entire shift. Pt updated and agreeable to plan of care. See MAR and flowsheets for additional information.

## 2023-08-26 NOTE — PLAN OF CARE
On vapotherm 40L 100% saturating 90-96%. Coughing up thick tan/brown mucus throughout the night. Pt c/o \"itchiness\" in throat and lungs. Administered cough meds and cepacol lozenges. Urinated over 3L in 12 hours. Afebrile throughout the night.         Problem: RESPIRATORY - ADULT  Goal: Achieves optimal ventilation and oxygenation  Description: INTERVENTIONS:  - Assess for changes in respiratory status  - Assess for changes in mentation and behavior  - Position to facilitate oxygenation and minimize respiratory effort  - Oxygen supplementation based on oxygen saturation or ABGs  - Provide Smoking Cessation handout, if applicable  - Encourage broncho-pulmonary hygiene including cough, deep breathe, Incentive Spirometry  - Assess the need for suctioning and perform as needed  - Assess and instruct to report SOB or any respiratory difficulty  - Respiratory Therapy support as indicated  - Manage/alleviate anxiety  - Monitor for signs/symptoms of CO2 retention  Outcome: Not Progressing     Problem: METABOLIC/FLUID AND ELECTROLYTES - ADULT  Goal: Glucose maintained within prescribed range  Description: INTERVENTIONS:  - Monitor Blood Glucose as ordered  - Assess for signs and symptoms of hyperglycemia and hypoglycemia  - Administer ordered medications to maintain glucose within target range  - Assess barriers to adequate nutritional intake and initiate nutrition consult as needed  - Instruct patient on self management of diabetes  Outcome: Progressing  Goal: Electrolytes maintained within normal limits  Description: INTERVENTIONS:  - Monitor labs and rhythm and assess patient for signs and symptoms of electrolyte imbalances  - Administer electrolyte replacement as ordered  - Monitor response to electrolyte replacements, including rhythm and repeat lab results as appropriate  - Fluid restriction as ordered  - Instruct patient on fluid and nutrition restrictions as appropriate  Outcome: Progressing  Goal: Hemodynamic stability and optimal renal function maintained  Description: INTERVENTIONS:  - Monitor labs and assess for signs and symptoms of volume excess or deficit  - Monitor intake, output and patient weight  - Monitor urine specific gravity, serum osmolarity and serum sodium as indicated or ordered  - Monitor response to interventions for patient's volume status, including labs, urine output, blood pressure (other measures as available)  - Encourage oral intake as appropriate  - Instruct patient on fluid and nutrition restrictions as appropriate  Outcome: Progressing

## 2023-08-27 LAB
ALBUMIN SERPL-MCNC: 1.8 G/DL (ref 3.4–5)
ALBUMIN/GLOB SERPL: 0.4 {RATIO} (ref 1–2)
ALP LIVER SERPL-CCNC: 118 U/L
ALT SERPL-CCNC: 27 U/L
ANION GAP SERPL CALC-SCNC: 4 MMOL/L (ref 0–18)
AST SERPL-CCNC: 8 U/L (ref 15–37)
BILIRUB SERPL-MCNC: 0.4 MG/DL (ref 0.1–2)
BUN BLD-MCNC: 81 MG/DL (ref 7–18)
CALCIUM BLD-MCNC: 8.7 MG/DL (ref 8.5–10.1)
CHLORIDE SERPL-SCNC: 99 MMOL/L (ref 98–112)
CO2 SERPL-SCNC: 36 MMOL/L (ref 21–32)
CREAT BLD-MCNC: 1.54 MG/DL
EGFRCR SERPLBLD CKD-EPI 2021: 53 ML/MIN/1.73M2 (ref 60–?)
ERYTHROCYTE [DISTWIDTH] IN BLOOD BY AUTOMATED COUNT: 12.6 %
GLOBULIN PLAS-MCNC: 4.3 G/DL (ref 2.8–4.4)
GLUCOSE BLD-MCNC: 179 MG/DL (ref 70–99)
GLUCOSE BLD-MCNC: 198 MG/DL (ref 70–99)
GLUCOSE BLD-MCNC: 222 MG/DL (ref 70–99)
GLUCOSE BLD-MCNC: 271 MG/DL (ref 70–99)
GLUCOSE BLD-MCNC: 286 MG/DL (ref 70–99)
HCT VFR BLD AUTO: 42 %
HGB BLD-MCNC: 12.7 G/DL
MCH RBC QN AUTO: 27.3 PG (ref 26–34)
MCHC RBC AUTO-ENTMCNC: 30.2 G/DL (ref 31–37)
MCV RBC AUTO: 90.3 FL
OSMOLALITY SERPL CALC.SUM OF ELEC: 322 MOSM/KG (ref 275–295)
PLATELET # BLD AUTO: 517 10(3)UL (ref 150–450)
POTASSIUM SERPL-SCNC: 4.6 MMOL/L (ref 3.5–5.1)
PROT SERPL-MCNC: 6.1 G/DL (ref 6.4–8.2)
RBC # BLD AUTO: 4.65 X10(6)UL
SODIUM SERPL-SCNC: 139 MMOL/L (ref 136–145)
WBC # BLD AUTO: 14.2 X10(3) UL (ref 4–11)

## 2023-08-27 PROCEDURE — 99232 SBSQ HOSP IP/OBS MODERATE 35: CPT | Performed by: INTERNAL MEDICINE

## 2023-08-27 PROCEDURE — 99291 CRITICAL CARE FIRST HOUR: CPT | Performed by: INTERNAL MEDICINE

## 2023-08-27 NOTE — PLAN OF CARE
Assumed care of pt after shift report. Alert and oriented x4. Afebrile. NSR per monitor. Normotensive. Received on Vapotherm 35L/50%, weaned to 30L/60%. Productive cough. BM x1. Voiding per urinal. Up in chair for entire shift. Ambulated in room x2. Pt updated and agreeable to plan of care. See MAR and flowsheets for additional information.

## 2023-08-27 NOTE — PLAN OF CARE
No significant events over the night. Actively weaning FiO2 as tolerated.       Problem: RESPIRATORY - ADULT  Goal: Achieves optimal ventilation and oxygenation  Description: INTERVENTIONS:  - Assess for changes in respiratory status  - Assess for changes in mentation and behavior  - Position to facilitate oxygenation and minimize respiratory effort  - Oxygen supplementation based on oxygen saturation or ABGs  - Provide Smoking Cessation handout, if applicable  - Encourage broncho-pulmonary hygiene including cough, deep breathe, Incentive Spirometry  - Assess the need for suctioning and perform as needed  - Assess and instruct to report SOB or any respiratory difficulty  - Respiratory Therapy support as indicated  - Manage/alleviate anxiety  - Monitor for signs/symptoms of CO2 retention  Outcome: Progressing     Problem: METABOLIC/FLUID AND ELECTROLYTES - ADULT  Goal: Glucose maintained within prescribed range  Description: INTERVENTIONS:  - Monitor Blood Glucose as ordered  - Assess for signs and symptoms of hyperglycemia and hypoglycemia  - Administer ordered medications to maintain glucose within target range  - Assess barriers to adequate nutritional intake and initiate nutrition consult as needed  - Instruct patient on self management of diabetes  Outcome: Progressing  Goal: Electrolytes maintained within normal limits  Description: INTERVENTIONS:  - Monitor labs and rhythm and assess patient for signs and symptoms of electrolyte imbalances  - Administer electrolyte replacement as ordered  - Monitor response to electrolyte replacements, including rhythm and repeat lab results as appropriate  - Fluid restriction as ordered  - Instruct patient on fluid and nutrition restrictions as appropriate  Outcome: Progressing  Goal: Hemodynamic stability and optimal renal function maintained  Description: INTERVENTIONS:  - Monitor labs and assess for signs and symptoms of volume excess or deficit  - Monitor intake, output and patient weight  - Monitor urine specific gravity, serum osmolarity and serum sodium as indicated or ordered  - Monitor response to interventions for patient's volume status, including labs, urine output, blood pressure (other measures as available)  - Encourage oral intake as appropriate  - Instruct patient on fluid and nutrition restrictions as appropriate  Outcome: Progressing

## 2023-08-28 LAB
ALBUMIN SERPL-MCNC: 1.8 G/DL (ref 3.4–5)
ALBUMIN/GLOB SERPL: 0.5 {RATIO} (ref 1–2)
ALP LIVER SERPL-CCNC: 103 U/L
ALT SERPL-CCNC: 23 U/L
ANION GAP SERPL CALC-SCNC: 3 MMOL/L (ref 0–18)
AST SERPL-CCNC: 8 U/L (ref 15–37)
BILIRUB SERPL-MCNC: 0.3 MG/DL (ref 0.1–2)
BUN BLD-MCNC: 59 MG/DL (ref 7–18)
CALCIUM BLD-MCNC: 8.8 MG/DL (ref 8.5–10.1)
CHLORIDE SERPL-SCNC: 99 MMOL/L (ref 98–112)
CO2 SERPL-SCNC: 35 MMOL/L (ref 21–32)
CREAT BLD-MCNC: 1.11 MG/DL
EGFRCR SERPLBLD CKD-EPI 2021: 78 ML/MIN/1.73M2 (ref 60–?)
ERYTHROCYTE [DISTWIDTH] IN BLOOD BY AUTOMATED COUNT: 12.6 %
GLOBULIN PLAS-MCNC: 4 G/DL (ref 2.8–4.4)
GLUCOSE BLD-MCNC: 130 MG/DL (ref 70–99)
GLUCOSE BLD-MCNC: 176 MG/DL (ref 70–99)
GLUCOSE BLD-MCNC: 184 MG/DL (ref 70–99)
GLUCOSE BLD-MCNC: 191 MG/DL (ref 70–99)
GLUCOSE BLD-MCNC: 244 MG/DL (ref 70–99)
HCT VFR BLD AUTO: 41.1 %
HGB BLD-MCNC: 12.8 G/DL
MCH RBC QN AUTO: 27.2 PG (ref 26–34)
MCHC RBC AUTO-ENTMCNC: 31.1 G/DL (ref 31–37)
MCV RBC AUTO: 87.4 FL
OSMOLALITY SERPL CALC.SUM OF ELEC: 306 MOSM/KG (ref 275–295)
PLATELET # BLD AUTO: 578 10(3)UL (ref 150–450)
POTASSIUM SERPL-SCNC: 4.3 MMOL/L (ref 3.5–5.1)
PROT SERPL-MCNC: 5.8 G/DL (ref 6.4–8.2)
RBC # BLD AUTO: 4.7 X10(6)UL
SODIUM SERPL-SCNC: 137 MMOL/L (ref 136–145)
WBC # BLD AUTO: 14.3 X10(3) UL (ref 4–11)

## 2023-08-28 PROCEDURE — 99291 CRITICAL CARE FIRST HOUR: CPT | Performed by: INTERNAL MEDICINE

## 2023-08-28 PROCEDURE — 99232 SBSQ HOSP IP/OBS MODERATE 35: CPT | Performed by: INTERNAL MEDICINE

## 2023-08-28 RX ORDER — BUMETANIDE 0.25 MG/ML
2 INJECTION INTRAMUSCULAR; INTRAVENOUS
Status: DISCONTINUED | OUTPATIENT
Start: 2023-08-28 | End: 2023-08-29 | Stop reason: SDUPTHER

## 2023-08-28 RX ORDER — PREDNISONE 20 MG/1
60 TABLET ORAL EVERY 24 HOURS
Status: DISCONTINUED | OUTPATIENT
Start: 2023-08-28 | End: 2023-08-31

## 2023-08-28 NOTE — CM/SW NOTE
Care Progression Note:  Length of stay: 14  GMLOS: 5.2  Avoidable Delays:   Code Status: Full Code    Acute Medical Issue/Factors:   Necrotizing pneumonia ([address] Receiving IV Voriconazole. Plan for 10-14 days, started 8/25. Weaning oxygen as tolerated. Pt currently on Vapotherm. 30L/60%. Receiving IV Bumex, plan for the next or so per Nephrology. Discharge Barriers: Clinical improvement. Expected discharge date: TBD  Expected next site of care: Home, any additional services to be determined. .     / available for discharge planning.      CELESTE Quach, [facility]    [id number]

## 2023-08-28 NOTE — PLAN OF CARE
Alert and oriented, denies pain, nausea, states mild dyspnea with exertion only (not noted), states only discomfort is throat itchiness that is relieved with Cepacol lozenge. Lungs clear except for crackles to posterior left base, vapotherm 30/0.6, spontaneous strong cough with thick tan sputum production. 2+ lower extremity edema and mild murmur noted. Skin intact, note left posterior upper back foam tape (dressing over previous chest tube site). Active bowel sounds, urinary frequency noted with clear yellow odor free urine. SCD's reapplied when back into bed. Sat up in chair from around 5 am (per report) until 9 pm tonight. Frequently stood up and marched in place. Will try to down-titrate O2 as able. See assessments and flowsheets for further data.

## 2023-08-28 NOTE — PHYSICAL THERAPY NOTE
PHYSICAL THERAPY TREATMENT NOTE - INPATIENT    Room Number: 451/451-A     Session: 1     Number of Visits to Meet Established Goals: 5    Presenting Problem: necrotizing PNA  Co-Morbidities : DM, hx COVID in   ASSESSMENT     Pt demonstrating improved activity tolerance and endurance this session able to ambulate 60ft with multiple standing rest breaks x6 as sats would drop to 87% and then return to 90% with breaks. Educated on LE exercises to assist with edema management as noted below. Performed 30 sec STS assessment with pt scoring below average norms for age range. The patient is below baseline and would benefit from skilled inpatient PT to address the above deficits to assist patient in returning to prior to level of function. DISCHARGE RECOMMENDATIONS  PT Discharge Recommendations: Home with home health PT/OT     PLAN  PT Treatment Plan: Bed mobility; Body mechanics; Coordination; Endurance; Energy conservation;Patient education; Family education;Gait training;Neuromuscular re-educate;Range of motion;Strengthening;Stoop training;Stair training;Transfer training;Balance training  Rehab Potential : Good  Frequency (Obs): 3-5x/week    CURRENT GOALS     Goal #1 Patient is able to demonstrate supine - sit EOB @ level: supervision      Goal #2 Patient is able to demonstrate transfers EOB to/from Chair/Wheelchair at assistance level: supervision      Goal #3 Patient is able to ambulate 50 feet with assist device:  LRAD  at assistance level: supervision      Goal #4 Patient able to perform stairs with rail and SBA   Goal #5     Goal #6     Goal Comments: Goals established on 2023 all goals ongoing    SUBJECTIVE  \" I just need to get rid of this cord\" - referring to VT    OBJECTIVE  Precautions: Chest tube    WEIGHT BEARING RESTRICTION  Weight Bearing Restriction: None                PAIN ASSESSMENT   Ratin  Location: CT site  Management Techniques: Repositioning    BALANCE Static Sitting: Good  Dynamic Sitting: Good           Static Standing: Good  Dynamic Standing: Good    ACTIVITY TOLERANCE                         O2 WALK  Oxygen Therapy  SPO2% Ambulation on Oxygen: 87 (estevan 5)  Ambulation oxygen flow (liters per minute):  (30L/60% VT)      AM-PAC '6-Clicks' INPATIENT SHORT FORM - BASIC MOBILITY  How much difficulty does the patient currently have. .. Patient Difficulty: Turning over in bed (including adjusting bedclothes, sheets and blankets)?: None   Patient Difficulty: Sitting down on and standing up from a chair with arms (e.g., wheelchair, bedside commode, etc.): None   Patient Difficulty: Moving from lying on back to sitting on the side of the bed?: A Little   How much help from another person does the patient currently need. .. Help from Another: Moving to and from a bed to a chair (including a wheelchair)?: A Little   Help from Another: Need to walk in hospital room?: A Little   Help from Another: Climbing 3-5 steps with a railing?: A Little       AM-PAC Score:  Raw Score: 20   Approx Degree of Impairment: 35.83%   Standardized Score (AM-PAC Scale): 47.67   CMS Modifier (G-Code): CJ    FUNCTIONAL ABILITY STATUS  Gait Assessment   Functional Mobility/Gait Assessment  Gait Assistance: Contact guard assist  Distance (ft): 60  Assistive Device: None  Pattern: Within Functional Limits    Skilled Therapy Provided       Transfer Mobility:  Sit<>Stand: SBA   Stand<>Sit: SBA   Gait: SBA    Therapist's Comments: gait trained without device with standing rest break x6 during session, vc for pursed lip breathing with good recovery during standing rest breaks until final break after 60ft, cued to return to bedside chair, recovered to 90 within 30 seconds of returning to seated.  Performed 30 sec STS assessment as noted below with sats increasing to 93% during test.     Functional Mobility:  30 sec sit<>stand: 5   Fall Risk: yes  [Below average score indicates high risk for falls; norms by age > or = to. ..   63-63 y/o Men: 15, Women: 16   78-79 y/o Men: 15, Women: 6   76-68 y/o Men: 15, Women: 8   71-74 y/o Men: 6, Women: 8   80-81 y/o Men: 8, Women: 5   80-81 y/o Men: 6, Women: 8   80-81 y/o Men 7, Women: 4]      THERAPEUTIC EXERCISES  Lower Extremity Alternating marching  Ankle pumps  LAQ     Upper Extremity      Position      Repetitions   15   Sets   1     Patient End of Session: Up in chair;Needs met;Call light within reach;RN aware of session/findings; All patient questions and concerns addressed    PT Session Time: 25 minutes  Gait Training: 10 minutes  Therapeutic Activity: 15 minutes  Therapeutic Exercise:  minutes   Neuromuscular Re-education:  minutes

## 2023-08-28 NOTE — PLAN OF CARE
Assumed care of patient after RN report. Patient alert and oriented x4. On Vapotherm 30L/60%. Thick tan secretions with spontaneous cough. Throat soreness, lozenges PRN. RAMOS. SR on monitor. Up to bathroom- 1 BM and urinating clear yellow urine. Denies pain. Family updated on POC. See flowsheet for full assessment.

## 2023-08-29 PROBLEM — E87.70 HYPERVOLEMIA: Status: ACTIVE | Noted: 2023-08-29

## 2023-08-29 PROBLEM — E87.3 ALKALOSIS: Status: ACTIVE | Noted: 2023-08-29

## 2023-08-29 LAB
ALBUMIN SERPL-MCNC: 1.8 G/DL (ref 3.4–5)
ALBUMIN/GLOB SERPL: 0.5 {RATIO} (ref 1–2)
ALP LIVER SERPL-CCNC: 88 U/L
ALT SERPL-CCNC: 24 U/L
ANION GAP SERPL CALC-SCNC: 3 MMOL/L (ref 0–18)
AST SERPL-CCNC: 8 U/L (ref 15–37)
BILIRUB SERPL-MCNC: 0.4 MG/DL (ref 0.1–2)
BUN BLD-MCNC: 53 MG/DL (ref 7–18)
CALCIUM BLD-MCNC: 8.4 MG/DL (ref 8.5–10.1)
CHLORIDE SERPL-SCNC: 99 MMOL/L (ref 98–112)
CO2 SERPL-SCNC: 37 MMOL/L (ref 21–32)
CREAT BLD-MCNC: 1.1 MG/DL
EGFRCR SERPLBLD CKD-EPI 2021: 79 ML/MIN/1.73M2 (ref 60–?)
ERYTHROCYTE [DISTWIDTH] IN BLOOD BY AUTOMATED COUNT: 12.6 %
GLOBULIN PLAS-MCNC: 3.6 G/DL (ref 2.8–4.4)
GLUCOSE BLD-MCNC: 146 MG/DL (ref 70–99)
GLUCOSE BLD-MCNC: 149 MG/DL (ref 70–99)
GLUCOSE BLD-MCNC: 163 MG/DL (ref 70–99)
GLUCOSE BLD-MCNC: 167 MG/DL (ref 70–99)
GLUCOSE BLD-MCNC: 186 MG/DL (ref 70–99)
GLUCOSE BLD-MCNC: 93 MG/DL (ref 70–99)
HCT VFR BLD AUTO: 39.6 %
HGB BLD-MCNC: 12.5 G/DL
MAGNESIUM SERPL-MCNC: 1.9 MG/DL (ref 1.6–2.6)
MCH RBC QN AUTO: 27.5 PG (ref 26–34)
MCHC RBC AUTO-ENTMCNC: 31.6 G/DL (ref 31–37)
MCV RBC AUTO: 87 FL
OSMOLALITY SERPL CALC.SUM OF ELEC: 306 MOSM/KG (ref 275–295)
PHOSPHATE SERPL-MCNC: 5 MG/DL (ref 2.5–4.9)
PLATELET # BLD AUTO: 537 10(3)UL (ref 150–450)
POTASSIUM SERPL-SCNC: 4.3 MMOL/L (ref 3.5–5.1)
PROT SERPL-MCNC: 5.4 G/DL (ref 6.4–8.2)
RBC # BLD AUTO: 4.55 X10(6)UL
SODIUM SERPL-SCNC: 139 MMOL/L (ref 136–145)
WBC # BLD AUTO: 13.7 X10(3) UL (ref 4–11)

## 2023-08-29 PROCEDURE — 99232 SBSQ HOSP IP/OBS MODERATE 35: CPT | Performed by: INTERNAL MEDICINE

## 2023-08-29 PROCEDURE — 99291 CRITICAL CARE FIRST HOUR: CPT | Performed by: INTERNAL MEDICINE

## 2023-08-29 RX ORDER — BUMETANIDE 0.25 MG/ML
2 INJECTION INTRAMUSCULAR; INTRAVENOUS
Status: COMPLETED | OUTPATIENT
Start: 2023-08-29 | End: 2023-08-30

## 2023-08-29 NOTE — PROGRESS NOTES
Critical Care Progress Note        NAME: Susi Griggs - ROOM: [address] [place] [address] - MRN: [id number] - Age: 52year old - : 1970  Date of Admission: 2023  8:07 AM  Admission Diagnosis: Necrotizing pneumonia ([address] [J85.0]  Hypoxia [R09.02]      Last 24hrs: No events overnight, still w/ productive cough, feels the same as yesterday    OBJECTIVE:   23  0500 23  0545 23  0600 23  0700   BP: 155/63  (!) 167/63 123/64   BP Location:       Pulse: 89  88 98   Resp:    Temp:       TempSrc:       SpO2: 92%  92% (!) 83%   Weight:  288 lb 9.3 oz (130.9 kg)     Height:           Oxygen Therapy  SpO2: (!) 83 %  O2 Device: High flow/High humidity  FiO2 (%): 60 %  O2 Flow Rate (L/min): 30 L/min  Pulse Oximetry Type: Continuous  Oximetry Probe Site Changed: No  Pulse Ox Probe Location: Left hand                Ventilator Settings:  FiO2 (%): 60 %         Wt Readings from Last 3 Encounters:  23 : 288 lb 9.3 oz (130.9 kg)  23 : 293 lb 3.4 oz (133 kg)  23 : 295 lb (133.8 kg)        Intake/Output Summary (Last 24 hours) at 2023 0706  Last data filed at 2023 0545  Gross per 24 hour   Intake 1140 ml   Output 4250 ml   Net -3110 ml         Scheduled Medication:   predniSONE  60 mg Oral Q24H    bumetanide  2 mg Intravenous BID (Diuretic)    insulin detemir  24 Units Subcutaneous BID    voriconazole  4 mg/kg (Ideal) Intravenous Q12H    insulin aspart  1-30 Units Subcutaneous TID CC and HS    ipratropium-albuterol  3 mL Nebulization BID    apixaban  5 mg Oral BID    dilTIAZem ER  120 mg Oral Daily    insulin aspart  1-68 Units Subcutaneous TID CC     Continuous Infusing Medication:      PRN Medication:sodium chloride, acetaminophen, HYDROcodone-homatropine, benzocaine-menthol, glucose **OR** glucose **OR** glucose-vitamin C **OR** dextrose **OR** glucose **OR** glucose **OR** glucose-vitamin C, acetaminophen, melatonin, guaiFENesin ER, benzonatate, ondansetron, prochlorperazine, polyethylene glycol (PEG 3350), sennosides, bisacodyl, fleet enema, ipratropium-albuterol, ibuprofen     Lungs:  rales in left base, otherwise clear  Heart: S1, S2 normal, no murmur, click, rub or gallop, regular rate and rhythm  Abdomen: soft, non-tender; bowel sounds normal; no masses,  no organomegaly  Extremities: edema 2+ in LE terrance      Labs reviewed as noted below        ASSESSMENT/PLAN:    Acute hypoxic respiratory failure - due to left sided necrotizing pneumonia and pleural effusion  - wean O2 as able, on vapotherm 30L/60% presently  - avaps prn  - encourage IS use  - ambulate as tolerated  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 Suspect LTBI  -per ID  Left pleural effusion - s/p IR chest tube placement 8/20. Exudative, culture negative. No further drainage so chest tube discontinued 8/25  -monitor for recurrence  ZORAIDA - improving  -monitor labs, urine output  -nephrology is following  A fib: new onset. Now NSR  - anticoagulated w/ eliquis  - diltiazem prn  DM2  -monitor accuchecks  -insulin per hospitalist  Nutrition   - reg diet  Proph  - cont eliquis  Dispo  - Full code  - ICU monitoring.  Pt is critically ill          Medically necessary and clinically appropriate Critical Care Time: 35 minutes                 [address] [facility]  Pulmonary and Critical Care  On call Intensivist 08/29/23

## 2023-08-29 NOTE — PLAN OF CARE
Assumed care of patient after shift report. Patient Aox4. Remains on Vapotherm 30 L/60% FiO2. Bloody nose x1 overnight.

## 2023-08-29 NOTE — PLAN OF CARE
Assumed care of patient after RN report. Patient alert and oriented x4. On Vapotherm 30L/60%. SR on monitor. Urinating well in urinals, clear yellow urine. 1 BM. Ambulating to bathroom. Lozenges PRN for throat soreness, tylenol for leg pain see MAR. See flowsheet for full assessment.

## 2023-08-30 PROBLEM — B40.9 BLASTOMYCOSIS: Status: ACTIVE | Noted: 2023-08-30

## 2023-08-30 PROBLEM — B39.2: Status: ACTIVE | Noted: 2023-08-24

## 2023-08-30 LAB
ALBUMIN SERPL-MCNC: 2 G/DL (ref 3.4–5)
ALBUMIN/GLOB SERPL: 0.6 {RATIO} (ref 1–2)
ALP LIVER SERPL-CCNC: 88 U/L
ALT SERPL-CCNC: 27 U/L
ANION GAP SERPL CALC-SCNC: 6 MMOL/L (ref 0–18)
AST SERPL-CCNC: 10 U/L (ref 15–37)
BILIRUB SERPL-MCNC: 0.4 MG/DL (ref 0.1–2)
BUN BLD-MCNC: 45 MG/DL (ref 7–18)
CALCIUM BLD-MCNC: 8.1 MG/DL (ref 8.5–10.1)
CHLORIDE SERPL-SCNC: 95 MMOL/L (ref 98–112)
CO2 SERPL-SCNC: 34 MMOL/L (ref 21–32)
CREAT BLD-MCNC: 1.15 MG/DL
EGFRCR SERPLBLD CKD-EPI 2021: 75 ML/MIN/1.73M2 (ref 60–?)
ERYTHROCYTE [DISTWIDTH] IN BLOOD BY AUTOMATED COUNT: 12.6 %
GLOBULIN PLAS-MCNC: 3.5 G/DL (ref 2.8–4.4)
GLUCOSE BLD-MCNC: 115 MG/DL (ref 70–99)
GLUCOSE BLD-MCNC: 133 MG/DL (ref 70–99)
GLUCOSE BLD-MCNC: 183 MG/DL (ref 70–99)
GLUCOSE BLD-MCNC: 220 MG/DL (ref 70–99)
GLUCOSE BLD-MCNC: 93 MG/DL (ref 70–99)
HCT VFR BLD AUTO: 40.1 %
HGB BLD-MCNC: 12.7 G/DL
MAGNESIUM SERPL-MCNC: 1.9 MG/DL (ref 1.6–2.6)
MCH RBC QN AUTO: 27.1 PG (ref 26–34)
MCHC RBC AUTO-ENTMCNC: 31.7 G/DL (ref 31–37)
MCV RBC AUTO: 85.7 FL
OSMOLALITY SERPL CALC.SUM OF ELEC: 298 MOSM/KG (ref 275–295)
PLATELET # BLD AUTO: 465 10(3)UL (ref 150–450)
POTASSIUM SERPL-SCNC: 4.3 MMOL/L (ref 3.5–5.1)
PROT SERPL-MCNC: 5.5 G/DL (ref 6.4–8.2)
RBC # BLD AUTO: 4.68 X10(6)UL
SODIUM SERPL-SCNC: 135 MMOL/L (ref 136–145)
WBC # BLD AUTO: 14 X10(3) UL (ref 4–11)

## 2023-08-30 PROCEDURE — 99291 CRITICAL CARE FIRST HOUR: CPT | Performed by: INTERNAL MEDICINE

## 2023-08-30 PROCEDURE — 99232 SBSQ HOSP IP/OBS MODERATE 35: CPT | Performed by: HOSPITALIST

## 2023-08-30 RX ORDER — BUMETANIDE 0.25 MG/ML
2 INJECTION INTRAMUSCULAR; INTRAVENOUS EVERY 12 HOURS SCHEDULED
Status: COMPLETED | OUTPATIENT
Start: 2023-08-30 | End: 2023-09-01

## 2023-08-30 RX ORDER — ALBUMIN (HUMAN) 12.5 G/50ML
25 SOLUTION INTRAVENOUS EVERY 12 HOURS
Status: DISCONTINUED | OUTPATIENT
Start: 2023-08-30 | End: 2023-08-30

## 2023-08-30 RX ORDER — ALBUMIN (HUMAN) 12.5 G/50ML
25 SOLUTION INTRAVENOUS
Status: DISCONTINUED | OUTPATIENT
Start: 2023-08-30 | End: 2023-08-30 | Stop reason: SDUPTHER

## 2023-08-30 RX ORDER — BUMETANIDE 0.25 MG/ML
2 INJECTION INTRAMUSCULAR; INTRAVENOUS
Status: DISCONTINUED | OUTPATIENT
Start: 2023-08-30 | End: 2023-08-30 | Stop reason: SDUPTHER

## 2023-08-30 RX ORDER — ALBUMIN (HUMAN) 12.5 G/50ML
25 SOLUTION INTRAVENOUS EVERY 12 HOURS
Status: COMPLETED | OUTPATIENT
Start: 2023-08-30 | End: 2023-09-01

## 2023-08-30 NOTE — PHYSICAL THERAPY NOTE
PHYSICAL THERAPY TREATMENT NOTE - INPATIENT    Room Number: 451/451-A     Session:      Number of Visits to Meet Established Goals: 4    Presenting Problem: necrotizing PNA  Co-Morbidities : DM, hx of COVID in     ASSESSMENT     Pt agreeable to therapy and very motivated to participate. Pt amb in room again today due to continued O2 needs of 15L. Pt is progressing toward goals set for rehab. Pt remains below his baseline level of function and would benefit from skilled inpt PT followed by HHPT. DISCHARGE RECOMMENDATIONS  PT Discharge Recommendations: Home with home health PT/OT     PLAN  PT Treatment Plan: Bed mobility; Endurance; Patient education; Family education;Gait training;Transfer training;Balance training  Rehab Potential : Good  Frequency (Obs): 3-5x/week    CURRENT GOALS     Goal #1 Patient is able to demonstrate supine - sit EOB @ level: supervision      Goal #2 Patient is able to demonstrate transfers EOB to/from Chair/Wheelchair at assistance level: supervision      Goal #3 Patient is able to ambulate 50 feet with assist device:  LRAD  at assistance level: supervision- MET  - update to 100'      Goal #4 Patient able to perform stairs with rail and SBA   Goal #5     Goal #6     Goal Comments: Goals established on 2023 all goals ongoing except #3 and updated. SUBJECTIVE  Pt pleasant and cooperative.     OBJECTIVE  Precautions: Chest tube    WEIGHT BEARING RESTRICTION  Weight Bearing Restriction: None                PAIN ASSESSMENT   Ratin  Location: denies pain at time of session  Management Techniques: Repositioning    BALANCE                                                                                                                       Static Sitting: Good  Dynamic Sitting: Good           Static Standing: Good  Dynamic Standing: Good    ACTIVITY TOLERANCE                         O2 WALK  Oxygen Therapy  SPO2% on Oxygen at Rest: 91  At rest oxygen flow (liters per minute): 15  SPO2% Ambulation on Oxygen: 88  Ambulation oxygen flow (liters per minute): 15      AM-PAC '6-Clicks' INPATIENT SHORT FORM - BASIC MOBILITY  How much difficulty does the patient currently have. .. Patient Difficulty: Turning over in bed (including adjusting bedclothes, sheets and blankets)?: None   Patient Difficulty: Sitting down on and standing up from a chair with arms (e.g., wheelchair, bedside commode, etc.): None   Patient Difficulty: Moving from lying on back to sitting on the side of the bed?: A Little   How much help from another person does the patient currently need. .. Help from Another: Moving to and from a bed to a chair (including a wheelchair)?: A Little   Help from Another: Need to walk in hospital room?: A Little   Help from Another: Climbing 3-5 steps with a railing?: A Little       AM-PAC Score:  Raw Score: 20   Approx Degree of Impairment: 35.83%   Standardized Score (AM-PAC Scale): 47.67   CMS Modifier (G-Code): CJ    FUNCTIONAL ABILITY STATUS  Gait Assessment   Functional Mobility/Gait Assessment  Gait Assistance: Supervision  Distance (ft): 70  Assistive Device: None  Pattern: Within Functional Limits    Skilled Therapy Provided    Bed Mobility:  Rolling: NT   Supine<>Sit: NT   Sit<>Supine: NT     Transfer Mobility:  Sit<>Stand: sup   Stand<>Sit: sup   Gait: sup with no device in room until pt dropped to sats of 86% on 15 L O2 and pt began to fatigue. Therapist's Comments: pt is highly motivated to amb and is only limited by pulmonary endurance. THERAPEUTIC EXERCISES  Lower Extremity Alternating marching  Ankle pumps  Knee extension     Upper Extremity Shoulder retraction and shoulder rolls. Position Sitting     Repetitions   10   Sets   1     Patient End of Session: Up in chair;Needs met;Call light within reach;RN aware of session/findings; All patient questions and concerns addressed    PT Session Time: 30 minutes  Gait Training: 15 minutes  Therapeutic Activity: 10 minutes  Therapeutic Exercise: 5 minutes   Neuromuscular Re-education:  minutes

## 2023-08-30 NOTE — PLAN OF CARE
Assumed care of patient after shift report. Aox4. On Vapotherm 30L/60% FiO2. PRN tylenol for discomfort in bilateral feet.

## 2023-08-30 NOTE — PROGRESS NOTES
08/30/23 1651   Clinical Encounter Type   Visited With Patient; Health care provider   Patient's Supportive Strategies/Resources Very strong Buddhist marty and love of family   Patient Spiritual Encounters   Spiritual Assessment Completed Yes   Taxonomy   Intended Effects Demonstrate caring and concern   Methods Offer support   Interventions Acknowledge current situation; Active listening; Ask guided questions about marty;[place]     Simba Warren expressed great appreciation for staff/care that he has received. He expressed gratitude for family visiting as well as pastors. Simba Warren stated, \"I have so many people praying for me\". Patient prayed a prayer of thanksgiving for God's healing.

## 2023-08-30 NOTE — CM/SW NOTE
Pr given Eliquis coupon for free 30 day and $10 copay. Script still needs to sent to pt's pharmacy to make sure prior Mikala Marston is not needed.     Venetta Litten, LCSW  /Discharge Planner

## 2023-08-30 NOTE — CM/SW NOTE
08/30/23 1400   CM/SW Referral Data   Referral Source Physician   Reason for Referral Discharge planning   Informant Patient;EMR;Clinical Staff Member   Patient Info   Patient's Current Mental Status at Time of Assessment Alert;Oriented   Patient lives with Spouse/Significant other         Sw met with pt to discuss dc planning. Pt is 51 yo  male, admitted due to Acute hypoxic respiratory failure 2/2 Large left-sided necrotizing pneumonia and loculated effusion with Histoplasma. PT/OT rec HHC. Pt in agreement. He states he resides with his wife - she works during th day[facility] follow [facility] on [facility] and choice. ID plans are for po antifungal at dc. Watch for O2 needs.     Nik Landin LCSW  /Discharge Planner

## 2023-08-30 NOTE — PLAN OF CARE
Received pt at change of shift. Pt alert and oriented x4 on 13L HFNC this evening. Pt up in the chair all day. Did some work with PT. Roughly 2L urine output during the day. 1BM. Pt with great appetite. Will continue to monitor. See MAR/flowsheets for additional information.

## 2023-08-30 NOTE — CM/SW NOTE
08/30/23 1500   Choice of Post-Acute Provider   Informed patient of right to choose their preferred provider Yes   List of appropriate post-acute services provided to patient/family with quality data Yes   Patient/family choice Residential   Information given to Patient   [facility]ice financial disclosure given Yes     Pt given [place]  list and chose [facility]. Reserved in [place].     NIC CalvinW  /Discharge Planner

## 2023-08-30 NOTE — HOME CARE LIAISON
[facility] Spoke w/ patient who is agreeable with [facility].  Contact information placed on AVS.

## 2023-08-31 LAB
ALBUMIN SERPL-MCNC: 2.6 G/DL (ref 3.4–5)
ALBUMIN/GLOB SERPL: 0.8 {RATIO} (ref 1–2)
ALP LIVER SERPL-CCNC: 87 U/L
ALT SERPL-CCNC: 25 U/L
ANION GAP SERPL CALC-SCNC: 4 MMOL/L (ref 0–18)
AST SERPL-CCNC: 8 U/L (ref 15–37)
BILIRUB SERPL-MCNC: 0.5 MG/DL (ref 0.1–2)
BUN BLD-MCNC: 45 MG/DL (ref 7–18)
CALCIUM BLD-MCNC: 8.5 MG/DL (ref 8.5–10.1)
CHLORIDE SERPL-SCNC: 96 MMOL/L (ref 98–112)
CO2 SERPL-SCNC: 37 MMOL/L (ref 21–32)
CREAT BLD-MCNC: 1.09 MG/DL
EGFRCR SERPLBLD CKD-EPI 2021: 80 ML/MIN/1.73M2 (ref 60–?)
ERYTHROCYTE [DISTWIDTH] IN BLOOD BY AUTOMATED COUNT: 12.4 %
GLOBULIN PLAS-MCNC: 3.3 G/DL (ref 2.8–4.4)
GLUCOSE BLD-MCNC: 110 MG/DL (ref 70–99)
GLUCOSE BLD-MCNC: 114 MG/DL (ref 70–99)
GLUCOSE BLD-MCNC: 173 MG/DL (ref 70–99)
GLUCOSE BLD-MCNC: 180 MG/DL (ref 70–99)
GLUCOSE BLD-MCNC: 196 MG/DL (ref 70–99)
HCT VFR BLD AUTO: 40.6 %
HGB BLD-MCNC: 12.9 G/DL
MCH RBC QN AUTO: 26.9 PG (ref 26–34)
MCHC RBC AUTO-ENTMCNC: 31.8 G/DL (ref 31–37)
MCV RBC AUTO: 84.6 FL
OSMOLALITY SERPL CALC.SUM OF ELEC: 301 MOSM/KG (ref 275–295)
PLATELET # BLD AUTO: 440 10(3)UL (ref 150–450)
POTASSIUM SERPL-SCNC: 4.2 MMOL/L (ref 3.5–5.1)
PROT SERPL-MCNC: 5.9 G/DL (ref 6.4–8.2)
RBC # BLD AUTO: 4.8 X10(6)UL
SODIUM SERPL-SCNC: 137 MMOL/L (ref 136–145)
WBC # BLD AUTO: 14.9 X10(3) UL (ref 4–11)

## 2023-08-31 PROCEDURE — 99233 SBSQ HOSP IP/OBS HIGH 50: CPT | Performed by: HOSPITALIST

## 2023-08-31 PROCEDURE — 99291 CRITICAL CARE FIRST HOUR: CPT | Performed by: INTERNAL MEDICINE

## 2023-08-31 RX ORDER — PREDNISONE 20 MG/1
40 TABLET ORAL EVERY 24 HOURS
Status: DISCONTINUED | OUTPATIENT
Start: 2023-08-31 | End: 2023-09-06

## 2023-08-31 NOTE — PLAN OF CARE
Pt received sitting up in the chair. Wife at bedside. Pt assisted to the bathroom with standby assist. Tolerated well. Pt has diuresed from IVP Bumex given. Urine output >2 liters overnight. BLE and bilateral feet edematous. Pt states they are \"much better. \" Pedal pulses palpable. Pt given Tylenol for complaints of LE discomfort. O2 sat >92 % on 13 liters hiflo nasal cannula. No RAMOS noted when up to the bathroom. HR 80-90's NSR. Blood pressure normotensive. Pt states he feels rested this am. POC reviewed with pt. Understanding verbalized. See Epic for complete assessment.

## 2023-08-31 NOTE — PLAN OF CARE
Received pt at change of shift. Pt alert and oriented x4 on 8L NC this evening. Pt following all commands. Afebrile. VSS. Good appetite, adequate urinary output. 1BM today. Walking in hallways with therapy. Minimal desaturation. Will continue to monitor. See MAR/flowsheets for additional information.

## 2023-09-01 LAB
ALBUMIN SERPL-MCNC: 2.8 G/DL (ref 3.4–5)
ALBUMIN/GLOB SERPL: 1 {RATIO} (ref 1–2)
ALP LIVER SERPL-CCNC: 76 U/L
ALT SERPL-CCNC: 22 U/L
ANION GAP SERPL CALC-SCNC: 4 MMOL/L (ref 0–18)
AST SERPL-CCNC: 12 U/L (ref 15–37)
BILIRUB SERPL-MCNC: 0.7 MG/DL (ref 0.1–2)
BUN BLD-MCNC: 43 MG/DL (ref 7–18)
CALCIUM BLD-MCNC: 8.7 MG/DL (ref 8.5–10.1)
CHLORIDE SERPL-SCNC: 97 MMOL/L (ref 98–112)
CO2 SERPL-SCNC: 34 MMOL/L (ref 21–32)
CREAT BLD-MCNC: 1.03 MG/DL
EGFRCR SERPLBLD CKD-EPI 2021: 86 ML/MIN/1.73M2 (ref 60–?)
ERYTHROCYTE [DISTWIDTH] IN BLOOD BY AUTOMATED COUNT: 12.4 %
GLOBULIN PLAS-MCNC: 2.9 G/DL (ref 2.8–4.4)
GLUCOSE BLD-MCNC: 151 MG/DL (ref 70–99)
GLUCOSE BLD-MCNC: 162 MG/DL (ref 70–99)
GLUCOSE BLD-MCNC: 218 MG/DL (ref 70–99)
GLUCOSE BLD-MCNC: 81 MG/DL (ref 70–99)
HCT VFR BLD AUTO: 39.3 %
HGB BLD-MCNC: 12.3 G/DL
MCH RBC QN AUTO: 27.1 PG (ref 26–34)
MCHC RBC AUTO-ENTMCNC: 31.3 G/DL (ref 31–37)
MCV RBC AUTO: 86.6 FL
OSMOLALITY SERPL CALC.SUM OF ELEC: 297 MOSM/KG (ref 275–295)
PLATELET # BLD AUTO: 373 10(3)UL (ref 150–450)
POTASSIUM SERPL-SCNC: 3.9 MMOL/L (ref 3.5–5.1)
PROT SERPL-MCNC: 5.7 G/DL (ref 6.4–8.2)
RBC # BLD AUTO: 4.54 X10(6)UL
SODIUM SERPL-SCNC: 135 MMOL/L (ref 136–145)
WBC # BLD AUTO: 11.8 X10(3) UL (ref 4–11)

## 2023-09-01 PROCEDURE — 99232 SBSQ HOSP IP/OBS MODERATE 35: CPT | Performed by: INTERNAL MEDICINE

## 2023-09-01 PROCEDURE — 99233 SBSQ HOSP IP/OBS HIGH 50: CPT | Performed by: INTERNAL MEDICINE

## 2023-09-01 PROCEDURE — 99233 SBSQ HOSP IP/OBS HIGH 50: CPT | Performed by: STUDENT IN AN ORGANIZED HEALTH CARE EDUCATION/TRAINING PROGRAM

## 2023-09-01 RX ORDER — BUMETANIDE 0.25 MG/ML
2 INJECTION INTRAMUSCULAR; INTRAVENOUS EVERY 12 HOURS SCHEDULED
Status: COMPLETED | OUTPATIENT
Start: 2023-09-01 | End: 2023-09-02

## 2023-09-01 RX ORDER — ALBUMIN (HUMAN) 12.5 G/50ML
25 SOLUTION INTRAVENOUS EVERY 12 HOURS SCHEDULED
Status: COMPLETED | OUTPATIENT
Start: 2023-09-01 | End: 2023-09-02

## 2023-09-01 NOTE — PHYSICAL THERAPY NOTE
PHYSICAL THERAPY TREATMENT NOTE - INPATIENT    Room Number: 451/451-A     Session: 3/6     Number of Visits to Meet Established Goals: 4    Presenting Problem: necrotizing PNA  Co-Morbidities : DM, hx of COVID in 2020    ASSESSMENT     Patient presents to PT sitting up in the bedside chair, currently on 6L high flow NC and RN present at bedside working with IV. PT observed patient ambulating halls with RN earlier in the morning and patient highly motivated to work with PT. Per RN, patient ambulates on 10L high-flow NC. Patient performs sit <> stand transfers with CGA/supervision. He requires single UE support on the IV pole and occasional light touch on the wall railing while ambulating in the halls. 1 standing rest break due to patient feeling emotional while discussing care of Veterans. SpO2 90% during ambulation. Patient returned to sitting in the bedside chair upon return to his room. Demonstrated and patient able to return demonstration of seated BLE therex including marches, LAQ's and ankle pumps. Encouraged patient to remain compliant in his walking program - patient voiced understanding. Recommend dc home with HHPT. DISCHARGE RECOMMENDATIONS  PT Discharge Recommendations: Home with home health PT     PLAN  PT Treatment Plan: Bed mobility; Endurance; Energy conservation;Patient education; Family education;Gait training;Strengthening;Stair training;Transfer training;Balance training  Rehab Potential : Good  Frequency (Obs): 3-5x/week    CURRENT GOALS     Goal #1 Patient is able to demonstrate supine - sit EOB @ level: supervision      Goal #2 Patient is able to demonstrate transfers EOB to/from Chair/Wheelchair at assistance level: supervision      Goal #3 Patient is able to ambulate 50 feet with assist device:  LRAD  at assistance level: supervision- MET 8/30 - update to 100' - MET 9/1 - update to 300 ft.       Goal #4 Patient able to perform stairs with rail and SBA   Goal #5     Goal #6     Goal Comments: Goals established on 2023 all goals ongoing except #3 and updated. SUBJECTIVE  \"[employer] play today! \"    OBJECTIVE  Precautions: Chest tube    WEIGHT BEARING RESTRICTION  Weight Bearing Restriction: None                PAIN ASSESSMENT   Ratin  Location: patient reporting some edema in his BLE's  Management Techniques: Repositioning; Activity promotion    BALANCE                                                                                                                       Static Sitting: Good  Dynamic Sitting: Good           Static Standing: Good  Dynamic Standing: Good    ACTIVITY TOLERANCE                         O2 WALK  Oxygen Therapy  SPO2% on Oxygen at Rest: 91  At rest oxygen flow (liters per minute): 6  SPO2% Ambulation on Oxygen: 90  Ambulation oxygen flow (liters per minute): 10      AM-PAC '6-Clicks' INPATIENT SHORT FORM - BASIC MOBILITY  How much difficulty does the patient currently have. .. Patient Difficulty: Turning over in bed (including adjusting bedclothes, sheets and blankets)?: None   Patient Difficulty: Sitting down on and standing up from a chair with arms (e.g., wheelchair, bedside commode, etc.): None   Patient Difficulty: Moving from lying on back to sitting on the side of the bed?: A Little   How much help from another person does the patient currently need. ..    Help from Another: Moving to and from a bed to a chair (including a wheelchair)?: A Little   Help from Another: Need to walk in hospital room?: A Little   Help from Another: Climbing 3-5 steps with a railing?: A Little       AM-PAC Score:  Raw Score: 20   Approx Degree of Impairment: 35.83%   Standardized Score (AM-PAC Scale): 47.67   CMS Modifier (G-Code): CJ    FUNCTIONAL ABILITY STATUS  Gait Assessment   Functional Mobility/Gait Assessment  Gait Assistance: Supervision  Distance (ft): 125  Assistive Device:  (single UE support on the IV pole, occasional light touch on wall railing)  Pattern: Within Functional Limits    Skilled Therapy Provided    Bed Mobility:  Rolling: NT   Supine<>Sit: NT   Sit<>Supine: NT     Transfer Mobility:  Sit<>Stand: CGA/supervision   Stand<>Sit: CGA/supervision   Gait: x125 ft. With supervision, occasional light touch of other extremity on the wall railing; 1 standing and 1 seated rest break; on 10L O2 via high-flow NC    Therapist's Comments: Patient will continue to benefit from skilled IP PT to progress his strength, endurance and functional mobility for progression towards his baseline function. THERAPEUTIC EXERCISES  Lower Extremity Alternating marching  Ankle pumps  LAQ     Position Supported sitting     Repetitions   10   Sets   1     Patient End of Session: Up in chair;Needs met;Call light within reach;RN aware of session/findings; All patient questions and concerns addressed    PT Session Time: 30 minutes  Gait Trainin minutes  Therapeutic Activity: 10 minutes  Therapeutic Exercise: 0 minutes   Neuromuscular Re-education: 0 minutes

## 2023-09-01 NOTE — PLAN OF CARE
Assumed pt care around 0730. Pt AOx4, follows commands. O2 sats >88% on 6 L O2 via HFNC at rest, 8-10 L O2 w/ activity. SR/ST on tele. VSS. Tolerating ADA diet. Walking in halls w/ SBA assist. 1 bm today. See emar and flowsheets for further data.    Problem: Patient/Family Goals  Goal: Patient/Family Long Term Goal  Description: Patient's Long Term Goal: to be dsicharged    Interventions:  - follow MD orders  - See additional Care Plan goals for specific interventions  Outcome: Progressing  Goal: Patient/Family Short Term Goal  Description: Patient's Short Term Goal:   08/14NOC: NPO @oooo for thoracentesis tomorrow, pain management  08/17NOC: get sleep and cough less  8/17 AM: Sepsis protocol   8/18: feel better , control cough   8/19 noc; reduce fever  8/19am: tolerate O2 walk   8/19 noc: improve 02 needs  8/20am: get chest tube today   8/20 noc: Wean 02 needs   8/21 AM: Chest tube, wean O2  8/22 AM: Wean O2      Interventions:   - PRN pain meds  -PRN cough meds  - See additional Care Plan goals for specific interventions  Outcome: Progressing     Problem: RESPIRATORY - ADULT  Goal: Achieves optimal ventilation and oxygenation  Description: INTERVENTIONS:  - Assess for changes in respiratory status  - Assess for changes in mentation and behavior  - Position to facilitate oxygenation and minimize respiratory effort  - Oxygen supplementation based on oxygen saturation or ABGs  - Provide Smoking Cessation handout, if applicable  - Encourage broncho-pulmonary hygiene including cough, deep breathe, Incentive Spirometry  - Assess the need for suctioning and perform as needed  - Assess and instruct to report SOB or any respiratory difficulty  - Respiratory Therapy support as indicated  - Manage/alleviate anxiety  - Monitor for signs/symptoms of CO2 retention  Outcome: Progressing     Problem: METABOLIC/FLUID AND ELECTROLYTES - ADULT  Goal: Glucose maintained within prescribed range  Description: INTERVENTIONS:  - Monitor Blood Glucose as ordered  - Assess for signs and symptoms of hyperglycemia and hypoglycemia  - Administer ordered medications to maintain glucose within target range  - Assess barriers to adequate nutritional intake and initiate nutrition consult as needed  - Instruct patient on self management of diabetes  Outcome: Progressing  Goal: Electrolytes maintained within normal limits  Description: INTERVENTIONS:  - Monitor labs and rhythm and assess patient for signs and symptoms of electrolyte imbalances  - Administer electrolyte replacement as ordered  - Monitor response to electrolyte replacements, including rhythm and repeat lab results as appropriate  - Fluid restriction as ordered  - Instruct patient on fluid and nutrition restrictions as appropriate  Outcome: Progressing  Goal: Hemodynamic stability and optimal renal function maintained  Description: INTERVENTIONS:  - Monitor labs and assess for signs and symptoms of volume excess or deficit  - Monitor intake, output and patient weight  - Monitor urine specific gravity, serum osmolarity and serum sodium as indicated or ordered  - Monitor response to interventions for patient's volume status, including labs, urine output, blood pressure (other measures as available)  - Encourage oral intake as appropriate  - Instruct patient on fluid and nutrition restrictions as appropriate  Outcome: Progressing

## 2023-09-01 NOTE — PROGRESS NOTES
Received AO x4; On 8L HFNC, to keep sats >88%  SR on the monitor, ST on activity; BP stable, checked Q2hrs; Afebrile. Bandage on the previous CT site-Posterior upper back area, CDI  +pitting edema of BLE; complained of burning pain-- PRN tylenol and ibuprofen given as ordered. Placed cold towel on BLE-gave relief. On Bumex IVP and Albumin IV. Voided mulitple times-large UO.   2030- endorsed to SD RN for further care.

## 2023-09-02 LAB
ANION GAP SERPL CALC-SCNC: 4 MMOL/L (ref 0–18)
BASOPHILS # BLD AUTO: 0.01 X10(3) UL (ref 0–0.2)
BASOPHILS NFR BLD AUTO: 0.1 %
BUN BLD-MCNC: 41 MG/DL (ref 7–18)
CALCIUM BLD-MCNC: 8.6 MG/DL (ref 8.5–10.1)
CHLORIDE SERPL-SCNC: 95 MMOL/L (ref 98–112)
CO2 SERPL-SCNC: 36 MMOL/L (ref 21–32)
CREAT BLD-MCNC: 1.14 MG/DL
EGFRCR SERPLBLD CKD-EPI 2021: 76 ML/MIN/1.73M2 (ref 60–?)
EOSINOPHIL # BLD AUTO: 0 X10(3) UL (ref 0–0.7)
EOSINOPHIL NFR BLD AUTO: 0 %
ERYTHROCYTE [DISTWIDTH] IN BLOOD BY AUTOMATED COUNT: 12.3 %
GLUCOSE BLD-MCNC: 115 MG/DL (ref 70–99)
GLUCOSE BLD-MCNC: 123 MG/DL (ref 70–99)
GLUCOSE BLD-MCNC: 157 MG/DL (ref 70–99)
GLUCOSE BLD-MCNC: 180 MG/DL (ref 70–99)
GLUCOSE BLD-MCNC: 286 MG/DL (ref 70–99)
HCT VFR BLD AUTO: 38.9 %
HGB BLD-MCNC: 12.3 G/DL
IMM GRANULOCYTES # BLD AUTO: 0.05 X10(3) UL (ref 0–1)
IMM GRANULOCYTES NFR BLD: 0.4 %
LYMPHOCYTES # BLD AUTO: 0.9 X10(3) UL (ref 1–4)
LYMPHOCYTES NFR BLD AUTO: 8 %
MCH RBC QN AUTO: 27 PG (ref 26–34)
MCHC RBC AUTO-ENTMCNC: 31.6 G/DL (ref 31–37)
MCV RBC AUTO: 85.5 FL
MONOCYTES # BLD AUTO: 0.27 X10(3) UL (ref 0.1–1)
MONOCYTES NFR BLD AUTO: 2.4 %
NEUTROPHILS # BLD AUTO: 10.04 X10 (3) UL (ref 1.5–7.7)
NEUTROPHILS # BLD AUTO: 10.04 X10(3) UL (ref 1.5–7.7)
NEUTROPHILS NFR BLD AUTO: 89.1 %
OSMOLALITY SERPL CALC.SUM OF ELEC: 301 MOSM/KG (ref 275–295)
PLATELET # BLD AUTO: 354 10(3)UL (ref 150–450)
POTASSIUM SERPL-SCNC: 4.1 MMOL/L (ref 3.5–5.1)
RBC # BLD AUTO: 4.55 X10(6)UL
SODIUM SERPL-SCNC: 135 MMOL/L (ref 136–145)
WBC # BLD AUTO: 11.3 X10(3) UL (ref 4–11)

## 2023-09-02 PROCEDURE — 99233 SBSQ HOSP IP/OBS HIGH 50: CPT | Performed by: STUDENT IN AN ORGANIZED HEALTH CARE EDUCATION/TRAINING PROGRAM

## 2023-09-02 PROCEDURE — 99232 SBSQ HOSP IP/OBS MODERATE 35: CPT | Performed by: INTERNAL MEDICINE

## 2023-09-02 RX ORDER — IPRATROPIUM BROMIDE AND ALBUTEROL SULFATE 2.5; .5 MG/3ML; MG/3ML
3 SOLUTION RESPIRATORY (INHALATION) EVERY 4 HOURS PRN
Status: DISCONTINUED | OUTPATIENT
Start: 2023-09-02 | End: 2023-09-06

## 2023-09-02 NOTE — PLAN OF CARE
Received pt A&Ox4 on 6L NC. Denies pain. Voids via urinal, monitoring output. Up in chair. Afebrile. VSS. See flowsheets. See MAR. Has transfer orders. Report called to [facility] AYANA JULIEN RN. Transferred out of ICU.

## 2023-09-02 NOTE — PLAN OF CARE
Care taken over at [address]. On 6L nc. Denies any sob. Medicated with prn ibuprofen for leg discomfort with relief. Sitting in chair initially. Assisted back to bed. Tolerating activity well. Voiding large amounts per urinal. Waiting for a medical bed.   Problem: RESPIRATORY - ADULT  Goal: Achieves optimal ventilation and oxygenation  Description: INTERVENTIONS:  - Assess for changes in respiratory status  - Assess for changes in mentation and behavior  - Position to facilitate oxygenation and minimize respiratory effort  - Oxygen supplementation based on oxygen saturation or ABGs  - Provide Smoking Cessation handout, if applicable  - Encourage broncho-pulmonary hygiene including cough, deep breathe, Incentive Spirometry  - Assess the need for suctioning and perform as needed  - Assess and instruct to report SOB or any respiratory difficulty  - Respiratory Therapy support as indicated  - Manage/alleviate anxiety  - Monitor for signs/symptoms of CO2 retention  Outcome: Progressing     Problem: METABOLIC/FLUID AND ELECTROLYTES - ADULT  Goal: Glucose maintained within prescribed range  Description: INTERVENTIONS:  - Monitor Blood Glucose as ordered  - Assess for signs and symptoms of hyperglycemia and hypoglycemia  - Administer ordered medications to maintain glucose within target range  - Assess barriers to adequate nutritional intake and initiate nutrition consult as needed  - Instruct patient on self management of diabetes  Outcome: Progressing  Goal: Electrolytes maintained within normal limits  Description: INTERVENTIONS:  - Monitor labs and rhythm and assess patient for signs and symptoms of electrolyte imbalances  - Administer electrolyte replacement as ordered  - Monitor response to electrolyte replacements, including rhythm and repeat lab results as appropriate  - Fluid restriction as ordered  - Instruct patient on fluid and nutrition restrictions as appropriate  Outcome: Progressing  Goal: Hemodynamic stability and optimal renal function maintained  Description: INTERVENTIONS:  - Monitor labs and assess for signs and symptoms of volume excess or deficit  - Monitor intake, output and patient weight  - Monitor urine specific gravity, serum osmolarity and serum sodium as indicated or ordered  - Monitor response to interventions for patient's volume status, including labs, urine output, blood pressure (other measures as available)  - Encourage oral intake as appropriate  - Instruct patient on fluid and nutrition restrictions as appropriate  Outcome: Progressing

## 2023-09-03 LAB
ANION GAP SERPL CALC-SCNC: 5 MMOL/L (ref 0–18)
BASOPHILS # BLD AUTO: 0.01 X10(3) UL (ref 0–0.2)
BASOPHILS NFR BLD AUTO: 0.1 %
BUN BLD-MCNC: 36 MG/DL (ref 7–18)
CALCIUM BLD-MCNC: 8.8 MG/DL (ref 8.5–10.1)
CHLORIDE SERPL-SCNC: 96 MMOL/L (ref 98–112)
CO2 SERPL-SCNC: 34 MMOL/L (ref 21–32)
CREAT BLD-MCNC: 0.9 MG/DL
EGFRCR SERPLBLD CKD-EPI 2021: 101 ML/MIN/1.73M2 (ref 60–?)
EOSINOPHIL # BLD AUTO: 0.01 X10(3) UL (ref 0–0.7)
EOSINOPHIL NFR BLD AUTO: 0.1 %
ERYTHROCYTE [DISTWIDTH] IN BLOOD BY AUTOMATED COUNT: 12.2 %
GLUCOSE BLD-MCNC: 131 MG/DL (ref 70–99)
GLUCOSE BLD-MCNC: 181 MG/DL (ref 70–99)
GLUCOSE BLD-MCNC: 195 MG/DL (ref 70–99)
GLUCOSE BLD-MCNC: 227 MG/DL (ref 70–99)
GLUCOSE BLD-MCNC: 370 MG/DL (ref 70–99)
HCT VFR BLD AUTO: 40.5 %
HGB BLD-MCNC: 12.5 G/DL
IMM GRANULOCYTES # BLD AUTO: 0.04 X10(3) UL (ref 0–1)
IMM GRANULOCYTES NFR BLD: 0.3 %
LYMPHOCYTES # BLD AUTO: 2.04 X10(3) UL (ref 1–4)
LYMPHOCYTES NFR BLD AUTO: 17.5 %
MCH RBC QN AUTO: 26.9 PG (ref 26–34)
MCHC RBC AUTO-ENTMCNC: 30.9 G/DL (ref 31–37)
MCV RBC AUTO: 87.1 FL
MONOCYTES # BLD AUTO: 0.73 X10(3) UL (ref 0.1–1)
MONOCYTES NFR BLD AUTO: 6.3 %
NEUTROPHILS # BLD AUTO: 8.81 X10 (3) UL (ref 1.5–7.7)
NEUTROPHILS # BLD AUTO: 8.81 X10(3) UL (ref 1.5–7.7)
NEUTROPHILS NFR BLD AUTO: 75.7 %
OSMOLALITY SERPL CALC.SUM OF ELEC: 293 MOSM/KG (ref 275–295)
PLATELET # BLD AUTO: 359 10(3)UL (ref 150–450)
POTASSIUM SERPL-SCNC: 3.5 MMOL/L (ref 3.5–5.1)
RBC # BLD AUTO: 4.65 X10(6)UL
SODIUM SERPL-SCNC: 135 MMOL/L (ref 136–145)
WBC # BLD AUTO: 11.6 X10(3) UL (ref 4–11)

## 2023-09-03 PROCEDURE — 99232 SBSQ HOSP IP/OBS MODERATE 35: CPT | Performed by: INTERNAL MEDICINE

## 2023-09-03 NOTE — PLAN OF CARE
9/03 am - Patient A/O x 4. On 1L via nasal canula. QID accuchek , carb control diet. ST with activity. No pain reported. Ambulating with no complaints. O2 walk done, 2L with exertion.      Problem: Patient/Family Goals  Goal: Patient/Family Long Term Goal  Description: Patient's Long Term Goal: to be dsicharged    Interventions:  - follow MD orders  - See additional Care Plan goals for specific interventions  Outcome: Progressing  Goal: Patient/Family Short Term Goal  Description: Patient's Short Term Goal:   08/14NOC: NPO @oooo for thoracentesis tomorrow, pain management  08/17NOC: get sleep and cough less  8/17 AM: Sepsis protocol   8/18: feel better , control cough   8/19 noc; reduce fever  8/19am: tolerate O2 walk   8/19 noc: improve 02 needs  8/20am: get chest tube today   8/20 noc: Wean 02 needs   8/21 AM: Chest tube, wean O2  8/22 AM: Wean O2  09/02/2023 - WEAN o2  09/03 am - WEAN O2      Interventions:   - PRN pain meds  -PRN cough meds  - See additional Care Plan goals for specific interventions  Outcome: Progressing     Problem: RESPIRATORY - ADULT  Goal: Achieves optimal ventilation and oxygenation  Description: INTERVENTIONS:  - Assess for changes in respiratory status  - Assess for changes in mentation and behavior  - Position to facilitate oxygenation and minimize respiratory effort  - Oxygen supplementation based on oxygen saturation or ABGs  - Provide Smoking Cessation handout, if applicable  - Encourage broncho-pulmonary hygiene including cough, deep breathe, Incentive Spirometry  - Assess the need for suctioning and perform as needed  - Assess and instruct to report SOB or any respiratory difficulty  - Respiratory Therapy support as indicated  - Manage/alleviate anxiety  - Monitor for signs/symptoms of CO2 retention  Outcome: Progressing     Problem: METABOLIC/FLUID AND ELECTROLYTES - ADULT  Goal: Glucose maintained within prescribed range  Description: INTERVENTIONS:  - Monitor Blood Glucose as ordered  - Assess for signs and symptoms of hyperglycemia and hypoglycemia  - Administer ordered medications to maintain glucose within target range  - Assess barriers to adequate nutritional intake and initiate nutrition consult as needed  - Instruct patient on self management of diabetes  Outcome: Progressing  Goal: Electrolytes maintained within normal limits  Description: INTERVENTIONS:  - Monitor labs and rhythm and assess patient for signs and symptoms of electrolyte imbalances  - Administer electrolyte replacement as ordered  - Monitor response to electrolyte replacements, including rhythm and repeat lab results as appropriate  - Fluid restriction as ordered  - Instruct patient on fluid and nutrition restrictions as appropriate  Outcome: Progressing  Goal: Hemodynamic stability and optimal renal function maintained  Description: INTERVENTIONS:  - Monitor labs and assess for signs and symptoms of volume excess or deficit  - Monitor intake, output and patient weight  - Monitor urine specific gravity, serum osmolarity and serum sodium as indicated or ordered  - Monitor response to interventions for patient's volume status, including labs, urine output, blood pressure (other measures as available)  - Encourage oral intake as appropriate  - Instruct patient on fluid and nutrition restrictions as appropriate  Outcome: Progressing

## 2023-09-03 NOTE — PROGRESS NOTES
09/03/23 1315   Mobility   O2 walk?  Yes   SPO2% on Room Air at Rest 90   SPO2% Ambulation on Room Air 87   SPO2% Ambulation on Oxygen 90   Ambulation oxygen flow (liters per minute) 2

## 2023-09-03 NOTE — PLAN OF CARE
Problem: Patient/Family Goals  Goal: Patient/Family Long Term Goal  Description: Patient's Long Term Goal: to be dsicharged    Interventions:  - follow MD orders  - See additional Care Plan goals for specific interventions  Outcome: Progressing  Goal: Patient/Family Short Term Goal  Description: Patient's Short Term Goal:   08/14NOC: NPO @oooo for thoracentesis tomorrow, pain management  08/17NOC: get sleep and cough less  8/17 AM: Sepsis protocol   8/18: feel better , control cough   8/19 noc; reduce fever  8/19am: tolerate O2 walk   8/19 noc: improve 02 needs  8/20am: get chest tube today   8/20 noc: Wean 02 needs   8/21 AM: Chest tube, wean O2  8/22 AM: Wean O2  09/02/2023 - WEAN o2      Interventions:   - PRN pain meds  -PRN cough meds  - See additional Care Plan goals for specific interventions  Outcome: Progressing     Problem: RESPIRATORY - ADULT  Goal: Achieves optimal ventilation and oxygenation  Description: INTERVENTIONS:  - Assess for changes in respiratory status  - Assess for changes in mentation and behavior  - Position to facilitate oxygenation and minimize respiratory effort  - Oxygen supplementation based on oxygen saturation or ABGs  - Provide Smoking Cessation handout, if applicable  - Encourage broncho-pulmonary hygiene including cough, deep breathe, Incentive Spirometry  - Assess the need for suctioning and perform as needed  - Assess and instruct to report SOB or any respiratory difficulty  - Respiratory Therapy support as indicated  - Manage/alleviate anxiety  - Monitor for signs/symptoms of CO2 retention  Outcome: Progressing     Problem: METABOLIC/FLUID AND ELECTROLYTES - ADULT  Goal: Glucose maintained within prescribed range  Description: INTERVENTIONS:  - Monitor Blood Glucose as ordered  - Assess for signs and symptoms of hyperglycemia and hypoglycemia  - Administer ordered medications to maintain glucose within target range  - Assess barriers to adequate nutritional intake and initiate nutrition consult as needed  - Instruct patient on self management of diabetes  Outcome: Progressing  Goal: Electrolytes maintained within normal limits  Description: INTERVENTIONS:  - Monitor labs and rhythm and assess patient for signs and symptoms of electrolyte imbalances  - Administer electrolyte replacement as ordered  - Monitor response to electrolyte replacements, including rhythm and repeat lab results as appropriate  - Fluid restriction as ordered  - Instruct patient on fluid and nutrition restrictions as appropriate  Outcome: Progressing  Goal: Hemodynamic stability and optimal renal function maintained  Description: INTERVENTIONS:  - Monitor labs and assess for signs and symptoms of volume excess or deficit  - Monitor intake, output and patient weight  - Monitor urine specific gravity, serum osmolarity and serum sodium as indicated or ordered  - Monitor response to interventions for patient's volume status, including labs, urine output, blood pressure (other measures as available)  - Encourage oral intake as appropriate  - Instruct patient on fluid and nutrition restrictions as appropriate  Outcome: Progressing

## 2023-09-03 NOTE — PROGRESS NOTES
PATIENT A&OX4.  DENIES PAIN/DISCOMFORT.  NOW WEANED TO 4L OF SUPPLEMENTAL O2 AND SATURATING ABOVE 90%. REPORTS OCCASIONAL PRODUCTIVE COUGH WITH  SMALL AMOUNT THICK YELLOW SPUTUM. TESSALON AND HYDROMET WERE GIVEN. HE DENIES NAUSEA, NO VOMITING, NO STOOL. VOIDING GOOD AMOUNTS PER URINAL. IV VORICONAZOLE as ORDERED. NIGHT TIME ACCUCHECK = 157.

## 2023-09-04 LAB
ANION GAP SERPL CALC-SCNC: 6 MMOL/L (ref 0–18)
BASOPHILS # BLD AUTO: 0.01 X10(3) UL (ref 0–0.2)
BASOPHILS NFR BLD AUTO: 0.1 %
BUN BLD-MCNC: 29 MG/DL (ref 7–18)
CALCIUM BLD-MCNC: 9.1 MG/DL (ref 8.5–10.1)
CHLORIDE SERPL-SCNC: 96 MMOL/L (ref 98–112)
CO2 SERPL-SCNC: 32 MMOL/L (ref 21–32)
CREAT BLD-MCNC: 0.89 MG/DL
EGFRCR SERPLBLD CKD-EPI 2021: 101 ML/MIN/1.73M2 (ref 60–?)
EOSINOPHIL # BLD AUTO: 0.01 X10(3) UL (ref 0–0.7)
EOSINOPHIL NFR BLD AUTO: 0.1 %
ERYTHROCYTE [DISTWIDTH] IN BLOOD BY AUTOMATED COUNT: 12.3 %
GLUCOSE BLD-MCNC: 112 MG/DL (ref 70–99)
GLUCOSE BLD-MCNC: 124 MG/DL (ref 70–99)
GLUCOSE BLD-MCNC: 170 MG/DL (ref 70–99)
GLUCOSE BLD-MCNC: 207 MG/DL (ref 70–99)
GLUCOSE BLD-MCNC: 228 MG/DL (ref 70–99)
HCT VFR BLD AUTO: 40.6 %
HGB BLD-MCNC: 13.3 G/DL
IMM GRANULOCYTES # BLD AUTO: 0.04 X10(3) UL (ref 0–1)
IMM GRANULOCYTES NFR BLD: 0.3 %
LYMPHOCYTES # BLD AUTO: 2.72 X10(3) UL (ref 1–4)
LYMPHOCYTES NFR BLD AUTO: 22.7 %
MCH RBC QN AUTO: 27.4 PG (ref 26–34)
MCHC RBC AUTO-ENTMCNC: 32.8 G/DL (ref 31–37)
MCV RBC AUTO: 83.5 FL
MONOCYTES # BLD AUTO: 0.95 X10(3) UL (ref 0.1–1)
MONOCYTES NFR BLD AUTO: 7.9 %
NEUTROPHILS # BLD AUTO: 8.24 X10 (3) UL (ref 1.5–7.7)
NEUTROPHILS # BLD AUTO: 8.24 X10(3) UL (ref 1.5–7.7)
NEUTROPHILS NFR BLD AUTO: 68.9 %
OSMOLALITY SERPL CALC.SUM OF ELEC: 290 MOSM/KG (ref 275–295)
PLATELET # BLD AUTO: 344 10(3)UL (ref 150–450)
POTASSIUM SERPL-SCNC: 3.5 MMOL/L (ref 3.5–5.1)
RBC # BLD AUTO: 4.86 X10(6)UL
SODIUM SERPL-SCNC: 134 MMOL/L (ref 136–145)
WBC # BLD AUTO: 12 X10(3) UL (ref 4–11)

## 2023-09-04 PROCEDURE — 99232 SBSQ HOSP IP/OBS MODERATE 35: CPT | Performed by: INTERNAL MEDICINE

## 2023-09-04 NOTE — PROGRESS NOTES
PATIENT SITTING IN RECLINER TALKING TO HIS SISTER AND BROTHER IN LAW DURING HANDOFF. ACCORDING TO REPORT, THEY CAME TO CALM HIM DOWN AFTER HE HAD AN ARGUMENT WITH HIS WIFE EARLIER. HE IS ONLY SATURATING IN THE LOW 80's ON ROOM AIR. HE ADMITS TO BEING SHORT OF BREATH. \"I'VE WORKED MYSELF UP BECAUSE OF THIS FAMILY STUFF THAT'S GOING ON AND NOW I CAN'T BREATHE\". SUPPLEMENTAL O2 PROVIDED AT 3L/NC TO GET HIS SATURATION TO 92%. HE WAS ENCOURAGED TO DO DEEP BREATHING EXERCISES AND TO FOCUS ON GETTING BETTER - GOOD COMPLIANCE.

## 2023-09-04 NOTE — PLAN OF CARE
Problem: Patient/Family Goals  Goal: Patient/Family Long Term Goal  Description: Patient's Long Term Goal: to be dsicharged    Interventions:  - follow MD orders  - See additional Care Plan goals for specific interventions  Outcome: Progressing  Goal: Patient/Family Short Term Goal  Description: Patient's Short Term Goal:   08/14NOC: NPO @oooo for thoracentesis tomorrow, pain management  08/17NOC: get sleep and cough less  8/17 AM: Sepsis protocol   8/18: feel better , control cough   8/19 noc; reduce fever  8/19am: tolerate O2 walk   8/19 noc: improve 02 needs  8/20am: get chest tube today   8/20 noc: Wean 02 needs   8/21 AM: Chest tube, wean O2  8/22 AM: Wean O2  09/02/2023 - WEAN o2  09/03 am - WEAN O2  09/03/2023 - WEAN O2      Interventions:   - PRN pain meds  -PRN cough meds  - See additional Care Plan goals for specific interventions  Outcome: Progressing     Problem: RESPIRATORY - ADULT  Goal: Achieves optimal ventilation and oxygenation  Description: INTERVENTIONS:  - Assess for changes in respiratory status  - Assess for changes in mentation and behavior  - Position to facilitate oxygenation and minimize respiratory effort  - Oxygen supplementation based on oxygen saturation or ABGs  - Provide Smoking Cessation handout, if applicable  - Encourage broncho-pulmonary hygiene including cough, deep breathe, Incentive Spirometry  - Assess the need for suctioning and perform as needed  - Assess and instruct to report SOB or any respiratory difficulty  - Respiratory Therapy support as indicated  - Manage/alleviate anxiety  - Monitor for signs/symptoms of CO2 retention  Outcome: Progressing     Problem: METABOLIC/FLUID AND ELECTROLYTES - ADULT  Goal: Glucose maintained within prescribed range  Description: INTERVENTIONS:  - Monitor Blood Glucose as ordered  - Assess for signs and symptoms of hyperglycemia and hypoglycemia  - Administer ordered medications to maintain glucose within target range  - Assess barriers to adequate nutritional intake and initiate nutrition consult as needed  - Instruct patient on self management of diabetes  Outcome: Progressing  Goal: Electrolytes maintained within normal limits  Description: INTERVENTIONS:  - Monitor labs and rhythm and assess patient for signs and symptoms of electrolyte imbalances  - Administer electrolyte replacement as ordered  - Monitor response to electrolyte replacements, including rhythm and repeat lab results as appropriate  - Fluid restriction as ordered  - Instruct patient on fluid and nutrition restrictions as appropriate  Outcome: Progressing  Goal: Hemodynamic stability and optimal renal function maintained  Description: INTERVENTIONS:  - Monitor labs and assess for signs and symptoms of volume excess or deficit  - Monitor intake, output and patient weight  - Monitor urine specific gravity, serum osmolarity and serum sodium as indicated or ordered  - Monitor response to interventions for patient's volume status, including labs, urine output, blood pressure (other measures as available)  - Encourage oral intake as appropriate  - Instruct patient on fluid and nutrition restrictions as appropriate  Outcome: Progressing

## 2023-09-04 NOTE — PLAN OF CARE
9/4 AM: Pt is A/O x 4, cheerful today. Lung sounds are still diminished, room air at rest, 1-2L with exertion, will reevaluate O2 walk. BP and HR are WNL, NSR on tele. BM today, voids with urinal/in bathroom. SBA.  QID accucheck     Problem: Patient/Family Goals  Goal: Patient/Family Long Term Goal  Description: Patient's Long Term Goal: to be dsicharged    Interventions:  - follow MD orders  - See additional Care Plan goals for specific interventions  Outcome: Progressing  Goal: Patient/Family Short Term Goal  Description: Patient's Short Term Goal:   08/14NOC: NPO @oooo for thoracentesis tomorrow, pain management  08/17NOC: get sleep and cough less  8/17 AM: Sepsis protocol   8/18: feel better , control cough   8/19 noc; reduce fever  8/19am: tolerate O2 walk   8/19 noc: improve 02 needs  8/20am: get chest tube today   8/20 noc: Wean 02 needs   8/21 AM: Chest tube, wean O2  8/22 AM: Wean O2  09/02/2023 - WEAN o2  09/03 am - WEAN O2  09/03/2023 - WEAN O2  9/4 AM - O2 walk      Interventions:   - PRN pain meds  -PRN cough meds  - See additional Care Plan goals for specific interventions  Outcome: Progressing

## 2023-09-04 NOTE — PROGRESS NOTES
PATIENT IS MUCH CALMER NOW. BEDTIME ACCUCHECK 370. DR Nilesh Ndiaye WAS NOTIFIED. PATIENT WAS GIVEN 15 UNITS OF NOVOLOG subcutaneous as ORDERED.

## 2023-09-05 ENCOUNTER — APPOINTMENT (OUTPATIENT)
Dept: GENERAL RADIOLOGY | Facility: HOSPITAL | Age: 53
End: 2023-09-05
Attending: INTERNAL MEDICINE
Payer: COMMERCIAL

## 2023-09-05 LAB
GLUCOSE BLD-MCNC: 143 MG/DL (ref 70–99)
GLUCOSE BLD-MCNC: 153 MG/DL (ref 70–99)
GLUCOSE BLD-MCNC: 196 MG/DL (ref 70–99)
GLUCOSE BLD-MCNC: 69 MG/DL (ref 70–99)
GLUCOSE BLD-MCNC: 91 MG/DL (ref 70–99)

## 2023-09-05 PROCEDURE — 99232 SBSQ HOSP IP/OBS MODERATE 35: CPT | Performed by: INTERNAL MEDICINE

## 2023-09-05 PROCEDURE — 71045 X-RAY EXAM CHEST 1 VIEW: CPT | Performed by: INTERNAL MEDICINE

## 2023-09-05 RX ORDER — DILTIAZEM HYDROCHLORIDE 120 MG/1
120 CAPSULE, EXTENDED RELEASE ORAL DAILY
Qty: 30 CAPSULE | Refills: 11 | Status: SHIPPED | OUTPATIENT
Start: 2023-09-06 | End: 2024-09-05

## 2023-09-05 RX ORDER — FUROSEMIDE 10 MG/ML
40 INJECTION INTRAMUSCULAR; INTRAVENOUS ONCE
Status: COMPLETED | OUTPATIENT
Start: 2023-09-05 | End: 2023-09-05

## 2023-09-05 RX ORDER — VORICONAZOLE 200 MG/1
300 TABLET, FILM COATED ORAL 2 TIMES DAILY
Qty: 90 TABLET | Refills: 2 | Status: SHIPPED | OUTPATIENT
Start: 2023-09-05 | End: 2023-09-06

## 2023-09-05 NOTE — PLAN OF CARE
Pt A&OX4. Glasses. On RA sating WNL. . PRN cough meds given. See MAR. On tele. NSR. On eliquis. No c/o pain at this time. QID accuchecks. IV antifungal. Continent. Up standby. BRP. Pt updated on plan of care and verbalized understanding. Call light within reach. All needs met at this time. Will follow.      Problem: Patient/Family Goals  Goal: Patient/Family Long Term Goal  Description: Patient's Long Term Goal: to be dsicharged    Interventions:  - follow MD orders  - See additional Care Plan goals for specific interventions  Outcome: Progressing  Goal: Patient/Family Short Term Goal  Description: Patient's Short Term Goal:   08/14NOC: NPO @oooo for thoracentesis tomorrow, pain management  08/17NOC: get sleep and cough less  8/17 AM: Sepsis protocol   8/18: feel better , control cough   8/19 noc; reduce fever  8/19am: tolerate O2 walk   8/19 noc: improve 02 needs  8/20am: get chest tube today   8/20 noc: Wean 02 needs   8/21 AM: Chest tube, wean O2  8/22 AM: Wean O2  09/02/2023 - WEAN o2  09/03 am - WEAN O2  09/03/2023 - WEAN O2  9/4 AM - O2 walk  9/4 NOC: sleep, control cough    Interventions:   - PRN pain meds  -PRN cough meds  - See additional Care Plan goals for specific interventions  Outcome: Progressing     Problem: RESPIRATORY - ADULT  Goal: Achieves optimal ventilation and oxygenation  Description: INTERVENTIONS:  - Assess for changes in respiratory status  - Assess for changes in mentation and behavior  - Position to facilitate oxygenation and minimize respiratory effort  - Oxygen supplementation based on oxygen saturation or ABGs  - Provide Smoking Cessation handout, if applicable  - Encourage broncho-pulmonary hygiene including cough, deep breathe, Incentive Spirometry  - Assess the need for suctioning and perform as needed  - Assess and instruct to report SOB or any respiratory difficulty  - Respiratory Therapy support as indicated  - Manage/alleviate anxiety  - Monitor for signs/symptoms of CO2 retention  Outcome: Progressing     Problem: METABOLIC/FLUID AND ELECTROLYTES - ADULT  Goal: Glucose maintained within prescribed range  Description: INTERVENTIONS:  - Monitor Blood Glucose as ordered  - Assess for signs and symptoms of hyperglycemia and hypoglycemia  - Administer ordered medications to maintain glucose within target range  - Assess barriers to adequate nutritional intake and initiate nutrition consult as needed  - Instruct patient on self management of diabetes  Outcome: Progressing  Goal: Electrolytes maintained within normal limits  Description: INTERVENTIONS:  - Monitor labs and rhythm and assess patient for signs and symptoms of electrolyte imbalances  - Administer electrolyte replacement as ordered  - Monitor response to electrolyte replacements, including rhythm and repeat lab results as appropriate  - Fluid restriction as ordered  - Instruct patient on fluid and nutrition restrictions as appropriate  Outcome: Progressing  Goal: Hemodynamic stability and optimal renal function maintained  Description: INTERVENTIONS:  - Monitor labs and assess for signs and symptoms of volume excess or deficit  - Monitor intake, output and patient weight  - Monitor urine specific gravity, serum osmolarity and serum sodium as indicated or ordered  - Monitor response to interventions for patient's volume status, including labs, urine output, blood pressure (other measures as available)  - Encourage oral intake as appropriate  - Instruct patient on fluid and nutrition restrictions as appropriate  Outcome: Progressing

## 2023-09-05 NOTE — PHYSICAL THERAPY NOTE
PHYSICAL THERAPY TREATMENT NOTE - INPATIENT    Room Number: 451/451-A     Session:      Number of Visits to Meet Established Goals: 4    Presenting Problem: necrotizing PNA  Co-Morbidities : DM, hx of COVID in 2020    ASSESSMENT     Patient is progressing in strength, mobility and endurance. Ambulated with stand by assist and hand support on objects in the hallway for activity tolerance. Patient declined use of RW. Patient was trained on stairs with use of one rail via side approach. Patient will benefit from ongoing skilled PT.   DISCHARGE RECOMMENDATIONS  PT Discharge Recommendations: Home with home health PT     PLAN  PT Treatment Plan: Bed mobility; Endurance; Energy conservation;Patient education; Family education;Gait training;Strengthening;Stair training;Transfer training;Balance training  Rehab Potential : Good  Frequency (Obs): 3-5x/week    CURRENT GOALS     Goal #1 Patient is able to demonstrate supine - sit EOB @ level: supervision  MET      Goal #2 Patient is able to demonstrate transfers EOB to/from Chair/Wheelchair at assistance level: supervision  MET      Goal #3 Patient is able to ambulate 50 feet with assist device:  LRAD  at assistance level: supervision- MET  - update to 100' - MET  - update to 300 ft. Goal #4 Patient able to perform stairs with rail and SBA  MET   Goal #5     Goal #6     Goal Comments: Goals established on 2023 goal 3 ongoing      SUBJECTIVE  \" I am getting better. \"    OBJECTIVE  Precautions: Chest tube    WEIGHT BEARING RESTRICTION  Weight Bearing Restriction: None                PAIN ASSESSMENT   Ratin  Location: patient reporting some edema in his BLE's  Management Techniques: Repositioning; Activity promotion    BALANCE                                                                                                                       Static Sitting: Good  Dynamic Sitting: Good           Static Standin mymission2ber [address]  Dynamic Standin [employer] -    ACTIVITY TOLERANCE                         O2 WALK         AM-PAC '6-Clicks' INPATIENT SHORT FORM - BASIC MOBILITY  How much difficulty does the patient currently have. .. Patient Difficulty: Turning over in bed (including adjusting bedclothes, sheets and blankets)?: None   Patient Difficulty: Sitting down on and standing up from a chair with arms (e.g., wheelchair, bedside commode, etc.): None   Patient Difficulty: Moving from lying on back to sitting on the side of the bed?: None   How much help from another person does the patient currently need. .. Help from Another: Moving to and from a bed to a chair (including a wheelchair)?: None   Help from Another: Need to walk in hospital room?: A Little   Help from Another: Climbing 3-5 steps with a railing?: A Little       AM-PAC Score:  Raw Score: 22   Approx Degree of Impairment: 20.91%   Standardized Score (AM-PAC Scale): 53.28   CMS Modifier (G-Code): CJ    FUNCTIONAL ABILITY STATUS  Gait Assessment   Functional Mobility/Gait Assessment  Gait Assistance: Supervision  Distance (ft): 175  Assistive Device: None (frequently reaching for wall support or rail in the hallway)  Pattern:  (slow andres)  Stairs: Stairs  How Many Stairs: 13  Device: 1 Rail  Assist: Supervision    Skilled Therapy Provided    Bed Mobility:  Rolling: NT   Supine<>Sit: sup   Sit<>Supine:sup     Transfer Mobility:  Sit<>Stand: sup   Stand<>Sit: supervision   Gait: on RA, SBA. Frequently reaching for wall rail or objects in the hallway and four standing rest break to recover from fatigue. Alvarez le weakness noted. Patient maintains very slow andres to compensate for strength and endurance deficits. Safety discussed. Stair training provided: pt navigated 1 step, 3 steps, 6 steps twice with one rail and SBA. Cues for purse lip breathing and standing rest break to recover from SOB.      Therapist's Comments: Patient will continue to benefit from skilled IP PT to progress his strength, endurance and functional mobility for progression towards his baseline function. THERAPEUTIC EXERCISES  Lower Extremity Alternating marching  Ankle pumps  LAQ     Position Supported sitting     Repetitions   10   Sets   1     Patient End of Session: Up in chair;Needs met;Call light within reach;RN aware of session/findings; All patient questions and concerns addressed    PT Session Time: 30 minutes  Gait Trainin minutes  Therapeutic Activity: 10 minutes  Therapeutic Exercise: 0 minutes   Neuromuscular Re-education: 0 minutes

## 2023-09-05 NOTE — CM/SW NOTE
Prior auth for the voriconazole phoned to [employer] at [phone]. Turnaround time for this expedited request is 24 hrs. nany VOGT, notified.   Diego Blair RN, 09/05/23, 3:38 PM

## 2023-09-05 NOTE — CM/SW NOTE
Tubed script for the Voriconazole BID to [facility] who said it needs a prior-auth [phone]. Asked JADYN Carolina to assist with this PA.

## 2023-09-05 NOTE — PROGRESS NOTES
[facility] Crisis Case Management on consult for concerns of depression, PHQ-4 = 0. During evaluation, patient was cooperative, and insightful, A&Ox4. Patient reports that he has been \"feeling down because we are filing for divorce\"; patient reported that during his hospital stay for medical issues, his wife stated that she would like to separate which has been stress-inducing for him. Patient states \"this is the right decision\"- patient stated that he does not have mental health concerns at this time and his negative feelings were situational. Patient was provided resources for psychiatry and counseling that are in-network with insurance; patient declined assistance with making an appointment and denied any additional concerns or need for resources.

## 2023-09-05 NOTE — PROGRESS NOTES
09/04/23 1900   Mobility   O2 walk?  Yes   SPO2% on Room Air at Rest 93   SPO2% Ambulation on Room Air 90

## 2023-09-05 NOTE — DISCHARGE SUMMARY
[facility]  DISCHARGE SUMMARY     Carmelia Klinefelter Patient Status:  Inpatient    1970 MRN [id number]   [facility] 5NW-A Attending No att. providers found   Hosp Day # 23 PCP None Pcp     Date of Admission: 2023  Date of Discharge:  2023     Discharge Disposition: [facility]    Discharge Diagnosis:    #Acute hypoxic respiratory failure 2/2 Large left-sided necrotizing pneumonia and loculated effusion due to Blastomycosis  -on RA  -Pulmonary following  - IS   -S/P Chest tube  with copious output, chest tube out on 2023 per pulmonary  -Antifungal per ID   -tapering steroids per pulmonary    #Necrotizing pneumonia due to Blastomycosis-   - failed outpatient levaquin   - legionella Uag, RVP neg  - sputum cx neg   - fungal sputum cx + blastomyces   - per ID: Histo Uag + but histo ag can cross-react with antigens of blasto; Histo abs neg; Blasto abs and ag negative. - steroids and antifungal   - Quant TB (+), AFB negative x3, suspect latent TB per pulmonary and ID.    -HIV nonreactive     #Pleural effusion and empyema- as above   -improved      #new Afib RVR- new dx. Converted to SR  -cardio following    -eliquis -> dose reduced due to d/d interaction with antifungal     #lactic acidosis- resolved      #Hyponatremia, improving      #Diabetes mellitus type 2 with steroid-induced hyperglycemia    #ZORAIDA, resolved     # Bilateral pitting pedal edema/Anasarca       History of Present Illness:  leanna Pack is a 52year old male with past medical history of diabetes, COVID-pneumonia in 2020 who presents to the ED for dyspnea. He was diagnosed with left lower lobe pneumonia 5 days ago when he came to the ED. He was sent home on levofloxacin. He has been taking medications as prescribed but continues to have cough and worsening shortness of breath. He also developed pleuritic chest pain and fevers at home.   He denies any nausea, vomiting, abdominal pain, leg pain, leg swelling, headaches, dizziness. Brief Synopsis:   Patient admitted and found to have necrotizing Pneumonia due to balstomycosis. Patient treated with antifungal and eventually weaned off of O2. He has done well and will continue antifungal for at least 6 months per ID recommendation. Lace+ Score: 67  59-90 High Risk  29-58 Medium Risk  0-28   Low Risk  Patient was referred to the [facility]. TCM Follow-Up Recommendation:  LACE > 58: High Risk of readmission after discharge from the hospital.    Consultants:  Pulmonary, ID, renal,cardiology     Discharge Medication List:     Discharge Medications        START taking these medications        Instructions Prescription details   apixaban 2.5 MG Tabs  Commonly known as: Eliquis      Take 1 tablet (2.5 mg total) by mouth 2 (two) times daily. Quantity: 60 tablet  Refills: 3     dilTIAZem HCl ER Beads 120 MG Cp24  Commonly known as: TIAZAC      Take 1 capsule (120 mg total) by mouth daily. Quantity: 30 capsule  Refills: 11     itraconazole 100 MG Caps  Commonly known as: Sporanox      Take 2 capsules (200 mg total) by mouth daily. Take 2 caps three times a day for 3 days, then switch to 2 caps twice per day   Stop taking on: October 6, 2023  Quantity: 60 capsule  Refills: 0     predniSONE 20 MG Tabs  Commonly known as: Deltasone      Tae 2 tablets on days 1-3, take one tablet on days 4-7   Quantity: 10 tablet  Refills: 0            CONTINUE taking these medications        Instructions Prescription details   albuterol 108 (90 Base) MCG/ACT Aers  Commonly known as: Ventolin HFA      Inhale 2 puffs into the lungs every 4 (four) hours as needed for Wheezing. Stop taking on: September 8, 2023  Quantity: 1 each  Refills: 0     Farxiga 10 MG Tabs  Generic drug: dapagliflozin      Take 1 tablet (10 mg total) by mouth daily.    Refills: 0     glipiZIDE 5 MG Tabs  Commonly known as: Glucotrol      Take 1 tablet (5 mg total) by mouth every morning before breakfast.   Refills: 0     ibuprofen 600 MG Tabs  Commonly known as: Motrin      Take 1 tablet (600 mg total) by mouth every 8 (eight) hours as needed for Pain or Fever. Quantity: 30 tablet  Refills: 0     metFORMIN 500 MG Tabs  Commonly known as: Glucophage      Take 1 tablet (500 mg total) by mouth 2 (two) times daily with meals.    Refills: 0               Where to Get Your Medications        These medications were sent to [facility] [address] [facility] [address], [facility] [address], [address] [facility] [address]      Phone: [phone]   apixaban 2.5 MG Tabs  dilTIAZem HCl ER Beads 120 MG Cp24  predniSONE 20 MG Tabs       Please  your prescriptions at the location directed by your doctor or nurse    Bring a paper prescription for each of these medications  itraconazole 100 MG Caps         [place]    Follow-up appointment:   Amanda Ross MD  [address] [facility]  [phone]    Follow up in 1 week(s)  Office will call you for follow up appt    Steve Rod MD  [address] [place] [address]  [address] [phone]    Follow up in 2 week(s)  Office will call you for follow up appt    Marietta Vazquez[address]   [phone]    Schedule an appointment as soon as possible for a visit in 2 week(s)  Please call to make an appointment to be seen in 2 weeks    Jannet Beltran MD  [address] [facility] [address]  [place] [facility] [phone]    Follow up in 2 week(s)      Appointments for Next 30 Days 9/7/2023 - 10/7/2023      None            Vital signs:       Physical Exam:    General: No acute distress   Lungs: clear to auscultation  Cardiovascular: S1, S2  Abdomen: Soft NTND    -----------------------------------------------------------------------------------------------  PATIENT DISCHARGE INSTRUCTIONS: See electronic chart    Charlie Marino MD    Total time spent on discharge plannin minutes     The [facility] makes medical notes like these available to patients in the interest of transparency. Please be advised this is a medical document. Medical documents are intended to carry relevant information, facts as evident, and the clinical opinion of the practitioner. The medical note is intended as peer to peer communication and may appear blunt or direct. It is written in medical language and may contain abbreviations or verbiage that are unfamiliar.

## 2023-09-05 NOTE — PLAN OF CARE
Pt A/O x4, on room air, NSR on tele. BLE edema, thigh high JOE hose on, diuretics given x1. Denies pain. Up SBA, worked with PT/OT.  DC orders received    Problem: Patient/Family Goals  Goal: Patient/Family Long Term Goal  Description: Patient's Long Term Goal: to be dsicharged    Interventions:  - follow MD orders  - See additional Care Plan goals for specific interventions  Outcome: Progressing     Problem: Patient/Family Goals  Goal: Patient/Family Short Term Goal  Description: Patient's Short Term Goal:   08/14NOC: NPO @oooo for thoracentesis tomorrow, pain management  08/17NOC: get sleep and cough less  8/17 AM: Sepsis protocol   8/18: feel better , control cough   8/19 noc; reduce fever  8/19am: tolerate O2 walk   8/19 noc: improve 02 needs  8/20am: get chest tube today   8/20 noc: Wean 02 needs   8/21 AM: Chest tube, wean O2  8/22 AM: Wean O2  09/02/2023 - WEAN o2  09/03 am - WEAN O2  09/03/2023 - WEAN O2  9/4 AM - O2 walk  9/4 NOC: sleep, control cough  9/5: DC planning    Interventions:   - PRN pain meds  -PRN cough meds  - See additional Care Plan goals for specific interventions  Outcome: Progressing

## 2023-09-06 VITALS
DIASTOLIC BLOOD PRESSURE: 66 MMHG | WEIGHT: 273.56 LBS | OXYGEN SATURATION: 91 % | HEART RATE: 86 BPM | TEMPERATURE: 98 F | BODY MASS INDEX: 42.93 KG/M2 | SYSTOLIC BLOOD PRESSURE: 136 MMHG | HEIGHT: 67 IN | RESPIRATION RATE: 18 BRPM

## 2023-09-06 LAB
GLUCOSE BLD-MCNC: 156 MG/DL (ref 70–99)
GLUCOSE BLD-MCNC: 168 MG/DL (ref 70–99)

## 2023-09-06 RX ORDER — FUROSEMIDE 10 MG/ML
20 INJECTION INTRAMUSCULAR; INTRAVENOUS ONCE
Status: COMPLETED | OUTPATIENT
Start: 2023-09-06 | End: 2023-09-06

## 2023-09-06 RX ORDER — PREDNISONE 20 MG/1
TABLET ORAL
Qty: 10 TABLET | Refills: 0 | Status: SHIPPED | OUTPATIENT
Start: 2023-09-06

## 2023-09-06 RX ORDER — ITRACONAZOLE 100 MG/1
200 CAPSULE ORAL DAILY
Qty: 60 CAPSULE | Refills: 0 | Status: SHIPPED | OUTPATIENT
Start: 2023-09-06 | End: 2023-10-06

## 2023-09-06 RX ORDER — ITRACONAZOLE 100 MG/1
200 CAPSULE ORAL DAILY
Qty: 60 CAPSULE | Refills: 0 | Status: SHIPPED
Start: 2023-09-06 | End: 2023-09-06

## 2023-09-06 NOTE — PLAN OF CARE
Pt A&OX4. Glasses. On RA sating WNL. . PRN cough meds given. See MAR. On tele. NSR. On eliquis. No c/o pain at this time. QID accuchecks. IV antifungal. Continent. Up standby. BRP. Pt updated on plan of care and verbalized understanding. Call light within reach. All needs met at this time. Will follow.       Problem: Patient/Family Goals  Goal: Patient/Family Long Term Goal  Description: Patient's Long Term Goal: to be dsicharged    Interventions:  - follow MD orders  - See additional Care Plan goals for specific interventions  Outcome: Progressing  Goal: Patient/Family Short Term Goal  Description: Patient's Short Term Goal:   08/14NOC: NPO @oooo for thoracentesis tomorrow, pain management  08/17NOC: get sleep and cough less  8/17 AM: Sepsis protocol   8/18: feel better , control cough   8/19 noc; reduce fever  8/19am: tolerate O2 walk   8/19 noc: improve 02 needs  8/20am: get chest tube today   8/20 noc: Wean 02 needs   8/21 AM: Chest tube, wean O2  8/22 AM: Wean O2  09/02/2023 - WEAN o2  09/03 am - WEAN O2  09/03/2023 - WEAN O2  9/4 AM - O2 walk  9/4 NOC: sleep, control cough  9/5: DC planning  9/5 NOC: Sleep, remain comfortable  Interventions:   - PRN pain meds  -PRN cough meds  - See additional Care Plan goals for specific interventions  Outcome: Progressing     Problem: RESPIRATORY - ADULT  Goal: Achieves optimal ventilation and oxygenation  Description: INTERVENTIONS:  - Assess for changes in respiratory status  - Assess for changes in mentation and behavior  - Position to facilitate oxygenation and minimize respiratory effort  - Oxygen supplementation based on oxygen saturation or ABGs  - Provide Smoking Cessation handout, if applicable  - Encourage broncho-pulmonary hygiene including cough, deep breathe, Incentive Spirometry  - Assess the need for suctioning and perform as needed  - Assess and instruct to report SOB or any respiratory difficulty  - Respiratory Therapy support as indicated  - Manage/alleviate anxiety  - Monitor for signs/symptoms of CO2 retention  Outcome: Progressing     Problem: METABOLIC/FLUID AND ELECTROLYTES - ADULT  Goal: Glucose maintained within prescribed range  Description: INTERVENTIONS:  - Monitor Blood Glucose as ordered  - Assess for signs and symptoms of hyperglycemia and hypoglycemia  - Administer ordered medications to maintain glucose within target range  - Assess barriers to adequate nutritional intake and initiate nutrition consult as needed  - Instruct patient on self management of diabetes  Outcome: Progressing  Goal: Electrolytes maintained within normal limits  Description: INTERVENTIONS:  - Monitor labs and rhythm and assess patient for signs and symptoms of electrolyte imbalances  - Administer electrolyte replacement as ordered  - Monitor response to electrolyte replacements, including rhythm and repeat lab results as appropriate  - Fluid restriction as ordered  - Instruct patient on fluid and nutrition restrictions as appropriate  Outcome: Progressing  Goal: Hemodynamic stability and optimal renal function maintained  Description: INTERVENTIONS:  - Monitor labs and assess for signs and symptoms of volume excess or deficit  - Monitor intake, output and patient weight  - Monitor urine specific gravity, serum osmolarity and serum sodium as indicated or ordered  - Monitor response to interventions for patient's volume status, including labs, urine output, blood pressure (other measures as available)  - Encourage oral intake as appropriate  - Instruct patient on fluid and nutrition restrictions as appropriate  Outcome: Progressing

## 2023-09-06 NOTE — PROGRESS NOTES
NURSING DISCHARGE NOTE    Discharged Home via Wheelchair. Accompanied by Family member  Belongings Taken by patient/family. Patient picked up by his sister who provided transportation to DC home. Patient verbalized understanding of DC instructions.

## 2023-09-06 NOTE — CM/SW NOTE
Asked to assist with submitting PA for itraconazole. PA submitted through CoverMyMeds KEY: [id number], however notified that PA not required as prescription for itraconazole \"within prescribing limits. \" Updated unit SW. Additionally, reviewed cost of script out of pocket on [facility] and the lowest price is at [place]. It will cost $63.99 for a one month supply.     SUDHEER AmbrizN, RN-BC    [id number]

## 2023-09-06 NOTE — PLAN OF CARE
Patient is A/Ox4. VSS. Afebrile. Patient denies pain. Patient is on RA. . Lung sounds diminished. Dry Cough/No SOB. on Tele NSR/A-fib. SCD on. Electrolyte protocol-non cardiac. 1800 ADA Diet/QID Accucheck. Denies any N/V/Diarrhea. Continent both. IV ABX. Patient updated on plan of DC today.     Problem: Patient/Family Goals  Goal: Patient/Family Long Term Goal  Description: Patient's Long Term Goal: to be dsicharged    Interventions:  - follow MD orders  - See additional Care Plan goals for specific interventions  Outcome: Completed  Goal: Patient/Family Short Term Goal  Description: Patient's Short Term Goal:   08/14NOC: NPO @oooo for thoracentesis tomorrow, pain management  08/17NOC: get sleep and cough less  8/17 AM: Sepsis protocol   8/18: feel better , control cough   8/19 noc; reduce fever  8/19am: tolerate O2 walk   8/19 noc: improve 02 needs  8/20am: get chest tube today   8/20 noc: Wean 02 needs   8/21 AM: Chest tube, wean O2  8/22 AM: Wean O2  09/02/2023 - WEAN o2  09/03 am - WEAN O2  09/03/2023 - WEAN O2  9/4 AM - O2 walk  9/4 NOC: sleep, control cough  9/5: DC planning  9/5 NOC: Sleep, remain comfortable  Interventions:   - PRN pain meds  -PRN cough meds  - See additional Care Plan goals for specific interventions  Outcome: Completed     Problem: RESPIRATORY - ADULT  Goal: Achieves optimal ventilation and oxygenation  Description: INTERVENTIONS:  - Assess for changes in respiratory status  - Assess for changes in mentation and behavior  - Position to facilitate oxygenation and minimize respiratory effort  - Oxygen supplementation based on oxygen saturation or ABGs  - Provide Smoking Cessation handout, if applicable  - Encourage broncho-pulmonary hygiene including cough, deep breathe, Incentive Spirometry  - Assess the need for suctioning and perform as needed  - Assess and instruct to report SOB or any respiratory difficulty  - Respiratory Therapy support as indicated  - Manage/alleviate anxiety  - Monitor for signs/symptoms of CO2 retention  Outcome: Completed     Problem: METABOLIC/FLUID AND ELECTROLYTES - ADULT  Goal: Glucose maintained within prescribed range  Description: INTERVENTIONS:  - Monitor Blood Glucose as ordered  - Assess for signs and symptoms of hyperglycemia and hypoglycemia  - Administer ordered medications to maintain glucose within target range  - Assess barriers to adequate nutritional intake and initiate nutrition consult as needed  - Instruct patient on self management of diabetes  Outcome: Completed  Goal: Electrolytes maintained within normal limits  Description: INTERVENTIONS:  - Monitor labs and rhythm and assess patient for signs and symptoms of electrolyte imbalances  - Administer electrolyte replacement as ordered  - Monitor response to electrolyte replacements, including rhythm and repeat lab results as appropriate  - Fluid restriction as ordered  - Instruct patient on fluid and nutrition restrictions as appropriate  Outcome: Completed  Goal: Hemodynamic stability and optimal renal function maintained  Description: INTERVENTIONS:  - Monitor labs and assess for signs and symptoms of volume excess or deficit  - Monitor intake, output and patient weight  - Monitor urine specific gravity, serum osmolarity and serum sodium as indicated or ordered  - Monitor response to interventions for patient's volume status, including labs, urine output, blood pressure (other measures as available)  - Encourage oral intake as appropriate  - Instruct patient on fluid and nutrition restrictions as appropriate  Outcome: Completed

## 2023-09-06 NOTE — CM/SW NOTE
Notified by Michael Mcmillan from [address] she has spoken with the supervisor at [facility] who will reach out to her with a determination regarding PA for Voriconazole. Notified by RN she has spoken with pt who was on the phone with his insurance plan, and pt stated his insurance denied coverage for Voriconazole. RN stated pt's insurance provided alternative options that should be covered and she has paged ID with alternative options. Per ID note, Itraconazole has been ordered since Voriconazole is not covered by insurance. SIN messaged Michael Mcmillan from [address] to inquire if Itraconazole requires a PA. Addendum: SIN called [address] script was sent to to inquire if PA is needed ([phone]). SIN spoke with pharmacist Selma Candelario who stated he did not receive script. SIN faxed Itraconazole script to [address] at ([phone]) and requested to be notified if PA is needed or not needed. Selma Candelario stated he will check coverage for medication once received and reach out to SW.     3:10pm - SIN attempted to call [address] ([phone]) four times and was disconnected each time before speaking to pharmacist.     Sharyle Faes called Meds to [facility] and spoke with Brit Beltran. Brit Beltran requested script faxed to OP pharmacy at [phone]. SIN faxed script for Itraconazole and requested to check if a PA is needed. SIN received message from [place] at [facility] stating the OP pharmacy does not have Itraconazole in stock. RN stated pt informed her his [address] has Itraconazole in stock. SIN called [address] in [place] ([phone]) and spoke with pharmacist Selma Candelario. Selma Candelario stated he has not received the script, but if the script was faxed he has 66 other scripts pending. SIN confirmed pt's  and phone number with pharmacist, pharmacist stated he has not received the script electronically or faxed.  SIN informed pharmacist pt informed staff he received phone calls from his pharmacy stating his medication is ready and in stock. Pharmacist stated pt may have received calls regarding his other medications as he has numerous scripts sent to this [address], but he has not received the Itraconazole script. SW provided pharmacist with Itraconazole script information over the phone to check cost/PA for Itraconazole. Pharmacist stated pt has a $15 co-pay after insurance for Itraconazole, but the pharmacy does not have the medication in stock. Pharmacist stated prior authorization is not needed. SW informed pharmacist pt is discharging today and to order medication. Pharmacist stated he will order Itraconazole and it will be delivered/ready tomorrow. 4pm - Notified by Bobby Carmona at [facility] PA is needed for Itraconazole due to dosing (two caps three times a day for three days then switch to 2 caps twice per day) list on script. Relayed to CM. CM informed SW request was started in cover my meds and PA is not needed. Aminata Booker at [facility]. Pt discharged today.      RO Olvera  Discharge Planner

## 2023-09-07 ENCOUNTER — PATIENT OUTREACH (OUTPATIENT)
Dept: CASE MANAGEMENT | Age: 53
End: 2023-09-07

## 2023-09-07 ENCOUNTER — TELEPHONE (OUTPATIENT)
Dept: INTERNAL MEDICINE CLINIC | Facility: CLINIC | Age: 53
End: 2023-09-07

## 2023-09-07 NOTE — PROGRESS NOTES
LM for pt to call [facility] for TCM since discharge. NCM phone number was provided for pt to call back. [facility] contact information was provided for pt to call back as well.

## 2023-09-07 NOTE — PROGRESS NOTES
LM for pt to call [facility] for TCM since discharge. NCM phone number was provided for pt to call back. [facility] contact information was provided for pt to call back as well. Pt had an appt with [facility] this morning but did not show.

## 2023-09-12 ENCOUNTER — OFFICE VISIT (OUTPATIENT)
Dept: ENDOCRINOLOGY CLINIC | Facility: CLINIC | Age: 53
End: 2023-09-12
Payer: COMMERCIAL

## 2023-09-12 VITALS
OXYGEN SATURATION: 93 % | HEART RATE: 102 BPM | RESPIRATION RATE: 20 BRPM | BODY MASS INDEX: 44 KG/M2 | SYSTOLIC BLOOD PRESSURE: 130 MMHG | DIASTOLIC BLOOD PRESSURE: 78 MMHG | WEIGHT: 280 LBS

## 2023-09-12 DIAGNOSIS — E66.01 CLASS 3 SEVERE OBESITY WITH SERIOUS COMORBIDITY IN ADULT, UNSPECIFIED BMI, UNSPECIFIED OBESITY TYPE (HCC): ICD-10-CM

## 2023-09-12 DIAGNOSIS — I10 ESSENTIAL HYPERTENSION: ICD-10-CM

## 2023-09-12 DIAGNOSIS — I48.0 PAROXYSMAL ATRIAL FIBRILLATION (HCC): ICD-10-CM

## 2023-09-12 DIAGNOSIS — E11.65 TYPE 2 DIABETES MELLITUS WITH HYPERGLYCEMIA, WITHOUT LONG-TERM CURRENT USE OF INSULIN (HCC): Primary | ICD-10-CM

## 2023-09-12 PROBLEM — R07.9 ACUTE CHEST PAIN: Status: RESOLVED | Noted: 2020-06-07 | Resolved: 2023-09-12

## 2023-09-12 PROBLEM — Z79.4 TYPE 2 DIABETES MELLITUS WITHOUT COMPLICATION, WITH LONG-TERM CURRENT USE OF INSULIN (HCC): Status: RESOLVED | Noted: 2023-08-14 | Resolved: 2023-09-12

## 2023-09-12 PROBLEM — E66.813 CLASS 3 SEVERE OBESITY WITH SERIOUS COMORBIDITY IN ADULT: Status: ACTIVE | Noted: 2023-09-12

## 2023-09-12 PROBLEM — E87.3 ALKALOSIS: Status: RESOLVED | Noted: 2023-08-29 | Resolved: 2023-09-12

## 2023-09-12 PROBLEM — R09.02 HYPOXIA: Status: RESOLVED | Noted: 2020-06-07 | Resolved: 2023-09-12

## 2023-09-12 PROBLEM — N17.9 AKI (ACUTE KIDNEY INJURY) (HCC): Status: RESOLVED | Noted: 2023-08-24 | Resolved: 2023-09-12

## 2023-09-12 PROBLEM — R60.1 ANASARCA: Status: RESOLVED | Noted: 2023-08-26 | Resolved: 2023-09-12

## 2023-09-12 PROBLEM — N17.9 AKI (ACUTE KIDNEY INJURY): Status: RESOLVED | Noted: 2023-08-24 | Resolved: 2023-09-12

## 2023-09-12 PROBLEM — J86.9 EMPYEMA (HCC): Status: RESOLVED | Noted: 2023-08-14 | Resolved: 2023-09-12

## 2023-09-12 PROBLEM — E87.70 HYPERVOLEMIA: Status: RESOLVED | Noted: 2023-08-29 | Resolved: 2023-09-12

## 2023-09-12 PROBLEM — E11.9 TYPE 2 DIABETES MELLITUS WITHOUT COMPLICATION, WITH LONG-TERM CURRENT USE OF INSULIN (HCC): Status: RESOLVED | Noted: 2023-08-14 | Resolved: 2023-09-12

## 2023-09-12 PROBLEM — E87.1 HYPONATREMIA: Status: RESOLVED | Noted: 2023-08-14 | Resolved: 2023-09-12

## 2023-09-12 PROBLEM — E66.813 CLASS 3 SEVERE OBESITY WITH SERIOUS COMORBIDITY IN ADULT (HCC): Status: ACTIVE | Noted: 2023-09-12

## 2023-09-12 PROBLEM — J96.01 ACUTE RESPIRATORY FAILURE WITH HYPOXIA (HCC): Status: RESOLVED | Noted: 2023-08-18 | Resolved: 2023-09-12

## 2023-09-12 LAB
CREAT UR-SCNC: 36 MG/DL
GLUCOSE BLOOD: 294
MICROALBUMIN UR-MCNC: 1.12 MG/DL
MICROALBUMIN/CREAT 24H UR-RTO: 31.1 UG/MG (ref ?–30)
TEST STRIP LOT #: 230 NUMERIC

## 2023-09-12 PROCEDURE — 3075F SYST BP GE 130 - 139MM HG: CPT | Performed by: NURSE PRACTITIONER

## 2023-09-12 PROCEDURE — 82570 ASSAY OF URINE CREATININE: CPT | Performed by: NURSE PRACTITIONER

## 2023-09-12 PROCEDURE — 3078F DIAST BP <80 MM HG: CPT | Performed by: NURSE PRACTITIONER

## 2023-09-12 PROCEDURE — 82947 ASSAY GLUCOSE BLOOD QUANT: CPT | Performed by: NURSE PRACTITIONER

## 2023-09-12 PROCEDURE — 82043 UR ALBUMIN QUANTITATIVE: CPT | Performed by: NURSE PRACTITIONER

## 2023-09-12 PROCEDURE — 99215 OFFICE O/P EST HI 40 MIN: CPT | Performed by: NURSE PRACTITIONER

## 2023-09-12 RX ORDER — SEMAGLUTIDE 0.68 MG/ML
INJECTION, SOLUTION SUBCUTANEOUS
Qty: 1 EACH | Refills: 12 | COMMUNITY
Start: 2023-09-12

## 2023-09-12 RX ORDER — SEMAGLUTIDE 0.68 MG/ML
0.5 INJECTION, SOLUTION SUBCUTANEOUS WEEKLY
Qty: 3 ML | Refills: 0 | Status: SHIPPED | OUTPATIENT
Start: 2023-09-12

## 2023-09-13 ENCOUNTER — TELEPHONE (OUTPATIENT)
Dept: ENDOCRINOLOGY CLINIC | Facility: CLINIC | Age: 53
End: 2023-09-13

## 2023-09-13 DIAGNOSIS — E11.65 TYPE 2 DIABETES MELLITUS WITH HYPERGLYCEMIA, WITHOUT LONG-TERM CURRENT USE OF INSULIN (HCC): Primary | ICD-10-CM

## 2023-09-13 RX ORDER — BLOOD SUGAR DIAGNOSTIC
STRIP MISCELLANEOUS
Qty: 300 STRIP | Refills: 3 | Status: SHIPPED | OUTPATIENT
Start: 2023-09-13

## 2023-09-15 DIAGNOSIS — B40.9 BLASTOMYCOSIS: Primary | ICD-10-CM

## 2023-09-15 LAB — M TB CMPLX RRNA SPEC QL PROBE: NOT DETECTED

## 2023-09-18 LAB
M AVIUM COMPLEX: POSITIVE
M TUBERCULOSIS COMPLEX: NEGATIVE

## 2023-09-22 ENCOUNTER — DIABETIC EDUCATION (OUTPATIENT)
Dept: ENDOCRINOLOGY CLINIC | Facility: CLINIC | Age: 53
End: 2023-09-22
Payer: COMMERCIAL

## 2023-09-22 VITALS — BODY MASS INDEX: 46 KG/M2 | WEIGHT: 293.81 LBS

## 2023-09-22 DIAGNOSIS — E11.65 TYPE 2 DIABETES MELLITUS WITH HYPERGLYCEMIA, WITHOUT LONG-TERM CURRENT USE OF INSULIN (HCC): ICD-10-CM

## 2023-09-22 DIAGNOSIS — E11.9 TYPE 2 DIABETES MELLITUS WITHOUT COMPLICATION, WITHOUT LONG-TERM CURRENT USE OF INSULIN (HCC): Primary | ICD-10-CM

## 2023-09-22 NOTE — PROGRESS NOTES
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 Discharged 23. Currently on Prednisone- pt reports feeling hungry and increased blood sugars. Also was on diltiazem but he noticed a large amount of swelling in his lower extremities. He has stopped taking diltiazem, and his cardiologist switched to metoprolol. Weight is up 13 lbs in 10 days. Feet examined and left foot is developing a red chaffed area on the top of the foot. Wt Readings from Last 6 Encounters:  23 : 293 lb 12.8 oz  23 : 280 lb  23 : 273 lb 9.5 oz  23 : 293 lb 3.4 oz  23 : 295 lb  20 : (!) 304 lb       Lab Results   Component Value Date    A1C 11.7 (H) 2023    A1C 10.1 (H) 2020     Ozempic 0.5mg, reviewed the sample pen he received will last 6 weeks   Prednisone- feels dizzy   Farxiga 10mg once daily   Metformin 500mg 1 tab twice daily   Decrease Glpizide 5mg daily in AM : 1/2 tab in morning    Current diabetes medications:  Oral medication: Metformin 500 mg, twice daily and Glipizide 5 mg, daily   Injectables: Ozempic 0.25 mg x 4 weeks and then increase to 0.5 mg  Injection sites: Abdomin        Taking correctly: No:   Pt is taking Metformin \"as needed\" when blood sugars are high in the morning. We discussed how metformin works and reviewed dosing instructions. He notes that this medication can give him GI upset but he is not experiencing that at this time.        Glucose testing at home:   Blood Glucose Meter:  Meter Brand: Accucheck  Currently testing blood glucose: Yes  Frequency of testin times/day, fasting and before meals  BG results: per patient report   FB mg/dl Usually (124-130 mg)   Pre Meal: 130-140 mg/dl (Pre-dinner)            Diet History:  Usually eats 3 meals and 1 snacks a day  (a.m.) Eggs, oatmeal, protein drink  (mid-day) No carb lunch  (p.m.) Pizza, Frozen vegetables, cut down carbs, salad, grilled chicken, salmon  (Beverages) Water    Currently exercising: No, lower extremity edema      Education:     Diabetes Overview  Reviewed diabetes pathophysiology, Discussed benefits of blood glucose control and blood glucose targets    Healthy Eating  Reviewed carbohydrate counting and label reading., Reviewed heart healthy diet (low fat, low saturated fat, high fiber, reduced sodium)    Being Active  Benefits and effect of activity on BG discussed. Reviewed when to call MD and benefits of goal setting. Monitoring  Reinforced glucose monitoring schedules: two times daily    Taking Medication  Reviewed medication use, action, timing, and side effects. Injectables: Site selection, rotation, action, timing reviewed. Reducing Risk  Reviewed overview of complications including: foot care    Health Coping  Family involvement/support encouraged  Depression and diabetes reviewed. Recommendations:     Practice healthful eating patterns, emphasizing a variety of nutrient dense foods in appropriate portion sizes. Take medications as prescribed  Practice carbohydrate counting and label reading    Blood Glucose Goals  Blood sugar goals: less than 130 mg/dl in the morning and less than 180 mg/dl 2 hours after meals. Keep glucose tabs or fast acting carbohydrates with you for treatment of lows; for blood glucose levels less than 70 mg/dl, take 15 grams of fast acting carbohydrates (3-4 glucose tabs, 4 ounces of juice, or as discussed). Retest in 15 min. If not above 70 mg/dl, retreat. Patients personal goals:     Lower leg and foot edema:   Leave shoe off of your foot as much as possible.    Use a boot (medical walking shoe) for a shoe,   Keep feet dry, lightly moisturize to reduce cracks in the skin, avoid abrasion, bring extra dry cotton socks on work days  Keep feet elevated when able  Avoid walking barefoot and any sharp hazards on the ground  Follow up with cardiology for Edema if the swelling does not reduce after switching medications. Follow up:   Future Appointments   Date Time Provider Brandyn Mcpherson   10/4/2023 10:00 AM Alex Phelps RD [facility]   11/9/2023  7:30 AM Marion Rao, APRN [facility]       Patient verbalized understanding and has no further questions at this time.     Freddy Kincaid, RDN, LDN, 1 [facility]  Certified Diabetes Care and   Registered Dietitian

## 2023-09-22 NOTE — PATIENT INSTRUCTIONS
Gabe Aviles, it was a pleasure meeting you today. Below is a summary of our visit. Recommendations:     Practice healthful eating patterns, emphasizing a variety of nutrient dense foods in appropriate portion sizes. Take medications as prescribed  Practice carbohydrate counting and label reading    Blood Glucose Goals  Blood sugar goals: less than 130 mg/dl in the morning and less than 180 mg/dl 2 hours after meals. Keep glucose tabs or fast acting carbohydrates with you for treatment of lows; for blood glucose levels less than 70 mg/dl, take 15 grams of fast acting carbohydrates (3-4 glucose tabs, 4 ounces of juice, or as discussed). Retest in 15 min. If not above 70 mg/dl, retreat. Patients personal goals:     Lower leg and foot edema:   Leave shoe off of your foot as much as possible. Use a boot (medical walking shoe) for a shoe,   Keep feet dry, lightly moisturize to reduce cracks in the skin, avoid abrasion, bring extra dry cotton socks on work days  Keep feet elevated when able  Avoid walking barefoot and any sharp hazards on the ground  Follow up with cardiology for Edema if the swelling does not reduce after switching medications. Follow up:   Future Appointments   Date Time Provider Brandyn Mcpherson   10/4/2023 10:00 AM Gabriel Cardenas RD [facility]   11/9/2023  7:30 AM JIMENEZ Keane [facility]       Please call the Diabetes Center with any questions or concerns [phone].     Neto Burgos, MONICON, LDN, 1 [facility]  Certified Diabetes Care and   Registered Dietitian

## 2023-09-23 ENCOUNTER — TELEPHONE (OUTPATIENT)
Dept: ENDOCRINOLOGY CLINIC | Facility: CLINIC | Age: 53
End: 2023-09-23

## 2023-09-23 NOTE — TELEPHONE ENCOUNTER
Pt saw Teresita Shelley RD on 9- for diabetes check in   Reports 13# weight gain in 10 days and skin change to top of RO foot  Pt needs to contact cardiology office and f/u w PCP   If any CP, SOB to ER

## 2023-09-25 NOTE — TELEPHONE ENCOUNTER
Call with patient, denies chest pain, states lower extremity swelling has improved since stopping the diltiazem. States he is now taking metoprolol per cardiology, who he saw last Tuesday. Advised pt to go to ER with continued SOB/ weight gain. Also advised pt to establish with PCP, pt reports his PCP retired recently but agreed to do so. Pt had no further concerns at this time.

## 2023-10-04 ENCOUNTER — DIABETIC EDUCATION (OUTPATIENT)
Dept: ENDOCRINOLOGY CLINIC | Facility: CLINIC | Age: 53
End: 2023-10-04
Payer: COMMERCIAL

## 2023-10-04 VITALS — BODY MASS INDEX: 46 KG/M2 | WEIGHT: 290.81 LBS

## 2023-10-04 DIAGNOSIS — E11.65 TYPE 2 DIABETES MELLITUS WITH HYPERGLYCEMIA, WITHOUT LONG-TERM CURRENT USE OF INSULIN (HCC): Primary | ICD-10-CM

## 2023-10-04 LAB
M AVIUM COMPLEX: POSITIVE
M TUBERCULOSIS COMPLEX: NEGATIVE

## 2023-10-04 PROCEDURE — G0108 DIAB MANAGE TRN  PER INDIV: HCPCS

## 2023-10-04 NOTE — PROGRESS NOTES
Carlos Quiroga  : 1970 was seen for Diabetic Education, follow up visit:    Date: 10/4/2023  Referring Provider: JIMENEZ Sims    Start time: 10 am End time: 10:35 am    Assessment:     Reason for visit: Visit for a follow-up from last appointment on 23. Pt was switched from diltiazem to metoprolol but felt he still was experienced swelling. Stopped taking both meds due to swelling, he also felt metoprolol was giving hallucinations. Pt's right foot looks better, less red and slightly less swollen. Dry patchy skin on top. Pt advised to follow with a podiatrist for foot and nail care. Pt also stopped his Itraconazole on  because he felt it was also contributed to his swelling. Weight is down 3lbs from last visit. Wt Readings from Last 6 Encounters:  10/04/23 : 290 lb 12.8 oz  23 : 293 lb 12.8 oz  23 : 280 lb  23 : 273 lb 9.5 oz  23 : 293 lb 3.4 oz  23 : 295 lb        Lab Results   Component Value Date    A1C 11.7 (H) 2023    A1C 10.1 (H) 2020       Current diabetes medications:  Ozempic 0.5mg (Took his first dose at 0.5mg on 10/3/23). No current side effects. Prednisone- No longer taking  Farxiga 10mg once daily   Metformin 500mg 1 tab twice daily   Decrease Glpizide 5mg daily in AM : 1/2 tab in morning    Taking correctly: Yes      Glucose testing at home:   Blood Glucose Meter:  Meter Brand: FreeStyle reader  Currently testing blood glucose: Yes  Frequency of testin times/day, fasting and before meals  BG results: per glucose meter  FB mg/dl (Range of 149-221 mg/dL)             Diet Changes:  Usually eats 3 meals and 1 snacks a day  Starting to feel a full sensation after eating smaller quantities. Buying frozen chicken breasts, SF jello, quinoa, Ensure - protein drinks, No sugar added fruit cups.     Previous meal recall:  Usually eats 3 meals and 1 snacks a day  (a.m.) Eggs, oatmeal, protein drink  (mid-day) No carb lunch  (p.m.) Pizza, Frozen vegetables, cut down carbs, salad, grilled chicken, salmon  (Beverages) Water     Currently exercising: No, lower extremity edema      Education:     Diabetes Overview  Reviewed diabetes pathophysiology, Discussed benefits of blood glucose control and blood glucose targets    Healthy Eating  Reviewed basic nutrition concepts for diabetes management, MyPlate. , Reviewed carbohydrate counting and label reading., Reviewed heart healthy diet (low fat, low saturated fat, high fiber, reduced sodium)    Being Active  Benefits and effect of activity on BG discussed. Reviewed when to call MD and benefits of goal setting. Monitoring  Reinforced glucose monitoring schedules: Fasting and 2 hours after a meal 2x a week. Taking Medication  Reviewed medication use, action, timing, and side effects. Injectables: Site selection, rotation, action, timing reviewed. Reducing Risk  Reviewed overview of complications including: foot care    Health Coping  Family involvement/support encouraged  Depression and diabetes reviewed. Recommendations:     Practice healthful eating patterns, emphasizing a variety of nutrient dense foods in appropriate portion sizes. Monitor carbohydrate intake with the MyPlate method   Take medications as prescribed  Achieve and maintain weight loss goals. Improve glycemic control working towards an A1c of 7.0% or less  Benefiber daily for added fiber intake. Workout geared towards upper body, until lower extremity swelling is improved. Start slow and discuss with your MD before starting a new workout routine. Keep feet clean, dry and elevated when able. Follow up with a podiatrist at your earliest convenience. Blood Glucose Goals  Blood sugar goals: less than 130 mg/dl in the morning and less than 180 mg/dl 2 hours after meals.   Keep glucose tabs or fast acting carbohydrates with you for treatment of lows; for blood glucose levels less than 70 mg/dl, take 15 grams of fast acting carbohydrates (3-4 glucose tabs, 4 ounces of juice, or as discussed). Retest in 15 min. If not above 70 mg/dl, retreat. Patients personal goals:       Make a list of 5-10 easy, carb controlled, portion appropriate meals for Breakfast, Lunch and Dinner. Add a low carb protein drink (Premier protein, Splenda, Glucerna) or other high protein foods for a snack at least once daily. Small frequent meals while taking Ozempic. This will help with intake because Ozempic can make you feel hernández faster. Increase vegetable intake: Add Frozen vegetables/bagged salads to your meals. Follow up: 11/8/2023 John Bowens, [address]      Patient verbalized understanding and has no further questions at this time.     Indira Lizarraga, MONICON, LDN, 1 [facility]  Certified Diabetes Care and   Registered Dietitian

## 2023-10-04 NOTE — PATIENT INSTRUCTIONS
Louis Russell, it was a pleasure seeing you again today. Below is a summary of our visit. Recommendations:     Practice healthful eating patterns, emphasizing a variety of nutrient dense foods in appropriate portion sizes. Monitor carbohydrate intake with the MyPlate method   Take medications as prescribed  Achieve and maintain weight loss goals. Improve glycemic control working towards an A1c of 7.0% or less  Benefiber daily, for added fiber intake. Workout geared towards upper body, until lower extremity swelling is improved. Start slow and discuss with your MD before starting a new workout routine. Keep feet clean, dry and elevated when able. Follow up with a podiatrist at your earliest convenience. Blood Glucose Goals  Blood sugar goals: less than 130 mg/dl in the morning and less than 180 mg/dl 2 hours after meals. Keep glucose tabs or fast acting carbohydrates with you for treatment of lows; for blood glucose levels less than 70 mg/dl, take 15 grams of fast acting carbohydrates (3-4 glucose tabs, 4 ounces of juice, or as discussed). Retest in 15 min. If not above 70 mg/dl, retreat. Patients personal goals:       Make a list of 5-10 easy, carb controlled, portion appropriate meals for Breakfast, Lunch and Dinner, hang this list on your fridge. Add a low carb protein drink (Premier protein, Splenda, Glucerna) or other high protein foods for a snack at least once daily. Small frequent meals while taking Ozempic. This will help with intake because Ozempic can make you feel hernández faster. Increase vegetable intake: Add Frozen vegetables/bagged salads to your meals. Follow up: 11/8/2023 Sasha Salinas, [address]    Please call the Diabetes Center with any questions or concerns [phone].     Stephanie Reddy, RDN, LDN, 1 [facility]  Certified Diabetes Care and   Registered Dietitian

## 2023-10-17 ENCOUNTER — IMMUNIZATION (OUTPATIENT)
Dept: LAB | Age: 53
End: 2023-10-17
Attending: EMERGENCY MEDICINE
Payer: COMMERCIAL

## 2023-10-17 DIAGNOSIS — Z23 NEED FOR VACCINATION: Primary | ICD-10-CM

## 2023-10-17 PROCEDURE — 90480 ADMN SARSCOV2 VAC 1/ONLY CMP: CPT

## 2023-10-25 ENCOUNTER — TELEPHONE (OUTPATIENT)
Dept: ENDOCRINOLOGY CLINIC | Facility: CLINIC | Age: 53
End: 2023-10-25

## 2023-10-25 NOTE — TELEPHONE ENCOUNTER
Pt needs labs updated before next michelle on 11-9-2023   Please remind pt   Non fasting is fine   Urine and blood

## 2023-11-06 NOTE — TELEPHONE ENCOUNTER
Contacted patient states he will get labs done before VV Thursday informed it is important for patient to get A1C done before or appt will be canceled. Offered 7:30am appt for patient states he has meeting at [address] [place] [address] and it is pushing it didn't want to make nurse visit for A1C so informed he can go to any [facility] to get the labs drawn.  Sending [facility] now

## 2023-11-08 ENCOUNTER — LAB ENCOUNTER (OUTPATIENT)
Dept: LAB | Age: 53
End: 2023-11-08
Attending: NURSE PRACTITIONER
Payer: COMMERCIAL

## 2023-11-08 DIAGNOSIS — E11.65 TYPE 2 DIABETES MELLITUS WITH HYPERGLYCEMIA, WITHOUT LONG-TERM CURRENT USE OF INSULIN (HCC): ICD-10-CM

## 2023-11-08 LAB
ALBUMIN SERPL-MCNC: 3.1 G/DL (ref 3.4–5)
ALBUMIN/GLOB SERPL: 0.7 {RATIO} (ref 1–2)
ALP LIVER SERPL-CCNC: 92 U/L
ALT SERPL-CCNC: 16 U/L
ANION GAP SERPL CALC-SCNC: 6 MMOL/L (ref 0–18)
AST SERPL-CCNC: 12 U/L (ref 15–37)
BILIRUB SERPL-MCNC: 0.6 MG/DL (ref 0.1–2)
BUN BLD-MCNC: 13 MG/DL (ref 9–23)
CALCIUM BLD-MCNC: 9.1 MG/DL (ref 8.5–10.1)
CHLORIDE SERPL-SCNC: 102 MMOL/L (ref 98–112)
CHOLEST SERPL-MCNC: 193 MG/DL (ref ?–200)
CO2 SERPL-SCNC: 29 MMOL/L (ref 21–32)
CREAT BLD-MCNC: 1.11 MG/DL
CREAT UR-SCNC: 145 MG/DL
EGFRCR SERPLBLD CKD-EPI 2021: 78 ML/MIN/1.73M2 (ref 60–?)
EST. AVERAGE GLUCOSE BLD GHB EST-MCNC: 171 MG/DL (ref 68–126)
FASTING PATIENT LIPID ANSWER: YES
FASTING STATUS PATIENT QL REPORTED: YES
GLOBULIN PLAS-MCNC: 4.6 G/DL (ref 2.8–4.4)
GLUCOSE BLD-MCNC: 254 MG/DL (ref 70–99)
HBA1C MFR BLD: 7.6 % (ref ?–5.7)
HDLC SERPL-MCNC: 36 MG/DL (ref 40–59)
LDLC SERPL CALC-MCNC: 135 MG/DL (ref ?–100)
MICROALBUMIN UR-MCNC: 4.34 MG/DL
MICROALBUMIN/CREAT 24H UR-RTO: 29.9 UG/MG (ref ?–30)
NONHDLC SERPL-MCNC: 157 MG/DL (ref ?–130)
OSMOLALITY SERPL CALC.SUM OF ELEC: 293 MOSM/KG (ref 275–295)
POTASSIUM SERPL-SCNC: 3.7 MMOL/L (ref 3.5–5.1)
PROT SERPL-MCNC: 7.7 G/DL (ref 6.4–8.2)
SODIUM SERPL-SCNC: 137 MMOL/L (ref 136–145)
TRIGL SERPL-MCNC: 121 MG/DL (ref 30–149)
VLDLC SERPL CALC-MCNC: 22 MG/DL (ref 0–30)

## 2023-11-08 PROCEDURE — 83036 HEMOGLOBIN GLYCOSYLATED A1C: CPT | Performed by: NURSE PRACTITIONER

## 2023-11-08 PROCEDURE — 82043 UR ALBUMIN QUANTITATIVE: CPT | Performed by: NURSE PRACTITIONER

## 2023-11-08 PROCEDURE — 80061 LIPID PANEL: CPT | Performed by: NURSE PRACTITIONER

## 2023-11-08 PROCEDURE — 80053 COMPREHEN METABOLIC PANEL: CPT | Performed by: NURSE PRACTITIONER

## 2023-11-08 PROCEDURE — 82570 ASSAY OF URINE CREATININE: CPT | Performed by: NURSE PRACTITIONER

## 2023-11-08 NOTE — PROGRESS NOTES
Due to COVID-19 ACTION PLAN, the patient's office visit was converted to a video visit. Please note that the following visit was completed using two-way, real-time interactive audio and video communication. Time Spent:  24 min      Carlin Shaw is a 52year old presenting today to establish for type 2 diabetes management. Primary care physician: None Pcp  Most recent  A1C 7.6% ( last A1C  11.7%)     At last appt, ozempic was started and glipizide was stopped. However pt is still taking glipizide (but 1/2 tab daily)   Reviewed other DM labs done at lab     Testing 1 x daily : 150- 154     Feels better from PNA standpoint but developed new dry cough . Sxs occur throughout day and night. Started  nasal saline spray which has helped. Pt states started on ace rx 10-. Admits cough started around same time      Did meet w RD - really focusing on eating better. Has another Diabetes education appt next month.          Diabetes History:  Type 2 DM 2010   Patient has not had hospitalizations for blood sugar issues  denies any history of pancreatitis      Previous DM therapies:  Insulin : 2012: improved trends   Glpiziide 5mg 9-2023 changed to GLP 1       Current DM Regimen:  Farxiga 10mg once daily   Metformin 500mg 1 tab twice daily   Ozempic 0.5mg subcutaneous once weekly   Glipizide 2.5 mg once daily       HGBA1C:    Lab Results   Component Value Date    A1C 7.6 (H) 11/08/2023    A1C 11.7 (H) 08/14/2023    A1C 10.1 (H) 06/07/2020     (H) 11/08/2023     Lab Results   Component Value Date    CHOLEST 193 11/08/2023    TRIG 121 11/08/2023    HDL 36 (L) 11/08/2023     (H) 11/08/2023    MICROALBCREA 29.9 11/08/2023    CREATSERUM 1.11 11/08/2023    EGFRCR 78 11/08/2023    AST 12 (L) 11/08/2023    ALT 16 11/08/2023     No results found for: \"TSH\", \"C9Q\"        DM Complications:  Microvascular:   Neuropathy: no  Retinopathy: no  Nephropathy: no    Macrovascular:  PVD: no  CAD: no  Stroke/CVA: no        Modifying factors:  Medication adherence: yes   Recent steroids, illness or infections ( past 3m): 8-2023 Necrotizing pneumonia    Allergies: Patient has no known allergies. Past Medical History:   Diagnosis Date    Atherosclerosis of coronary artery     hx A fib    Diabetes ([address]     Obesity      No past surgical history on file. Social History     Socioeconomic History    Marital status:    Tobacco Use    Smoking status: Never    Smokeless tobacco: Never   Vaping Use    Vaping Use: Never used   Substance and Sexual Activity    Alcohol use: Not Currently    Drug use: Never     Family History   Problem Relation Age of Onset    Diabetes Father     Diabetes Mother     Diabetes Sister     Diabetes Sister      Current Medication List:   Current Outpatient Medications   Medication Sig Dispense Refill    metFORMIN 500 MG Oral Tab Take 1 tablet (500 mg total) by mouth 2 (two) times daily with meals. 180 tablet 1    Glucose Blood (FREESTYLE PRECISION GLENN TEST) In Vitro Strip Use to test blood sugar three times daily  E11.65  Precsion glenn 300 strip 3    semaglutide (OZEMPIC, 0.25 OR 0.5 MG/DOSE,) 2 MG/3ML Subcutaneous Solution Pen-injector As directed 1 each 12    semaglutide (OZEMPIC, 0.25 OR 0.5 MG/DOSE,) 2 MG/3ML Subcutaneous Solution Pen-injector Inject 0.5 mg into the skin once a week. 3 mL 0    predniSONE 20 MG Oral Tab Tae 2 tablets on days 1-3, take one tablet on days 4-7 10 tablet 0    apixaban 2.5 MG Oral Tab Take 1 tablet (2.5 mg total) by mouth 2 (two) times daily. 60 tablet 3    dilTIAZem HCl ER Beads 120 MG Oral Capsule SR 24 Hr Take 1 capsule (120 mg total) by mouth daily. 30 capsule 11    FARXIGA 10 MG Oral Tab Take 1 tablet (10 mg total) by mouth daily. ibuprofen 600 MG Oral Tab Take 1 tablet (600 mg total) by mouth every 8 (eight) hours as needed for Pain or Fever.  30 tablet 0    glipiZIDE 5 MG Oral Tab Take 1 tablet (5 mg total) by mouth every morning before breakfast. 2.5 DM associated review of  symptoms:   Endocrine: Polyuria, polyphagia, polydipsia: No   Neurological: Paresthesias: yes   HEENT: Blurred vision: no  Skin: no rash or wounds  Hematological: Hypoglycemia: no      Review of Systems     LUNGS: denies shortness of breath   + dry  cough     CARDIOVASCULAR: denies chest pain  + LE edema   GI: denies abdominal pain, nausea or diarrhea   : denies dysuria          Physical exam:  There were no vitals taken for this visit. There is no height or weight on file to calculate BMI. Physical Exam     Constitutional: Normal appearance   Cardiovascular: Not assessed   Pulmonary/Chest: Effort normal  Neurological: Alert and oriented . Psychiatric: Normal mood and affect. Assessment/Plan:    Hypertension  Needs ongoing monitoring   Ace induced cough sxs. Stop lisinopril rx  Start Losartan 25mg once daily   If cough sxs do not improve. Fu w PCP or pulm          Dyslipidemia:   Reviewed CMP, Lipids   Advised statin rx is indicated given T2DM dx and age. in past he was on statin but developed myalgias - he cannot recall name and declines starting statin rx today   Has upcoming pX w PCP next week; encouraged pt to discuss w PCP     Type 2 diabetes mellitus with hyperglycemia, without long-term current use of insulin ([facility])  A1C: 7.6% ( last A1C 11.7% )   Last DM center weight: 280  lb     Diabetes control is improving but needs ongoing management and evaluation  given the chronicity of Diabetes, ongoing medication monitoring and risk for complications     Reviewed with patient health impact associated with high glucose trends and the importance of better glucose control to prevent onset /progression of DM complications.    Stop glipizide   Continue   Ozempic 0.5mg subcutaneous once weekly   Farxiga 10mg once daily   Metformin 500mg 1 tab twice daily     Reviewed clinical significance of A1c, adverse effects of suboptimal glucose control, and goals of therapy Reviewed the A1C test, what the value reflects and the goal for the patient. Reminded pt on A1C and blood sugar targets (Fasting < 130 and post prandial <981 ) and complications associated with hyperglycemia and uncontrolled DM (on AVS)   Recommended SMBG 3- x daily if not using CGM (fasting, pre lunch and 2hr after dinner)     Reviewed s/s and treatment of hypoglycemia (on AVS)   Reminded to continue with lifestyle modifications since they have positive impact on diabetes/blood sugars/health (portion control, physical activity, weight loss)   Reinforced timing and adherence with medication, self-monitoring of blood glucose and routine follow up    Recommended for  patient to follow up in Diabetes center to review carb goals, food label reading, and offer further support/guidance with exercise planning and weight loss. The patient is asked to return in 3 - 3 m   but recommended to contact DM clinic sooner if questions or concerns. The patient indicates understanding of these issues and agrees to the plan. No orders of the defined types were placed in this encounter. DM quality  A1C/Blood pressure: as reported above   Nephropathy screenin  not currently on  ace /arb rx. LIPID screenin  not currently on statin rx. Last dilated eye exam: No data recorded Exam shows retinopathy? No data recorded  Last diabetic foot exam: No data recorded      Defer foot exam to follow up appointment     This has been done in good marty to provide continuity of care in the best interest of the provider-patient relationship, due to the on-going public health crisis/national emergency and because of restrictions of visitation. There are limitations of this visit as no or only very limited physical exam could be performed. Every conscious effort was taken to allow for sufficient and adequate time.   This billing visit was spent on reviewing blood sugar trends, DM related labs, medications and decision making. Appropriate medical decision-making and tests are ordered as detailed in the plan of care above. Aarontrevor Shawndaron understands phone or video evaluation is not a substitute for face-to-face examination or emergency care. Patient advised to go to ER or call 911 for worsening symptoms or acute distress.      Kevin Mast, APRN

## 2023-11-09 ENCOUNTER — TELEMEDICINE (OUTPATIENT)
Dept: ENDOCRINOLOGY CLINIC | Facility: CLINIC | Age: 53
End: 2023-11-09
Payer: COMMERCIAL

## 2023-11-09 DIAGNOSIS — E66.01 CLASS 3 SEVERE OBESITY WITH SERIOUS COMORBIDITY IN ADULT, UNSPECIFIED BMI, UNSPECIFIED OBESITY TYPE (HCC): ICD-10-CM

## 2023-11-09 DIAGNOSIS — I10 ESSENTIAL HYPERTENSION: ICD-10-CM

## 2023-11-09 DIAGNOSIS — E11.65 TYPE 2 DIABETES MELLITUS WITH HYPERGLYCEMIA, WITHOUT LONG-TERM CURRENT USE OF INSULIN (HCC): Primary | ICD-10-CM

## 2023-11-09 DIAGNOSIS — E78.5 DYSLIPIDEMIA: ICD-10-CM

## 2023-11-09 PROCEDURE — 99214 OFFICE O/P EST MOD 30 MIN: CPT | Performed by: NURSE PRACTITIONER

## 2023-11-09 RX ORDER — SEMAGLUTIDE 0.68 MG/ML
0.5 INJECTION, SOLUTION SUBCUTANEOUS WEEKLY
Qty: 9 ML | Refills: 0 | Status: SHIPPED | OUTPATIENT
Start: 2023-11-09

## 2023-11-09 RX ORDER — LOSARTAN POTASSIUM 25 MG/1
25 TABLET ORAL DAILY
Qty: 90 TABLET | Refills: 1 | Status: SHIPPED | OUTPATIENT
Start: 2023-11-09

## 2023-11-09 RX ORDER — LISINOPRIL 2.5 MG/1
2.5 TABLET ORAL DAILY
COMMUNITY
Start: 2023-11-09 | End: 2023-11-09 | Stop reason: SINTOL

## 2023-11-15 ENCOUNTER — OFFICE VISIT (OUTPATIENT)
Dept: INTERNAL MEDICINE CLINIC | Facility: CLINIC | Age: 53
End: 2023-11-15
Payer: COMMERCIAL

## 2023-11-15 VITALS
BODY MASS INDEX: 45.48 KG/M2 | OXYGEN SATURATION: 98 % | WEIGHT: 273 LBS | SYSTOLIC BLOOD PRESSURE: 130 MMHG | HEART RATE: 78 BPM | DIASTOLIC BLOOD PRESSURE: 80 MMHG | HEIGHT: 65 IN | TEMPERATURE: 98 F | RESPIRATION RATE: 20 BRPM

## 2023-11-15 DIAGNOSIS — E11.65 TYPE 2 DIABETES MELLITUS WITH HYPERGLYCEMIA, WITHOUT LONG-TERM CURRENT USE OF INSULIN (HCC): Primary | ICD-10-CM

## 2023-11-15 DIAGNOSIS — I48.0 PAROXYSMAL ATRIAL FIBRILLATION (HCC): ICD-10-CM

## 2023-11-15 DIAGNOSIS — B40.9 BLASTOMYCOSIS: ICD-10-CM

## 2023-11-15 DIAGNOSIS — R09.89 DIMINISHED PULSES IN LOWER EXTREMITY: ICD-10-CM

## 2023-11-15 DIAGNOSIS — Z12.11 SCREENING FOR COLON CANCER: ICD-10-CM

## 2023-11-15 DIAGNOSIS — M62.81 MUSCLE WEAKNESS (GENERALIZED): ICD-10-CM

## 2023-11-15 DIAGNOSIS — Z23 NEED FOR VACCINATION: ICD-10-CM

## 2023-11-15 DIAGNOSIS — E78.5 HYPERLIPIDEMIA, UNSPECIFIED HYPERLIPIDEMIA TYPE: ICD-10-CM

## 2023-11-15 DIAGNOSIS — I10 ESSENTIAL HYPERTENSION: ICD-10-CM

## 2023-11-15 RX ORDER — ROSUVASTATIN CALCIUM 5 MG/1
5 TABLET, COATED ORAL NIGHTLY
Qty: 30 TABLET | Refills: 0 | Status: SHIPPED | OUTPATIENT
Start: 2023-11-15

## 2023-11-15 RX ORDER — FLUTICASONE PROPIONATE 50 MCG
2 SPRAY, SUSPENSION (ML) NASAL DAILY
COMMUNITY
Start: 2023-03-21

## 2023-11-15 RX ORDER — OMEGA-3-ACID ETHYL ESTERS 1 G/1
1 CAPSULE, LIQUID FILLED ORAL DAILY
Qty: 30 CAPSULE | Refills: 2 | Status: SHIPPED | OUTPATIENT
Start: 2023-11-15

## 2023-11-15 RX ORDER — ATORVASTATIN CALCIUM 10 MG/1
10 TABLET, FILM COATED ORAL NIGHTLY
COMMUNITY
Start: 2023-03-21 | End: 2023-11-15

## 2023-11-15 NOTE — PATIENT INSTRUCTIONS
- Please follow up with Cardiology Dr. Haley Cevallos and Rosmery Perez MD - [phone]     - Please follow up with Infectious disease doctor Javier Lutz MD [address] [place] [address], [address] [place] [address],  [phone]    - Follow up with Pulmonology  Dr. David Reeves  -   [phone]      - Flonase 2 puffs in each nostril 1 time a day for 2 weeks.

## 2023-11-16 ENCOUNTER — TELEPHONE (OUTPATIENT)
Dept: INTERNAL MEDICINE CLINIC | Facility: CLINIC | Age: 53
End: 2023-11-16

## 2023-11-16 ENCOUNTER — ORDER TRANSCRIPTION (OUTPATIENT)
Dept: ADMINISTRATIVE | Facility: HOSPITAL | Age: 53
End: 2023-11-16

## 2023-11-16 DIAGNOSIS — B40.9 BLASTOMYCOSIS: Primary | ICD-10-CM

## 2023-11-16 DIAGNOSIS — Z13.9 ENCOUNTER FOR SCREENING: Primary | ICD-10-CM

## 2023-11-16 NOTE — TELEPHONE ENCOUNTER
Request from [address] [place] [address] to call ID to assist setting up apt for pt. Referral placed for . Called 's office, they will reach out to patient to schedule appointment with first available provider ASAP. They requested referral, last OV notes, labs and demographics/insurance info be faxed to office. Faxed authorized referral to office at [phone]. Fax confirmed. As soon as note is completed please route back to triage so we can fax all documentation to office.

## 2023-11-27 ENCOUNTER — DIABETIC EDUCATION (OUTPATIENT)
Dept: ENDOCRINOLOGY CLINIC | Facility: CLINIC | Age: 53
End: 2023-11-27
Payer: COMMERCIAL

## 2023-11-27 VITALS — BODY MASS INDEX: 47 KG/M2 | WEIGHT: 281.38 LBS

## 2023-11-27 DIAGNOSIS — E11.65 TYPE 2 DIABETES MELLITUS WITH HYPERGLYCEMIA, WITHOUT LONG-TERM CURRENT USE OF INSULIN (HCC): Primary | ICD-10-CM

## 2023-11-27 NOTE — PATIENT INSTRUCTIONS
You have lost about 9 pounds since your last visit 1 month ago. Good work with increasing your vegetable intake! Keep up the good work with this! Goals to work towards:  1.) Aim to follow a meal plan with a consistent amount of carbohydrates for meals and snacks:  Goal for meals is 45-60 grams of carbohydrates for each meal (3 meals per day)  Goal for snacks is 15 grams of carbohydrates for each snack (1-2 per day)    Aim to include carbohydrates plus protein with meals and snacks    2.) If it is easier, you can also use the Plate Method as a model for your meals:  1/2 of your plate is non-starchy vegetables, 1/4 of your plate is lean protein, 1/4 of your plate is starchy or carbohydrate foods      Consider adding some blood sugar checks 2 hours after a meal to help see the impact of the meal on your blood sugars. Blood Glucose Goals  Blood sugar goals: less than 130 mg/dl in the morning (fasting) and less than 180 mg/dl 2 hours after meals. Keep glucose tabs or fast acting carbohydrates with you for treatment of lows; for blood glucose levels less than 70 mg/dl, take 15 grams of fast acting carbohydrates (3-4 glucose tabs, 4 ounces of juice, or as discussed). Retest in 15 min. If not above 70 mg/dl, retreat. Follow up with the dietitian in 1-2 months.

## 2023-11-27 NOTE — PROGRESS NOTES
Danielle Davis  : 1970 was seen for Diabetic Education, follow up visit:    Date: 2023  Referring Provider: JIMENEZ Camargo  Start time: 8:30 am End time: 9:15 am    Assessment:     Reason for visit:   Here for follow up visit. Last office visit with Fiordaliza Rodarte RD, [address] on 10/4/23. He has been working towards making improvements - has been including more non-starchy vegetables, has been watching his portions with meals, and has tried to include different types of protein drinks. His A1c has improved - reduced from 11.7% (23) to 7.6% (23). He has also lost 9.4 pounds since his last visit about 1 month ago. Wt Readings from Last 6 Encounters:   23 281 lb 6.4 oz   11/15/23 273 lb   10/04/23 290 lb 12.8 oz   23 293 lb 12.8 oz   23 280 lb   23 273 lb 9.5 oz         HgbA1C (%)   Date Value   2023 7.6 (H)          Current DM management:   Oral:  Metformin 500 mg twice daily  Farxiga 10 mg daily   *Notes he just ran out of Farxiga-needs refill    Injectable:  Ozempic 0.5 mg once weekly      Glucose testing at home:     Currently checking BG: Yes,  1 times/day, fasting    BG results: per patient report   FBG: 160 (this morning), usually about 145 mg/dl        Diet History:  Usually eats 3 meals and a snack a day.  Notes he feels comfortable with carb counting, has been reading labels (checks for sodium, sugars, carbs-checks daily % and multiplies by 3) and watching his portions.  (a.m.) Today ate breakfast burrito, coffee w/stevia and 1 tsp plain powdered creamer, small orange juice  (mid-day) Yesterday ate late breakfast, did not eat lunch-had a yogurt  (p.m.) Yesterday ate tilapia, small amount of sweet potato, salad  (snacks) Apple or energy bar or cheese stick  (Beverages) Coffee, mainly water, green tea and tata w/turmeric, has slowed down on amount of diet soda     He has been including Benefiber about every two weeks and has also added Mary for probiotics. He has been incorporating more liquids and water. Notes he has been having less constipation now. States he has been including more vegetables (cauliflower, broccoli, salads). Currently exercising: No  Some limitations with exercise, okay to include upper body activity. He has been trying to include some upper body movement as able. Education:     Diabetes Overview  Discussed current HgbA1C, Discussed benefits of blood glucose control and blood glucose targets    Healthy Eating  Reviewed basic nutrition concepts for diabetes management, MyPlate, carbohydrate counting and label reading   Reviewed heart healthy diet (low fat, low saturated fat, high fiber, reduced sodium)  Reviewed label reading to help determine grams of carbs in foods    Being Active  Benefits and effect of activity on BG discussed. Reviewed when to call MD and benefits of goal setting. Monitoring  Reinforced glucose monitoring schedules:   Discussed trying to add some blood sugar checks 2 hours after a meal to help see impact on blood sugar levels. Also discussed okay to switch off when testing between fasting and 2 hours after a meal if only testing once/day. Taking Medication  Reviewed medication use, action, timing, and side effects. Injectables: Site selection, rotation, action, timing reviewed. Recommendations:     Practice healthful eating patterns, emphasizing a variety of nutrient dense foods in appropriate portion sizes. Monitor carbohydrate intake with the MyPlate method   Practice carbohydrate counting and label reading  Aim to keep carbs to 45-60 grams/meal, 15 grams/snack  Consider adding some blood sugar checks 2 hours after a meal to help see impact on blood sugars    Blood Glucose Goals  Blood sugar goals: less than 130 mg/dl in the morning (fasting) and less than 180 mg/dl 2 hours after meals.   Keep glucose tabs or fast acting carbohydrates with you for treatment of lows; for blood glucose levels less than 70 mg/dl, take 15 grams of fast acting carbohydrates (3-4 glucose tabs, 4 ounces of juice, or as discussed). Retest in 15 min. If not above 70 mg/dl, retreat. Patients personal goals:     1.) Aim to follow a meal plan with a consistent amount of carbohydrates for meals and snacks:  Goal for meals is 45-60 grams of carbohydrates for each meal (3 meals per day)  Goal for snacks is 15 grams of carbohydrates for each snack (1-2 per day)    Aim to include carbohydrates plus protein with meals and snacks    2.) If it is easier, you can also use the Plate Method as a model for your meals:  1/2 of your plate is non-starchy vegetables, 1/4 of your plate is lean protein, 1/4 of your plate is starchy or carbohydrate foods         Follow up:   1/10/2024 Ted Bergeron RD      Patient verbalized understanding and has no further questions at this time.     Isaiah Ramirez RDN, LDN, [address]

## 2023-11-29 NOTE — TELEPHONE ENCOUNTER
Please call the patient to see if he was able to make it to the appointment with Infectious Diseases doctor? If not, please remind him that it is very important for him to follow up with them, because the infection he has is life threatening. Please help him schedule an appointment again if needed.

## 2023-11-29 NOTE — TELEPHONE ENCOUNTER
All numbers have been called, called patient again on work phone but no answer. LM to call back. Will flag to call again tomorrow. WDL

## 2023-11-30 ENCOUNTER — TELEPHONE (OUTPATIENT)
Dept: INTERNAL MEDICINE CLINIC | Facility: CLINIC | Age: 53
End: 2023-11-30

## 2023-11-30 ENCOUNTER — HOSPITAL ENCOUNTER (OUTPATIENT)
Dept: CT IMAGING | Age: 53
Discharge: HOME | End: 2023-11-30
Attending: STUDENT IN AN ORGANIZED HEALTH CARE EDUCATION/TRAINING PROGRAM

## 2023-11-30 DIAGNOSIS — Z13.9 ENCOUNTER FOR SCREENING: ICD-10-CM

## 2023-11-30 NOTE — TELEPHONE ENCOUNTER
Please call the Infectious Disease office and ask if they could fax us the records form the visit:    Sanchez Gallegos MD [address] [place] [address] 3, [address] [address],  [phone]

## 2023-11-30 NOTE — PROGRESS NOTES
Date of Service 11/30/2023    GABBY RIDER  Date of Birth 7/4/1970    Patient Age: 52year old    PCP: Sridhar Yang MD  [address] [id number]    Heart Scan Consult  Preliminary Heart Scan Score: 12.5    Previous Screening  Heart Scan Completed Previously: No        Peripheral Vascular Scan Completed Previously: No          Risk Factors  Personal Risk Factors  Non-alterable Risk Factors: Family History;Personal History (Diabetes in family history)  Alterable Risk Factors: High Blood Pressure; Abnormal Cholesterol;Obesity; Lack of exercise;Unhealthy eating;Diabetes      Body Mass Index  There is no height or weight on file to calculate BMI. Blood Pressure  /80 on med. (Normal =< 120/80,  Elevated = 120-129/ >80,  High Stage1 130-139/80-89 , Stage2 >140/>90)    Lipid Profile  Cholesterol: 193, done on 11/8/2023. HDL Cholesterol: 36, done on 11/8/2023. LDL Cholesterol: 135, done on 11/8/2023. TriGlycerides 121, done on 11/8/2023. Just recently started statin after these labs were done. Cholesterol Goals  Value   Total  =< 200   HDL  = > 45 Men = > 55 Women   LDL   =< 100   Triglycerides  =< 150       Glucose and Hemoglobin A1C  Type II DM on meds. Lab Results   Component Value Date     (H) 11/08/2023    A1C 7.6 (H) 11/08/2023     (Normal Fasting Glucose < 100mg/dl )    Nurse Review  Risk factor information and results reviewed with Nurse: Yes    Recommended Follow Up:  Consult your physician regarding[de-identified] Final Heart Scan Report; Discuss potential for Incidental Finding      Recommendations for Change:  Nutrition Changes: Low Saturated Fat;Low Fat Dairy; Increase Fiber    Cholesterol Modification (goal of therapy depends upon your risk):  Increase HDL (Healthy/Good) Normal >45 Men >55 Women;Decrease Triglycerides (Ugly) Normal <150;Decrease LDL (Lousy/Bad) Ideal <100    Exercise: Initiate Program    Smoking Cessation: No Change Needed    Weight Management: Decrease Current Weight         Repeat Heart Scan: Discuss with your Physician;3 Years if Calcium Score is > 0.0              [place] Recommended Resources:  Recommended Resources: Upcoming Classes, Medical Services and [facility]. [email or web address] [phone] [facility]            Teri Martinez RN        Please Contact the Nurse Heart Line with any Questions or Concerns [phone].

## 2023-12-13 ENCOUNTER — HOSPITAL ENCOUNTER (OUTPATIENT)
Dept: ULTRASOUND IMAGING | Age: 53
Discharge: HOME | End: 2023-12-13
Attending: STUDENT IN AN ORGANIZED HEALTH CARE EDUCATION/TRAINING PROGRAM

## 2023-12-13 ENCOUNTER — OFFICE VISIT (OUTPATIENT)
Dept: INTERNAL MEDICINE CLINIC | Facility: CLINIC | Age: 53
End: 2023-12-13
Payer: COMMERCIAL

## 2023-12-13 VITALS
OXYGEN SATURATION: 96 % | HEART RATE: 76 BPM | RESPIRATION RATE: 16 BRPM | SYSTOLIC BLOOD PRESSURE: 132 MMHG | TEMPERATURE: 98 F | BODY MASS INDEX: 45.59 KG/M2 | WEIGHT: 277 LBS | HEIGHT: 65.5 IN | DIASTOLIC BLOOD PRESSURE: 80 MMHG

## 2023-12-13 DIAGNOSIS — Z13.9 ENCOUNTER FOR SCREENING: ICD-10-CM

## 2023-12-13 DIAGNOSIS — I10 ESSENTIAL HYPERTENSION: ICD-10-CM

## 2023-12-13 DIAGNOSIS — I48.0 PAROXYSMAL ATRIAL FIBRILLATION (HCC): ICD-10-CM

## 2023-12-13 DIAGNOSIS — B40.2 PULMONARY BLASTOMYCOSIS (HCC): ICD-10-CM

## 2023-12-13 DIAGNOSIS — E11.65 TYPE 2 DIABETES MELLITUS WITH HYPERGLYCEMIA, WITHOUT LONG-TERM CURRENT USE OF INSULIN (HCC): ICD-10-CM

## 2023-12-13 DIAGNOSIS — Z00.00 PHYSICAL EXAM: Primary | ICD-10-CM

## 2023-12-13 DIAGNOSIS — R93.1 AGATSTON CORONARY ARTERY CALCIUM SCORE LESS THAN 100: ICD-10-CM

## 2023-12-13 DIAGNOSIS — E78.5 HYPERLIPIDEMIA, UNSPECIFIED HYPERLIPIDEMIA TYPE: ICD-10-CM

## 2023-12-13 DIAGNOSIS — Z12.5 SCREENING FOR PROSTATE CANCER: ICD-10-CM

## 2023-12-13 PROBLEM — U07.1 COVID-19 VIRUS INFECTION: Status: RESOLVED | Noted: 2020-06-07 | Resolved: 2023-12-13

## 2023-12-13 PROBLEM — R06.09 CHRONIC DYSPNEA: Status: ACTIVE | Noted: 2023-10-18

## 2023-12-13 PROBLEM — M19.90 OSTEOARTHRITIS: Status: ACTIVE | Noted: 2023-12-13

## 2023-12-13 PROBLEM — E78.2 MIXED HYPERLIPIDEMIA: Status: ACTIVE | Noted: 2023-12-13

## 2023-12-13 PROBLEM — R35.0 FREQUENCY OF MICTURITION: Status: ACTIVE | Noted: 2023-12-13

## 2023-12-13 PROBLEM — B39.2: Status: ACTIVE | Noted: 2023-08-30

## 2023-12-13 PROBLEM — M79.671 PAIN IN RIGHT FOOT: Status: ACTIVE | Noted: 2023-12-13

## 2023-12-13 PROBLEM — J06.9 ACUTE UPPER RESPIRATORY INFECTION, UNSPECIFIED: Status: RESOLVED | Noted: 2023-12-13 | Resolved: 2023-12-13

## 2023-12-13 PROBLEM — B40.9 BLASTOMYCOSIS: Status: RESOLVED | Noted: 2023-08-30 | Resolved: 2023-12-13

## 2023-12-13 PROBLEM — E66.01 MORBID OBESITY (HCC): Status: ACTIVE | Noted: 2023-12-13

## 2023-12-13 PROBLEM — E11.9 DIABETES MELLITUS (HCC): Status: ACTIVE | Noted: 2023-12-13

## 2023-12-13 PROBLEM — E11.9 TYPE 2 DIABETES MELLITUS WITHOUT COMPLICATION (HCC): Status: ACTIVE | Noted: 2023-08-30

## 2023-12-13 PROBLEM — M25.552 PAIN IN LEFT HIP: Status: ACTIVE | Noted: 2023-12-13

## 2023-12-13 PROBLEM — J85.0 NECROTIZING PNEUMONIA (HCC): Status: RESOLVED | Noted: 2023-08-14 | Resolved: 2023-12-13

## 2023-12-13 PROBLEM — J06.9 ACUTE UPPER RESPIRATORY INFECTION, UNSPECIFIED: Status: ACTIVE | Noted: 2023-12-13

## 2023-12-13 PROBLEM — G47.33 OBSTRUCTIVE SLEEP APNEA SYNDROME: Status: ACTIVE | Noted: 2023-11-30

## 2023-12-13 PROBLEM — E11.9 DIABETES MELLITUS (HCC): Status: RESOLVED | Noted: 2023-12-13 | Resolved: 2023-12-13

## 2023-12-13 PROBLEM — Z91.148 NONCOMPLIANCE WITH MEDICATION REGIMEN: Status: ACTIVE | Noted: 2023-12-13

## 2023-12-13 PROBLEM — R07.89 CHEST WALL PAIN: Status: ACTIVE | Noted: 2023-09-20

## 2023-12-13 PROBLEM — M54.50 LOW BACK PAIN, UNSPECIFIED: Status: ACTIVE | Noted: 2023-12-13

## 2023-12-13 PROCEDURE — 3075F SYST BP GE 130 - 139MM HG: CPT | Performed by: STUDENT IN AN ORGANIZED HEALTH CARE EDUCATION/TRAINING PROGRAM

## 2023-12-13 PROCEDURE — 3008F BODY MASS INDEX DOCD: CPT | Performed by: STUDENT IN AN ORGANIZED HEALTH CARE EDUCATION/TRAINING PROGRAM

## 2023-12-13 PROCEDURE — 99396 PREV VISIT EST AGE 40-64: CPT | Performed by: STUDENT IN AN ORGANIZED HEALTH CARE EDUCATION/TRAINING PROGRAM

## 2023-12-13 PROCEDURE — 3079F DIAST BP 80-89 MM HG: CPT | Performed by: STUDENT IN AN ORGANIZED HEALTH CARE EDUCATION/TRAINING PROGRAM

## 2023-12-13 RX ORDER — ITRACONAZOLE 100 MG/1
CAPSULE ORAL
COMMUNITY
Start: 2023-11-30 | End: 2023-12-13 | Stop reason: ALTCHOICE

## 2023-12-13 RX ORDER — ROSUVASTATIN CALCIUM 5 MG/1
5 TABLET, COATED ORAL NIGHTLY
Qty: 60 TABLET | Refills: 2 | Status: SHIPPED | OUTPATIENT
Start: 2023-12-13

## 2023-12-13 RX ORDER — VORICONAZOLE 200 MG/1
200 TABLET, FILM COATED ORAL EVERY 12 HOURS
COMMUNITY
Start: 2023-12-08

## 2023-12-13 RX ORDER — METOPROLOL SUCCINATE 25 MG/1
25 TABLET, EXTENDED RELEASE ORAL DAILY
COMMUNITY
Start: 2023-09-23 | End: 2023-12-13 | Stop reason: ALTCHOICE

## 2023-12-13 NOTE — PATIENT INSTRUCTIONS
- Think about Shingles vaccine    - Please schedule appointment for colonoscopy  Provider Address Phone   Ad Hernandez MD [address] [place] [address] Dr Maykel Golden [phone]       - Follow up with Pulmonology  Dr. David Reeves  -   [phone]

## 2023-12-18 RX ORDER — OMEGA-3-ACID ETHYL ESTERS 1 G/1
1 CAPSULE, LIQUID FILLED ORAL DAILY
Qty: 90 CAPSULE | Refills: 1 | Status: SHIPPED | OUTPATIENT
Start: 2023-12-18

## 2023-12-26 NOTE — TELEPHONE ENCOUNTER
Requested Prescriptions     Pending Prescriptions Disp Refills    metFORMIN 500 MG Oral Tab 180 tablet 1     Sig: Take 1 tablet (500 mg total) by mouth 2 (two) times daily with meals. Future Appointments   Date Time Provider Brandyn Mcpherson   1/10/2024  9:00 AM Denzel Horne RD [facility]   3/18/2024  7:30 AM Darrick Kayser, APRN [facility]     Last A1c value was 7.6% done 11/8/2023.   Refill 11/09/23  LOV11/09/23

## 2023-12-27 RX ORDER — LOSARTAN POTASSIUM 25 MG/1
25 TABLET ORAL DAILY
Qty: 90 TABLET | Refills: 1 | Status: SHIPPED | OUTPATIENT
Start: 2023-12-27

## 2023-12-27 NOTE — TELEPHONE ENCOUNTER
Requested Prescriptions     Pending Prescriptions Disp Refills    losartan 25 MG Oral Tab 90 tablet 1     Sig: Take 1 tablet (25 mg total) by mouth daily. Future Appointments   Date Time Provider Brandyn Mcpherson   1/10/2024  9:00 AM Nanci Arceo RD [facility]   3/18/2024  7:30 AM JIMENEZ Dodd [facility]     Last A1c value was 7.6% done 11/8/2023.   Refill 11/09/23  LOV 11/09/23

## 2024-01-12 RX ORDER — SEMAGLUTIDE 0.68 MG/ML
0.5 INJECTION, SOLUTION SUBCUTANEOUS WEEKLY
Qty: 9 ML | Refills: 0 | Status: SHIPPED | OUTPATIENT
Start: 2024-01-12

## 2024-01-12 NOTE — TELEPHONE ENCOUNTER
Requested Prescriptions     Pending Prescriptions Disp Refills    semaglutide (OZEMPIC, 0.25 OR 0.5 MG/DOSE,) 2 MG/3ML Subcutaneous Solution Pen-injector 9 mL 0     Sig: Inject 0.5 mg into the skin once a week.     Your appointments       Date & Time Appointment Department (Center)    Feb 28, 2024  8:30 AM CST DIAB Follow Up with Solange Kirkpatrick RD [facility], [address] [place] [address] ([address])    Classes and/or appointments are held at the [facility] located at [address] [place] [address], [place] [address]    WHO TO CONTACT TO CHANGE APPOINTMENT   If you need to make changes to your appointment or have questions,  please call the Diabetes Center at [phone].    TOPICS DISCUSSED IN CLASS/APPOINTMENT  INSULIN PUMP INDVIDUALIZED ASSESSMENT  This is a one-on-one appointment with a certified diabetes educator.  An assessment will be performed and will identify the pump candidate's ability to:  Perform label reading Identify portion sizes  Use insulin to carbohydrate ratio/correction factor accurately  Accurately treat hypoglycemia using the Rule of 15    Following the assessment, education will be customized to assessed  educational needs.  Basal rates, insulin to carbohydrate and  correction factor ratios will be calculated and, if already in use,  will  be reviewed for accuracy and recalculated if necessary.    INDIVIDUAL FOLLOW-UP APPOINTMENTS  To be determined at appointment    INITIAL INDIVIDUAL ASSESSMENTS  An assessment and meter training will begin this appointment.  Educational needs will be discussed at that time.    STEP ONE SATISFIER  This is a 1-hour appointment taught by a registered dietitian and  diabetes educator. Half of this appointment time is dedicated to the  development of a personalized education plan.  This plan is developed  privately in a 1:1 session with the educator. The other half hour is  devoted  to learning to use a blood  glucose meter.    FAMILY INVOLVEMENT  Insurance expects the patient (child or adult) to be present for the appointment.  This class is intended for adult patients, teen patients with adult  family member and parents of young children  with diabetes.  Please make arrangements for someone to care for your children under  the age of 8.    ARRIVAL TIME  Patients should arrive 15 minutes prior to their appointment time.    INSURANCE QUESTIONS  Please bring your insurance card, 's license/ID, the order for  education from your doctor, any pertinent lab results and a list of  medications patient is taking. Should you have questions about  insurance coverage, call the [phone] number on the back of your  insurance card.        Mar 18, 2024  7:30 AM CDT Diabetes Pump follow up with Judy Rao APRN [address] ([facility] [address] [place] [address])              [address]  [facility] [address] [place] [id number]  [phone]          HGBA1C:    Lab Results   Component Value Date    A1C 7.6 (H) 11/08/2023    A1C 11.7 (H) 08/14/2023    A1C 10.1 (H) 06/07/2020     (H) 11/08/2023     LOV: 11-9-23    REFILL: 11-9-23

## 2024-02-03 DIAGNOSIS — E78.5 HYPERLIPIDEMIA, UNSPECIFIED HYPERLIPIDEMIA TYPE: ICD-10-CM

## 2024-02-03 DIAGNOSIS — E11.65 TYPE 2 DIABETES MELLITUS WITH HYPERGLYCEMIA, WITHOUT LONG-TERM CURRENT USE OF INSULIN (HCC): ICD-10-CM

## 2024-02-05 RX ORDER — OMEGA-3-ACID ETHYL ESTERS 1 G/1
1 CAPSULE, LIQUID FILLED ORAL DAILY
Qty: 90 CAPSULE | Refills: 1 | OUTPATIENT
Start: 2024-02-05

## 2024-02-05 NOTE — TELEPHONE ENCOUNTER
Requested Prescriptions     Pending Prescriptions Disp Refills    metFORMIN 500 MG Oral Tab 180 tablet 1     Sig: Take 1 tablet (500 mg total) by mouth 2 (two) times daily with meals.     Future Appointments   Date Time Provider Department Center   2/28/2024  8:30 AM Solange Kirkpatrick RD [facility]   3/18/2024  7:30 AM Judy Rao APRN [facility]     Last A1c value was 7.6% done 11/8/2023.  Refill 12/26/2023  LOV 11/9/23

## 2024-02-19 NOTE — TELEPHONE ENCOUNTER
Requested Prescriptions     Pending Prescriptions Disp Refills    dapagliflozin (FARXIGA) 10 MG Oral Tab 90 tablet 1     Sig: Take 1 tablet (10 mg total) by mouth daily.     Future Appointments   Date Time Provider Department Center   2/28/2024  8:30 AM Solange Kirkpatrick RD [facility]   3/18/2024  7:30 AM Judy Rao APRN [facility]     Last A1c value was 7.6% done 11/8/2023.  Refill 12/1/23  LOV 11/9/23

## 2024-02-28 ENCOUNTER — DIABETIC EDUCATION (OUTPATIENT)
Dept: ENDOCRINOLOGY CLINIC | Facility: CLINIC | Age: 54
End: 2024-02-28
Payer: COMMERCIAL

## 2024-02-28 VITALS — BODY MASS INDEX: 47 KG/M2 | WEIGHT: 287 LBS

## 2024-02-28 DIAGNOSIS — E11.65 TYPE 2 DIABETES MELLITUS WITH HYPERGLYCEMIA, WITHOUT LONG-TERM CURRENT USE OF INSULIN (HCC): Primary | ICD-10-CM

## 2024-02-28 PROCEDURE — G0108 DIAB MANAGE TRN  PER INDIV: HCPCS | Performed by: DIETITIAN, REGISTERED

## 2024-02-28 NOTE — PATIENT INSTRUCTIONS
Goals to work towards:    Use the Plate Method as a model for your meals:  1/2 of your plate is non-starchy vegetables, 1/4 of your plate is lean protein, 1/4 of your plate is starchy or carbohydrate foods    Use the Carb Controlled Snack Ideas list for some examples of snacks that have carbs plus a source of protein and/or healthy fats.      Get back to checking your sugars once a day. Switch off when you check your blood sugars between these two times:  1.) Fasting or before a meal  2.) 2 hours after a meal (timing is 2 hours from when you started to eat a meal)    Consider adding some blood sugar checks 2 hours after a meal to help see the impact of the meal on your blood sugars.     Blood Glucose Goals  Blood sugar goals: Between  mg/dl in the morning (fasting) and less than 180 mg/dl 2 hours after meals.    Keep glucose tabs or fast acting carbohydrates with you for treatment of lows; for blood glucose levels less than 70 mg/dl, take 15 grams of fast acting carbohydrates (3-4 glucose tabs, 4 ounces of juice, or as discussed). Retest in 15 min. If not above 70 mg/dl, retreat.    Bring your log book with you to your appointments at the [facility].

## 2024-02-28 NOTE — PROGRESS NOTES
Josh Werner  : 1970 was seen for Diabetic Education, follow up visit:    Date: 2024  Referring Provider: JIMENEZ Woodard  Start time: 8:40 am End time: 9:20 am    Assessment:     Reason for visit: Diabetes education follow up. Notes he has gained some weight, had been eating differently over the holidays.    Wt Readings from Last 6 Encounters:   24 287 lb   23 277 lb   23 281 lb 6.4 oz   11/15/23 273 lb   10/04/23 290 lb 12.8 oz   23 293 lb 12.8 oz         HgbA1C (%)   Date Value   2023 7.6 (H)        Current DM management:   Oral:  Metformin 500 mg twice daily  Farxiga 10 mg daily     Injectable:  Ozempic 0.5 mg once weekly      Glucose testing at home:     Currently checking BG: Yes,  2 times/week before meals  Is aware should be checking once/day    BG results: per patient report   Pre Meal: around 143 mg/dl            Diet History:  Usually eats 3 meals and some snacks a day. States he has been reading more food labels now (checks for sugar, sodium, fat). Notes he has been having increased hunger. He has been staying away from pork and beef, plans to start grilling more as weather gets warmer.  (a.m.) Usually eggs with 1 piece toast  (mid-day) Brings his lunch with him to work. Usually packs a turkey sandwich w/slice of cheese, lettuce, tomato, small cup chicken noodle soup. Or crackers w/tuna.  (p.m.) Can include chicken, rice, vegetables (green beans, asparagus)  (snacks) Sugar free jello, apple, orange, sugar free chocolate bar  (Beverages) Cup of coffee with stevia, sometimes has unsweetened almond milk w/sugar free gregg's syrup    Currently exercising: No  Notes he has not added upper body activity. Notes he has some activity with work, going up and down stairs.      Education:     Diabetes Overview  Reviewed diabetes pathophysiology, Discussed current HgbA1C, Discussed benefits of blood glucose control and blood glucose targets    Healthy Eating  Reviewed  basic nutrition concepts for diabetes management, MyPlate. , Reviewed heart healthy diet (low fat, low saturated fat, high fiber, reduced sodium)    Being Active  Benefits and effect of activity on BG discussed.  Reviewed when to call MD and benefits of goal setting.    Monitoring  Reinforced glucose monitoring schedules:   Start checking blood sugars once per day. Alternate when testing between fasting/before meals and 2 hours after a meal to help see patterns.        Recommendations:     Practice healthful eating patterns, emphasizing a variety of nutrient dense foods in appropriate portion sizes.    Monitor carbohydrate intake with the MyPlate method   Start checking blood sugars once/day. Alternate when testing between fasting/before meals and 2 hours after a meal to help see patterns.    Blood Glucose Goals  Blood sugar goals: less than 130 mg/dl in the morning (fasting) and less than 180 mg/dl 2 hours after meals.  Keep glucose tabs or fast acting carbohydrates with you for treatment of lows; for blood glucose levels less than 70 mg/dl, take 15 grams of fast acting carbohydrates (3-4 glucose tabs, 4 ounces of juice, or as discussed). Retest in 15 min. If not above 70 mg/dl, retreat.    Patients personal goals:   Use the Plate Method as a model for your meals:  1/2 of your plate is non-starchy vegetables, 1/4 of your plate is lean protein, 1/4 of your plate is starchy or carbohydrate foods     Use the Carb Controlled Snack Ideas list for some examples of snacks that have carbs plus a source of protein and/or healthy fats.        Get back to checking your sugars once a day. Switch off when you check your blood sugars between these two times:  1.) Fasting or before a meal  2.) 2 hours after a meal (timing is 2 hours from when you started to eat a meal)     Consider adding some blood sugar checks 2 hours after a meal to help see the impact of the meal on your blood sugars.       Follow up:   6/5/2024 Casimiro  MONICO Narvaez      Patient verbalized understanding and has no further questions at this time.    Solange Kirkpatrick, MONICON, LDN, [facility]

## 2024-03-12 DIAGNOSIS — B40.9 INFECTION DUE TO BLASTOMYCES: Primary | ICD-10-CM

## 2024-04-29 ENCOUNTER — TELEPHONE (OUTPATIENT)
Dept: INTERNAL MEDICINE CLINIC | Facility: CLINIC | Age: 54
End: 2024-04-29

## 2024-04-29 NOTE — TELEPHONE ENCOUNTER
Tried calling patient to discuss his medications as there is a question regarding Apixaban dose.  Patient did not . Asked to call back to the office during the office hours.

## 2024-05-01 ENCOUNTER — HOSPITAL ENCOUNTER (OUTPATIENT)
Dept: CT IMAGING | Facility: HOSPITAL | Age: 54
Discharge: HOME | End: 2024-05-01
Attending: INTERNAL MEDICINE
Payer: COMMERCIAL

## 2024-05-01 ENCOUNTER — TELEPHONE (OUTPATIENT)
Dept: INTERNAL MEDICINE CLINIC | Facility: CLINIC | Age: 54
End: 2024-05-01

## 2024-05-01 DIAGNOSIS — B40.9 INFECTION DUE TO BLASTOMYCES: ICD-10-CM

## 2024-05-01 PROCEDURE — 71250 CT THORAX DX C-: CPT | Performed by: INTERNAL MEDICINE

## 2024-05-10 ENCOUNTER — HOSPITAL ENCOUNTER (OUTPATIENT)
Age: 54
Discharge: HOME | End: 2024-05-10
Payer: COMMERCIAL

## 2024-05-10 VITALS
SYSTOLIC BLOOD PRESSURE: 150 MMHG | BODY MASS INDEX: 47.4 KG/M2 | RESPIRATION RATE: 22 BRPM | DIASTOLIC BLOOD PRESSURE: 83 MMHG | OXYGEN SATURATION: 96 % | WEIGHT: 302 LBS | TEMPERATURE: 98 F | HEART RATE: 69 BPM | HEIGHT: 67 IN

## 2024-05-10 DIAGNOSIS — L03.213 PERIORBITAL CELLULITIS OF LEFT EYE: Primary | ICD-10-CM

## 2024-05-10 DIAGNOSIS — E11.65 TYPE 2 DIABETES MELLITUS WITH HYPERGLYCEMIA, WITHOUT LONG-TERM CURRENT USE OF INSULIN (HCC): ICD-10-CM

## 2024-05-10 DIAGNOSIS — E78.5 HYPERLIPIDEMIA, UNSPECIFIED HYPERLIPIDEMIA TYPE: ICD-10-CM

## 2024-05-10 PROCEDURE — 99203 OFFICE O/P NEW LOW 30 MIN: CPT | Performed by: PHYSICIAN ASSISTANT

## 2024-05-10 RX ORDER — AMOXICILLIN AND CLAVULANATE POTASSIUM 875; 125 MG/1; MG/1
1 TABLET, FILM COATED ORAL 2 TIMES DAILY
Qty: 20 TABLET | Refills: 0 | Status: SHIPPED | OUTPATIENT
Start: 2024-05-10 | End: 2024-05-20

## 2024-05-10 RX ORDER — SULFAMETHOXAZOLE AND TRIMETHOPRIM 800; 160 MG/1; MG/1
1 TABLET ORAL 2 TIMES DAILY
Qty: 20 TABLET | Refills: 0 | Status: SHIPPED | OUTPATIENT
Start: 2024-05-10 | End: 2024-05-20

## 2024-05-10 RX ORDER — LOSARTAN POTASSIUM 25 MG/1
25 TABLET ORAL DAILY
Qty: 90 TABLET | Refills: 1 | Status: SHIPPED | OUTPATIENT
Start: 2024-05-10

## 2024-05-10 RX ORDER — ROSUVASTATIN CALCIUM 5 MG/1
5 TABLET, COATED ORAL NIGHTLY
Qty: 60 TABLET | Refills: 2 | OUTPATIENT
Start: 2024-05-10

## 2024-05-10 NOTE — TELEPHONE ENCOUNTER
Rx denied. 60/2 sent on 12/13.    Return in about 6 months (around 6/13/2024) for med check.     Mcm sent.    Future Appointments   Date Time Provider Department Center   6/5/2024  8:30 AM Solange Kirkpatrick RD [facility]   7/30/2024  8:15 AM Judy Rao APRN [facility]      - please call and schedule patient for med check due around 6/13/24. Thanks!

## 2024-05-10 NOTE — ED PROVIDER NOTES
Patient Seen in: [facility]      History     Chief Complaint   Patient presents with    Eye Visual Problem     Stated Complaint: left eye pain, swelling    Subjective:   HPI  Josh Werner is 53 year old male presents with left sided periorbital and eye redness x 2 days.  Patient denies  pain, visual loss, blurred vision, diplopia, injury, foreign body or foreign body sensation, eyelid swelling, ocular entrapment, fevers, chills, headache, discharge, trauma. No medications taken prior to arrival.        Objective:   Past Medical History:    Atherosclerosis of coronary artery    hx A fib    Diabetes (HCC)    Obesity              History reviewed. No pertinent surgical history.             Social History     Socioeconomic History    Marital status:    Tobacco Use    Smoking status: Never    Smokeless tobacco: Never   Vaping Use    Vaping status: Never Used   Substance and Sexual Activity    Alcohol use: Not Currently    Drug use: Never              Review of Systems   All other systems reviewed and are negative.      Positive for stated complaint: left eye pain, swelling  Other systems are as noted in HPI.  Constitutional and vital signs reviewed.      All other systems reviewed and negative except as noted above.    Physical Exam     ED Triage Vitals [05/10/24 0843]   /83   Pulse 69   Resp 22   Temp 98.1 °F (36.7 °C)   Temp src Temporal   SpO2 96 %   O2 Device None (Room air)       Current Vitals:   Vital Signs  BP: 150/83  Pulse: 69  Resp: 22  Temp: 98.1 °F (36.7 °C)  Temp src: Temporal    Oxygen Therapy  SpO2: 96 %  O2 Device: None (Room air)            Physical Exam  Vitals and nursing note reviewed.   Constitutional:       General: He is not in acute distress.     Appearance: Normal appearance. He is normal weight. He is not ill-appearing, toxic-appearing or diaphoretic.   HENT:      Head: Normocephalic and atraumatic.      Nose: Nose normal.   Eyes:      General: No  scleral icterus.        Right eye: No discharge.         Left eye: No discharge.      Extraocular Movements: Extraocular movements intact.      Pupils: Pupils are equal, round, and reactive to light.      Comments: Left sided conjunctival injection with surrounding    Cardiovascular:      Rate and Rhythm: Normal rate.      Pulses: Normal pulses.   Pulmonary:      Effort: Pulmonary effort is normal.      Breath sounds: Normal breath sounds.   Musculoskeletal:         General: No swelling, tenderness, deformity or signs of injury. Normal range of motion.      Cervical back: Normal range of motion and neck supple.   Skin:     General: Skin is warm and dry.      Capillary Refill: Capillary refill takes less than 2 seconds.      Coloration: Skin is not jaundiced or pale.      Findings: Erythema present. No bruising, lesion or rash.   Neurological:      General: No focal deficit present.      Mental Status: He is alert and oriented to person, place, and time. Mental status is at baseline.   Psychiatric:         Mood and Affect: Mood normal.         Behavior: Behavior normal.         Thought Content: Thought content normal.         Judgment: Judgment normal.               ED Course   Labs Reviewed - No data to display                   MDM                                        Medical Decision Making  53 year old male presents with left eye redness with surrounding soft tissue swelling and erythema without ocular entrapment.  Considerations include but not limited to periorbital celllulitis vs orbital cellulitis vs bacterial/ viral/ allergic conjunctivitis vs corneal abrasion  Plan   - DC to home   - refer to ophthalmology   - rx: bactrim DS po/ Augmentin 875mg po BID x 10 days  - return to IC/ ED if symptoms worsens         Disposition and Plan     Clinical Impression:  1. Periorbital cellulitis of left eye         Disposition:  Discharge  5/10/2024  8:54 am    Follow-up:  Chloe Grayson MD  [address] [facility]  [address]  [facility] [address]  [phone]          [facility]  [address]  [facility] [address]              Medications Prescribed:  Current Discharge Medication List        START taking these medications    Details   sulfamethoxazole-trimethoprim -160 MG Oral Tab per tablet Take 1 tablet by mouth 2 (two) times daily for 10 days.  Qty: 20 tablet, Refills: 0      amoxicillin clavulanate 875-125 MG Oral Tab Take 1 tablet by mouth 2 (two) times daily for 10 days.  Qty: 20 tablet, Refills: 0

## 2024-05-10 NOTE — TELEPHONE ENCOUNTER
Requested Prescriptions     Pending Prescriptions Disp Refills    losartan 25 MG Oral Tab 90 tablet 1     Sig: Take 1 tablet (25 mg total) by mouth daily.     Your appointments       Date & Time Appointment Department (Center)    Jun 05, 2024 8:30 AM CDT DIAB Follow Up with Solange Kirkpatrick RD [facility], [address] [place] [address] ([address])    Classes and/or appointments are held at the [facility] located at [address] [place] [address], [address], [place] [address]    WHO TO CONTACT TO CHANGE APPOINTMENT   If you need to make changes to your appointment or have questions,  please call the Diabetes Center at [phone].    TOPICS DISCUSSED IN CLASS/APPOINTMENT  INSULIN PUMP INDVIDUALIZED ASSESSMENT  This is a one-on-one appointment with a certified diabetes educator.  An assessment will be performed and will identify the pump candidate's ability to:  Perform label reading Identify portion sizes  Use insulin to carbohydrate ratio/correction factor accurately  Accurately treat hypoglycemia using the Rule of 15    Following the assessment, education will be customized to assessed  educational needs.  Basal rates, insulin to carbohydrate and  correction factor ratios will be calculated and, if already in use,  will  be reviewed for accuracy and recalculated if necessary.    INDIVIDUAL FOLLOW-UP APPOINTMENTS  To be determined at appointment    INITIAL INDIVIDUAL ASSESSMENTS  An assessment and meter training will begin this appointment.  Educational needs will be discussed at that time.    STEP ONE SATISFIER  This is a 1-hour appointment taught by a registered dietitian and  diabetes educator. Half of this appointment time is dedicated to the  development of a personalized education plan.  This plan is developed  privately in a 1:1 session with the educator. The other half hour is  devoted  to learning to use a blood glucose meter.    FAMILY INVOLVEMENT  Insurance expects  the patient (child or adult) to be present for the appointment.  This class is intended for adult patients, teen patients with adult  family member and parents of young children  with diabetes.  Please make arrangements for someone to care for your children under  the age of 8.    ARRIVAL TIME  Patients should arrive 15 minutes prior to their appointment time.    INSURANCE QUESTIONS  Please bring your insurance card, 's license/ID, the order for  education from your doctor, any pertinent lab results and a list of  medications patient is taking. Should you have questions about  insurance coverage, call the [phone] on the back of your  insurance card.        Jul 30, 2024 8:15 AM CDT Diabetes Pump follow up with Judy Rao APRN [address] ([facility] [place])              [address]  [facility] [place]  [address] [place] [id number] [phone]          Last A1c value was 7.6% done 11/8/2023.    Refill 12/27/23  LOV 11/9/23

## 2024-05-29 ENCOUNTER — TELEPHONE (OUTPATIENT)
Dept: INTERNAL MEDICINE CLINIC | Facility: CLINIC | Age: 54
End: 2024-05-29

## 2024-05-29 ENCOUNTER — LAB ENCOUNTER (OUTPATIENT)
Dept: LAB | Age: 54
End: 2024-05-29
Attending: STUDENT IN AN ORGANIZED HEALTH CARE EDUCATION/TRAINING PROGRAM

## 2024-05-29 ENCOUNTER — OFFICE VISIT (OUTPATIENT)
Dept: INTERNAL MEDICINE CLINIC | Facility: CLINIC | Age: 54
End: 2024-05-29

## 2024-05-29 VITALS
RESPIRATION RATE: 20 BRPM | HEIGHT: 67 IN | SYSTOLIC BLOOD PRESSURE: 110 MMHG | TEMPERATURE: 98 F | BODY MASS INDEX: 45.99 KG/M2 | OXYGEN SATURATION: 95 % | HEART RATE: 85 BPM | DIASTOLIC BLOOD PRESSURE: 68 MMHG | WEIGHT: 293 LBS

## 2024-05-29 DIAGNOSIS — I48.0 PAROXYSMAL ATRIAL FIBRILLATION (HCC): ICD-10-CM

## 2024-05-29 DIAGNOSIS — Z12.5 SCREENING FOR PROSTATE CANCER: ICD-10-CM

## 2024-05-29 DIAGNOSIS — E66.01 CLASS 3 SEVERE OBESITY WITH SERIOUS COMORBIDITY AND BODY MASS INDEX (BMI) OF 45.0 TO 49.9 IN ADULT, UNSPECIFIED OBESITY TYPE (HCC): ICD-10-CM

## 2024-05-29 DIAGNOSIS — E11.65 TYPE 2 DIABETES MELLITUS WITH HYPERGLYCEMIA, WITHOUT LONG-TERM CURRENT USE OF INSULIN (HCC): Primary | ICD-10-CM

## 2024-05-29 DIAGNOSIS — E78.2 MIXED HYPERLIPIDEMIA: ICD-10-CM

## 2024-05-29 DIAGNOSIS — R93.1 AGATSTON CORONARY ARTERY CALCIUM SCORE LESS THAN 100: ICD-10-CM

## 2024-05-29 DIAGNOSIS — I10 ESSENTIAL HYPERTENSION: ICD-10-CM

## 2024-05-29 DIAGNOSIS — B40.2 PULMONARY BLASTOMYCOSIS (HCC): ICD-10-CM

## 2024-05-29 DIAGNOSIS — Z91.148 NONCOMPLIANCE WITH MEDICATION REGIMEN: ICD-10-CM

## 2024-05-29 PROBLEM — B39.2: Status: RESOLVED | Noted: 2023-08-30 | Resolved: 2024-05-29

## 2024-05-29 PROBLEM — M25.552 PAIN IN LEFT HIP: Status: RESOLVED | Noted: 2023-12-13 | Resolved: 2024-05-29

## 2024-05-29 PROBLEM — E11.9 TYPE 2 DIABETES MELLITUS WITHOUT COMPLICATION (HCC): Status: RESOLVED | Noted: 2023-08-30 | Resolved: 2024-05-29

## 2024-05-29 PROBLEM — M79.671 PAIN IN RIGHT FOOT: Status: RESOLVED | Noted: 2023-12-13 | Resolved: 2024-05-29

## 2024-05-29 PROBLEM — U07.1 PNEUMONIA DUE TO COVID-19 VIRUS: Status: RESOLVED | Noted: 2020-06-07 | Resolved: 2024-05-29

## 2024-05-29 PROBLEM — M54.50 LOW BACK PAIN, UNSPECIFIED: Status: RESOLVED | Noted: 2023-12-13 | Resolved: 2024-05-29

## 2024-05-29 PROBLEM — R07.89 CHEST WALL PAIN: Status: RESOLVED | Noted: 2023-09-20 | Resolved: 2024-05-29

## 2024-05-29 PROBLEM — J12.82 PNEUMONIA DUE TO COVID-19 VIRUS: Status: RESOLVED | Noted: 2020-06-07 | Resolved: 2024-05-29

## 2024-05-29 LAB — COMPLEXED PSA SERPL-MCNC: 0.25 NG/ML (ref ?–4)

## 2024-05-29 PROCEDURE — 3074F SYST BP LT 130 MM HG: CPT | Performed by: STUDENT IN AN ORGANIZED HEALTH CARE EDUCATION/TRAINING PROGRAM

## 2024-05-29 PROCEDURE — 3008F BODY MASS INDEX DOCD: CPT | Performed by: STUDENT IN AN ORGANIZED HEALTH CARE EDUCATION/TRAINING PROGRAM

## 2024-05-29 PROCEDURE — 84153 ASSAY OF PSA TOTAL: CPT | Performed by: STUDENT IN AN ORGANIZED HEALTH CARE EDUCATION/TRAINING PROGRAM

## 2024-05-29 PROCEDURE — 99214 OFFICE O/P EST MOD 30 MIN: CPT | Performed by: STUDENT IN AN ORGANIZED HEALTH CARE EDUCATION/TRAINING PROGRAM

## 2024-05-29 PROCEDURE — 3078F DIAST BP <80 MM HG: CPT | Performed by: STUDENT IN AN ORGANIZED HEALTH CARE EDUCATION/TRAINING PROGRAM

## 2024-05-29 RX ORDER — APIXABAN 2.5 MG/1
2.5 TABLET, FILM COATED ORAL 2 TIMES DAILY
COMMUNITY
Start: 2024-05-09

## 2024-05-29 NOTE — PROGRESS NOTES
OFFICE NOTE     Patient ID: Josh Werner is a 53 year old male.  Today's Date: 05/29/24  Chief Complaint: Medication Follow-Up (6 month follow up)    Patient was seen by cardiologist on 4/25/2024, was recommended to continue metoprolol XL 25 mg daily(however patient not taking as he had allergic reaction to it), Eliquis (there was some confusion about the Eliquis dose, it was recommended by cardiology to take full dose of 5 mg twice a day, however patient is on antifungal medication, for which he is supposed to take reduced dose - 2.5 mg twice daily of Eliquis) - is currently taking 2.5 mg BID  Patient was supposed to see Dr. Anderson for management of tricuspid regurgitation and pulmonary hypertension seen on ECHO from 08/2023  MCT 7 day was ordered for the patient, he needs to complete this prior to his 6 month follow up appt with cardiology- Patient made aware of this.    Apparently patient has not been taking his rosuvastatin 5 mg daily and losartan 25 mg daily for the past 4 months.  This was recently refilled for him, however he did still did not pick it up from the pharmacy.    In regards to diabetes: He takes Metformin 500 mg twice daily, dapagliflozin 10 mg daily, Ozempic 0.5 mg once a week.  He follows with the diabetes clinic.  His recent microalbumin to creatinine ratio done on 11/8/2023 was less than 30.  His most recent hemoglobin A1c was done on 11/8/2023 and was 7.6.  He needs to follow-up with the diabetes clinic on 6/5/2024.  He is up-to-date with his foot exam.  Needs to do eye exam.    He saw Dr. Antonio (Infectious disease) about 2 month ago, continues to take Voriconazole 200 mg BID.   Patient was started on it around November 2023.  Will need to stay on it for 6-12 month. Will need to repeat the CT scan prior to treatment discontinuation per  ID. Was supposed to do the Voriconazole levels done, but not completed yet.       ECHO done on 8/17/2023:  Conclusions:   1. Left  ventricle: The cavity size was normal. Wall thickness was mildly      increased. Systolic function was hyperdynamic. The estimated ejection      fraction was 70-75%, by the Teichholz method. No diagnostic evidence for      regional wall motion abnormalities. Unable to assess LV diastolic      function due to heart rhythm.   2. Left atrium: The atrium was mildly dilated.   3. Tricuspid valve: There was mild-moderate regurgitation.   4. Pulmonary arteries: Systolic pressure was mildly to moderately increased.      The peak systolic pressure is 41mm Hg.   5. Pericardium, extracardiac: There was a left pleural effusion.   Impressions:  No previous study was available for comparison.         Vitals:    05/29/24 0911   BP: 110/68   Pulse: 85   Resp: 20   Temp: 98.4 °F (36.9 °C)   SpO2: 95%   Weight: 293 lb (132.9 kg)   Height: 5' 7\" (1.702 m)     body mass index is 45.89 kg/m².  BP Readings from Last 3 Encounters:   05/29/24 110/68   05/10/24 150/83   12/13/23 132/80     The 10-year ASCVD risk score (Garret TINEO, et al., 2019) is: 12%    Values used to calculate the score:      Age: 53 years      Sex: Male      Is Non- : No      Diabetic: Yes      Tobacco smoker: No      Systolic Blood Pressure: 110 mmHg      Is BP treated: Yes      HDL Cholesterol: 36 mg/dL      Total Cholesterol: 193 mg/dL      Medications reviewed:  Current Outpatient Medications   Medication Sig Dispense Refill    ELIQUIS 2.5 MG Oral Tab Take 1 tablet (2.5 mg total) by mouth 2 (two) times daily.      losartan 25 MG Oral Tab Take 1 tablet (25 mg total) by mouth daily. 90 tablet 1    dapagliflozin (FARXIGA) 10 MG Oral Tab Take 1 tablet (10 mg total) by mouth daily. 90 tablet 1    metFORMIN 500 MG Oral Tab Take 1 tablet (500 mg total) by mouth 2 (two) times daily with meals. 180 tablet 1    semaglutide (OZEMPIC, 0.25 OR 0.5 MG/DOSE,) 2 MG/3ML Subcutaneous Solution Pen-injector Inject 0.5 mg into the skin once a week. 9 mL 0     omega-3-acid ethyl esters 1 g Oral Cap Take 1 capsule (1 g total) by mouth daily. 90 capsule 1    voriconazole 200 MG Oral Tab Take 1 tablet (200 mg total) by mouth Q12H.      rosuvastatin 5 MG Oral Tab Take 1 tablet (5 mg total) by mouth nightly. 60 tablet 2    fluticasone propionate 50 MCG/ACT Nasal Suspension 2 sprays by Each Nare route daily.      Glucose Blood (FREESTYLE PRECISION GLENN TEST) In Vitro Strip Use to test blood sugar three times daily  E11.65  Precsion glenn 300 strip 3    ibuprofen 600 MG Oral Tab Take 1 tablet (600 mg total) by mouth every 8 (eight) hours as needed for Pain or Fever. 30 tablet 0         Assessment & Plan    1. Type 2 diabetes mellitus with hyperglycemia, without long-term current use of insulin ([facility]) (Primary)  Patient to continue follow-up with the diabetes clinic.  Next appointment will be on 6/5/2024.  Will need repeat hemoglobin A1c at that time.  Patient needs eye exam.  Needs a referral to ophthalmology.  Will provide.  -     Ophthalmology Referral - In Network    2. Class 3 severe obesity with serious comorbidity and body mass index (BMI) of 45.0 to 49.9 in adult, unspecified obesity type (HCC)  Patient has been losing weight on Ozempic, however plateaued.  Might need increased in Ozempic dose.  This is prescribed by her diabetes clinic. Patient aware to ask for dose increase if appropriate.    3. Essential hypertension  Patient has not been taking losartan 25 mg daily for the past 4 months.  His blood pressure in the office today is good 110/68.  He did have microalbuminuria in the past and will benefit from ARB.  Patient should continue losartan.  Refill was provided.  If he experiences symptoms or his blood pressure becomes too low, might need to decrease the dose.    4. Mixed hyperlipidemia  5. Agatston coronary artery calcium score less than 100  ASCVD risk score of 16.3%.  Patient has NOT been taking his rosuvastatin for the past 4 months.  No need to repeat lipid panel  at this time.  Refill for rosuvastatin was provided.  Patient needs to pick it up and start taking.  Needs to come back in 3-4 months for follow-up and repeat lab work.  Patient did complete calcium score and had a calcium score of 13.  Patient educated on the importance of rosuvastatin    6. Pulmonary blastomycosis (HCC)  He saw Dr. Antonio (Infectious disease) about 2 month ago, continues to take Voriconazole 200 mg BID.   Patient was started on it around November 2023.  Will need to stay on it for 6-12 month. Will need to repeat the CT scan prior to treatment discontinuation per  ID. Was supposed to do the Voriconazole levels done, but not completed yet.   - Will request records from the last visit  - Patient to complete lab work    7. Paroxysmal atrial fibrillation (HCC)  Not on metoprolol or Diltiazem (due to allergy). Not in Afib in the office today. Patient is currently on Eliquis 2.5 mg twice daily (dose was decreased because patient is currently on voriconazole).  Once patient completes voriconazole (managed by ID), he should go back to his right normal dose of 5 mg twice daily.  Needs to follow up with cardiology around 10/2024 and complete MCT 7 day prior to that visit.    8. Noncompliance with medication regimen  Patient was not taking rosuvastatin and losartan for the past 4 months.  Patient educated on the importance of taking those 2 medications.  Patient states that he is going to pick them up today and will start taking as ordered.      Follow Up: in 4-5 months for med check and cholesterol.      I spent 30 minutes obtaining pertitent medical history, reviewing pertinent imaging/labs and specialists notes, evaluating patient, discussing differential diagnosis' and various treatment options, reinforcing importance of compliance with treatment plan, and completing documentation.        Objective/ Results:   Physical Exam  Constitutional:       General: He is not in acute distress.     Appearance: Normal  appearance. He is obese. He is not ill-appearing, toxic-appearing or diaphoretic.   HENT:      Head: Normocephalic and atraumatic.      Right Ear: Tympanic membrane, ear canal and external ear normal.      Left Ear: Tympanic membrane, ear canal and external ear normal.      Mouth/Throat:      Mouth: Mucous membranes are moist.      Pharynx: Oropharynx is clear. No oropharyngeal exudate or posterior oropharyngeal erythema.   Eyes:      Extraocular Movements: Extraocular movements intact.      Conjunctiva/sclera: Conjunctivae normal.      Pupils: Pupils are equal, round, and reactive to light.   Cardiovascular:      Rate and Rhythm: Normal rate and regular rhythm.      Heart sounds: Normal heart sounds. No murmur heard.     No friction rub. No gallop.   Pulmonary:      Effort: Pulmonary effort is normal. No respiratory distress.      Breath sounds: Normal breath sounds. No stridor. No wheezing, rhonchi or rales.   Chest:      Chest wall: No tenderness.   Abdominal:      General: Abdomen is flat.   Musculoskeletal:         General: No swelling.      Cervical back: Normal range of motion.      Right lower leg: No edema.      Left lower leg: No edema.   Skin:     General: Skin is warm.      Capillary Refill: Capillary refill takes less than 2 seconds.      Coloration: Skin is not jaundiced or pale.      Findings: No bruising, erythema or lesion.   Neurological:      General: No focal deficit present.      Mental Status: He is alert and oriented to person, place, and time.      Cranial Nerves: No cranial nerve deficit.      Sensory: No sensory deficit.      Motor: No weakness.      Gait: Gait normal.   Psychiatric:         Mood and Affect: Mood normal.         Behavior: Behavior normal.         Thought Content: Thought content normal.         Judgment: Judgment normal.        Reviewed:    Patient Active Problem List    Diagnosis    Pulmonary blastomycosis (HCC)    Mixed hyperlipidemia    Noncompliance with medication  regimen    Osteoarthritis    Frequency of micturition    Agatston coronary artery calcium score less than 100    Obstructive sleep apnea syndrome    Chronic dyspnea    Class 3 severe obesity with serious comorbidity in adult (HCC)    Essential hypertension    Pneumonia, histoplasma (HCC)    Paroxysmal atrial fibrillation (HCC)    Loculated pleural effusion    Type 2 diabetes mellitus with hyperglycemia, without long-term current use of insulin (HCC)    Myopia     May 09, 2016 Entered By: TARI FERREIRA MD Comment:      Presbyopia     May 09, 2016 Entered By: TARI FERREIRA MD Comment:        Allergies   Allergen Reactions    Atorvastatin MYALGIA    Lisinopril Coughing     Ace induced cough        Social History     Socioeconomic History    Marital status:    Tobacco Use    Smoking status: Never    Smokeless tobacco: Never   Vaping Use    Vaping status: Never Used   Substance and Sexual Activity    Alcohol use: Not Currently    Drug use: Never      Review of Systems   Constitutional: Negative.    HENT: Negative.     Eyes: Negative.    Respiratory: Negative.     Cardiovascular: Negative.    Gastrointestinal: Negative.    Endocrine: Negative.    Genitourinary: Negative.    Musculoskeletal: Negative.    Neurological: Negative.      All other systems negative unless otherwise stated in ROS or HPI above.       Chloe Grayson MD  Internal Medicine       Call office with any questions or seek emergency care if necessary.   Patient understands and agrees to follow directions and advice.      ----------------------------------------- PATIENT INSTRUCTIONS-----------------------------------------     There are no Patient Instructions on file for this visit.      The 21st Century Cures Act makes medical notes available to patients in the interest of transparency.  However, please be advised that this is a medical document.  It is intended as a peer to peer communication.  It is written in medical language and may contain  abbreviations or verbiage that are technical and unfamiliar.  It may appear blunt or direct.  Medical documents are intended to carry relevant information, facts as evident, and the clinical opinion of the practitioner.

## 2024-06-05 ENCOUNTER — DIABETIC EDUCATION (OUTPATIENT)
Dept: ENDOCRINOLOGY CLINIC | Facility: CLINIC | Age: 54
End: 2024-06-05
Payer: COMMERCIAL

## 2024-06-05 VITALS — BODY MASS INDEX: 46 KG/M2 | WEIGHT: 295 LBS

## 2024-06-05 DIAGNOSIS — E11.65 TYPE 2 DIABETES MELLITUS WITH HYPERGLYCEMIA, WITHOUT LONG-TERM CURRENT USE OF INSULIN (HCC): Primary | ICD-10-CM

## 2024-06-05 PROCEDURE — G0108 DIAB MANAGE TRN  PER INDIV: HCPCS | Performed by: DIETITIAN, REGISTERED

## 2024-06-05 NOTE — PATIENT INSTRUCTIONS
Goals to work towards:    I agree that it is a good idea to start prepping your meals and snacks the night before instead of in the morning.     Start with preparing your meals in the evening for 1 week.    Use the Sample Meal Plan ideas as some options to help you get started.      Aim to follow a meal plan with a consistent amount of carbohydrates for meals and snacks:  Goal for meals is 45 grams of carbohydrates for each meal (3 meals per day)  Goal for snacks is 15 grams of carbohydrates for each snack (2-3 per day)    Aim to include carbohydrates plus protein with meals and snacks    You can also use the Plate Method as a model for your meals:  1/2 of your plate is non-starchy vegetables, 1/4 of your plate is lean protein, 1/4 of your plate is starchy or carbohydrate foods     Try to include some of the steamer bags (individual sized portions) of the frozen vegetables with your dinner meals.    Blood Glucose Goals  Blood sugar goals: less than 130 mg/dl in the morning (fasting) and less than 180 mg/dl 2 hours after meals.  Keep glucose tabs or fast acting carbohydrates with you for treatment of lows; for blood glucose levels less than 70 mg/dl, take 15 grams of fast acting carbohydrates (3-4 glucose tabs, 4 ounces of juice, or as discussed). Retest in 15 min. If not above 70 mg/dl, retreat.

## 2024-06-05 NOTE — PROGRESS NOTES
Josh Werner  : 1970 was seen for Diabetic Education, follow up visit:    Date: 2024  Referring Provider: JIMENEZ Woodard  Start time: 8:30 am End time: 9:10 am    Assessment:     Reason for visit: Here for follow up visit for diabetes education    Wt Readings from Last 6 Encounters:   24 295 lb   24 293 lb   05/10/24 (!) 302 lb   24 287 lb   23 277 lb   23 281 lb 6.4 oz         HgbA1C (%)   Date Value   2023 7.6 (H)        Current DM management:   Oral:  Metformin 500 mg twice daily  Farxiga 10 mg daily    Injectable:  Ozempic 0.5 mg once weekly  Feels like med used to curb his appetite more, but notes his hunger has been stronger, may need a stronger dose      Glucose testing at home:    Currently checking BG: Yes,  1 time/day, fasting    BG results: per patient report   FBG: most recent reading of 141 mg/dl        Diet History:  Usually eats 3 meals/day and some snacks a day. Notes wife prepares meals/polish foods, tends to serve larger portions. Feels he needs to cut back on carb content. Feels he may need to start prepping foods the night before. If eats at [employer] has eggs or a small sandwich. Notes he was overeating over the past holidays. States he has been eating more grilled chicken, not a lot of fish. Has some fruit, low on vegetables.   (a.m.) About 3 eggs, 2 slices of wheat toast, coffee with small amount of creamer, no sugar, pkt artificial sweetener  (mid-day) Gets grilled chicken if going through drive thru  (p.m.) Chicken, rice  (snacks) low sugar and higher protein snacks from [employer], sugar free gelatin, Triscuit multi grain crackers w/piece of cheese        Education:     Diabetes Overview  Discussed benefits of blood glucose control and blood glucose targets    Healthy Eating  Reviewed basic nutrition concepts for diabetes management, MyPlate. , Reviewed carbohydrate counting and label reading., Reviewed heart healthy diet (low fat, low  saturated fat, high fiber, reduced sodium), Discussed goals to start prepping meals and ways to include more non-starchy vegetables      Monitoring  Reinforced glucose monitoring schedules: Continue to check blood sugars once/day        Recommendations:   - Start prepping meals in the evening - goal to start with 1 week  - Use handout on Sample Meal Plans for ideas to help get started  - Aim to keep carbs to 45 g/meal, 15g/snack      Blood Glucose Goals  Blood sugar goals: less than 130 mg/dl in the morning (fasting) and less than 180 mg/dl 2 hours after meals.  Keep glucose tabs or fast acting carbohydrates with you for treatment of lows; for blood glucose levels less than 70 mg/dl, take 15 grams of fast acting carbohydrates (3-4 glucose tabs, 4 ounces of juice, or as discussed). Retest in 15 min. If not above 70 mg/dl, retreat.    Patients personal goals:     Start with preparing your meals in the evening for 1 week.    Use the Sample Meal Plan ideas as some options to help you get started.      Aim to follow a meal plan with a consistent amount of carbohydrates for meals and snacks:  Goal for meals is 45 grams of carbohydrates for each meal (3 meals per day)  Goal for snacks is 15 grams of carbohydrates for each snack (2-3 per day)    Aim to include carbohydrates plus protein with meals and snacks    You can also use the Plate Method as a model for your meals:  1/2 of your plate is non-starchy vegetables, 1/4 of your plate is lean protein, 1/4 of your plate is starchy or carbohydrate foods     Try to include some of the steamer bags (individual sized portions) of the frozen vegetables with your dinner meals.         Follow up:   7/30/2024 Judy Rao APRN  9/11/2024 Solange Kirkpatrick, [place]        Patient verbalized understanding and has no further questions at this time.    Solange Kirkpatrick RDN, LDN, CDCES

## 2024-07-29 NOTE — PROGRESS NOTES
Chief Complaint   Patient presents with    Diabetes     Follow up forgot meter bg in office fasting 213       Josh Werner is a 54 year old presenting today for type 2 diabetes management.   Primary care physician: Chloe Grayson MD  Last DM appt w  me    Cancelled 3-2024 appt       Most recent  A1C 9.4% ( last A1C  7.6%)   BG today 213 mg/dl   Testing 1 x daily but has not checked since last week   Reports fasting trend last week was 140 range         Had 2 appts w RD RUFINA       Has gained 20# but attributes to recent vacation and eating out more . Also   He noticed his hunger coming back    stopped Ozempic ~ 4 weeks  ago since he felt it \"didn't do much\"           Diabetes History:  Type 2 DM 2010   Patient has not had hospitalizations for blood sugar issues  denies any history of pancreatitis      Previous DM therapies:  Insulin : 2012: improved trends   Glipizide 5mg 9-2023 changed to GLP 1       Current DM Regimen:  Farxiga 10mg once daily   Metformin 500mg 1 tab twice daily   Ozempic 0.5mg subcutaneous once weekly (--> off 4 weeks)         HGBA1C:    Lab Results   Component Value Date    A1C 9.4 (A) 07/30/2024    A1C 7.6 (H) 11/08/2023    A1C 11.7 (H) 08/14/2023     (H) 11/08/2023     Lab Results   Component Value Date    CHOLEST 193 11/08/2023    TRIG 121 11/08/2023    HDL 36 (L) 11/08/2023     (H) 11/08/2023    MICROALBCREA 29.9 11/08/2023    CREATSERUM 1.11 11/08/2023    EGFRCR 78 11/08/2023    AST 12 (L) 11/08/2023    ALT 16 11/08/2023     No results found for: \"TSH\", \"T4F\"        DM Complications:  Microvascular:   Neuropathy: no  Retinopathy: no  Nephropathy: no    Macrovascular:  PVD: no  CAD: no  Stroke/CVA: no        Modifying factors:  Medication adherence: yes   Recent steroids, illness or infections ( past 3m):  No     Allergies: Atorvastatin and Lisinopril    Past Medical History:    Atherosclerosis of coronary artery    hx A fib    Diabetes (HCC)    Obesity    Pain in left  hip    Pain in right foot    Pneumonia due to COVID-19 virus     History reviewed. No pertinent surgical history.  Social History     Socioeconomic History    Marital status:    Tobacco Use    Smoking status: Never    Smokeless tobacco: Never   Vaping Use    Vaping status: Never Used   Substance and Sexual Activity    Alcohol use: Not Currently    Drug use: Never     Family History   Problem Relation Age of Onset    Diabetes Father     Diabetes Mother     Diabetes Sister     Diabetes Sister      Current Medication List:   Current Outpatient Medications   Medication Sig Dispense Refill    Continuous Glucose Sensor (FREESTYLE IRENE 3 SENSOR) Does not apply Misc 1 Device every 14 (fourteen) days. 2 each 3    Tirzepatide (MOUNJARO) 2.5 MG/0.5ML Subcutaneous Solution Pen-injector Inject 2.5 mg into the skin once a week. DX E11.9 2 mL 0    Tirzepatide 5 MG/0.5ML Subcutaneous Solution Pen-injector Inject 5 mg into the skin once a week. DX E11.9 2 mL 0    ELIQUIS 2.5 MG Oral Tab Take 1 tablet (2.5 mg total) by mouth 2 (two) times daily.      dapagliflozin (FARXIGA) 10 MG Oral Tab Take 1 tablet (10 mg total) by mouth daily. 90 tablet 1    metFORMIN 500 MG Oral Tab Take 1 tablet (500 mg total) by mouth 2 (two) times daily with meals. 180 tablet 1    omega-3-acid ethyl esters 1 g Oral Cap Take 1 capsule (1 g total) by mouth daily. 90 capsule 1    rosuvastatin 5 MG Oral Tab Take 1 tablet (5 mg total) by mouth nightly. 60 tablet 2    losartan 25 MG Oral Tab Take 1 tablet (25 mg total) by mouth daily. (Patient not taking: Reported on 7/30/2024) 90 tablet 1    voriconazole 200 MG Oral Tab Take 1 tablet (200 mg total) by mouth Q12H.      fluticasone propionate 50 MCG/ACT Nasal Suspension 2 sprays by Each Nare route daily.      Glucose Blood (FREESTYLE PRECISION GLENN TEST) In Vitro Strip Use to test blood sugar three times daily  E11.65  Precsion glenn 300 strip 3    ibuprofen 600 MG Oral Tab Take 1 tablet (600 mg total) by  mouth every 8 (eight) hours as needed for Pain or Fever. 30 tablet 0           DM associated review of  symptoms:   Endocrine: Polyuria, polyphagia, polydipsia: No   Neurological: Paresthesias: yes   HEENT: Blurred vision: no  Skin: no rash or wounds  Hematological: Hypoglycemia: no      Review of Systems     LUNGS: denies shortness of breath   + dry  cough     CARDIOVASCULAR: denies chest pain  + LE edema   GI: denies abdominal pain, nausea or diarrhea   : denies dysuria          Physical exam:  /74   Pulse 80   Resp 18   Wt (!) 300 lb 9.6 oz (136.4 kg)   SpO2 97%   BMI 47.08 kg/m²   Body mass index is 47.08 kg/m².    Physical Exam     Constitutional: Normal appearance   Cardiovascular: Normal rate    Pulmonary/Chest: Effort normal  Neurological: Alert and oriented .   Psychiatric: Normal mood and affect.         Assessment/Plan:    Hypertension  Well controlled but needs ongoing monitoring   CPM        Dyslipidemia:   Needs update on  Lipids   On rosuvastatin rx     Type 2 diabetes mellitus with hyperglycemia, without long-term current use of insulin ([facility])  A1C: 9.4 % ( last A1C 7.6% )   Weight  300  lb ( Last DM center weight: 280  lb     Diabetes control is poorly controlled   Reviewed with patient health impact associated with high glucose trends and the importance of better glucose control to prevent onset /progression of DM complications.     Requesting to trial Mounjaro rx.     Start Mounjaro 2.5mg subcutaneous once weekly and after 4 weeks, increase dose to 5mg once weekly     Desires to pursue OOP cost for valentín 3 Cgm - ordered today and will return w [facility] next week for start up         Continue  Farxiga 10mg once daily   Metformin 500mg 1 tab twice daily     If diarrhea increases w starting mounjaro, would stop metformin rx       Reviewed clinical significance of A1c, adverse effects of suboptimal glucose control, and goals of therapy   Reviewed the A1C test, what the value reflects and the  goal for the patient.   Reminded pt on A1C and blood sugar targets (Fasting < 130 and post prandial <180 ) and complications associated with hyperglycemia and uncontrolled DM (on AVS)   Recommended SMBG 1-2 - x daily if not using CGM (fasting, pre lunch and 2hr after dinner)     Continue with lifestyle modifications since they have positive impact on diabetes/blood sugars/health (portion control, physical activity, weight loss)   Reinforced timing and adherence with medication, self-monitoring of blood glucose and routine follow up    Recommended for  patient to follow up in Diabetes center to review carb goals, food label reading, and offer further support/guidance with exercise planning and weight loss.   2 week w [facility] for irene start   6 week f/u w me and in office return in 3 - 4 m   but recommended to contact DM clinic sooner if questions or concerns.    The patient indicates understanding of these issues and agrees to the plan.      Orders Placed This Encounter    HEMOGLOBIN A1C     Order Specific Question:   Release to patient     Answer:   Immediate    ASSAY QUANTITATIVE, GLUCOSE     Order Specific Question:   Release to patient     Answer:   Immediate    Microalb/Creat Ratio, Random Urine     Standing Status:   Future     Standing Expiration Date:   2025     Order Specific Question:   Release to patient     Answer:   Immediate    Continuous Glucose Sensor (FREESTYLE IRENE 3 SENSOR) Does not apply Misc     Si Device every 14 (fourteen) days.     Dispense:  2 each     Refill:  3     Patient paying cash    Tirzepatide (MOUNJARO) 2.5 MG/0.5ML Subcutaneous Solution Pen-injector     Sig: Inject 2.5 mg into the skin once a week. DX E11.9     Dispense:  2 mL     Refill:  0    Tirzepatide 5 MG/0.5ML Subcutaneous Solution Pen-injector     Sig: Inject 5 mg into the skin once a week. DX E11.9     Dispense:  2 mL     Refill:  0     To start after 4 weeks of 2.5mg Mounjaro     DM quality  A1C/Blood pressure: as  reported above   Nephropathy screenin  not currently on  ace /arb rx.   LIPID screenin  not currently on statin rx.   Last dilated eye exam: No data recorded Exam shows retinopathy? No data recorded  Last diabetic foot exam: Last Foot Exam: 11/15/23      Declined retina camera AND urine collection due to time constraint today

## 2024-07-30 ENCOUNTER — OFFICE VISIT (OUTPATIENT)
Dept: ENDOCRINOLOGY CLINIC | Facility: CLINIC | Age: 54
End: 2024-07-30
Payer: COMMERCIAL

## 2024-07-30 VITALS
WEIGHT: 300.63 LBS | DIASTOLIC BLOOD PRESSURE: 74 MMHG | HEART RATE: 80 BPM | BODY MASS INDEX: 47 KG/M2 | SYSTOLIC BLOOD PRESSURE: 130 MMHG | OXYGEN SATURATION: 97 % | RESPIRATION RATE: 18 BRPM

## 2024-07-30 DIAGNOSIS — E11.65 TYPE 2 DIABETES MELLITUS WITH HYPERGLYCEMIA, WITHOUT LONG-TERM CURRENT USE OF INSULIN (HCC): Primary | ICD-10-CM

## 2024-07-30 DIAGNOSIS — E66.01 CLASS 3 SEVERE OBESITY WITH SERIOUS COMORBIDITY IN ADULT, UNSPECIFIED BMI, UNSPECIFIED OBESITY TYPE (HCC): ICD-10-CM

## 2024-07-30 DIAGNOSIS — E78.5 DYSLIPIDEMIA: ICD-10-CM

## 2024-07-30 DIAGNOSIS — I10 ESSENTIAL HYPERTENSION: ICD-10-CM

## 2024-07-30 LAB
GLUCOSE BLOOD: 213
HEMOGLOBIN A1C: 9.4 % (ref 4.3–5.6)
TEST STRIP LOT #: NORMAL NUMERIC

## 2024-07-30 PROCEDURE — 82947 ASSAY GLUCOSE BLOOD QUANT: CPT | Performed by: NURSE PRACTITIONER

## 2024-07-30 PROCEDURE — 3078F DIAST BP <80 MM HG: CPT | Performed by: NURSE PRACTITIONER

## 2024-07-30 PROCEDURE — 99214 OFFICE O/P EST MOD 30 MIN: CPT | Performed by: NURSE PRACTITIONER

## 2024-07-30 PROCEDURE — 83036 HEMOGLOBIN GLYCOSYLATED A1C: CPT | Performed by: NURSE PRACTITIONER

## 2024-07-30 PROCEDURE — 3075F SYST BP GE 130 - 139MM HG: CPT | Performed by: NURSE PRACTITIONER

## 2024-07-30 RX ORDER — BLOOD-GLUCOSE SENSOR
1 EACH MISCELLANEOUS
Qty: 2 EACH | Refills: 3 | Status: SHIPPED | OUTPATIENT
Start: 2024-07-30

## 2024-07-30 RX ORDER — TIRZEPATIDE 2.5 MG/.5ML
2.5 INJECTION, SOLUTION SUBCUTANEOUS WEEKLY
Qty: 2 ML | Refills: 0 | Status: SHIPPED | OUTPATIENT
Start: 2024-07-30

## 2024-07-30 NOTE — PATIENT INSTRUCTIONS
A1C 9.4% ( up from 7.6%)     Corrie sensors were ordered at [facility] - max cost should be 80$       Continue  Farxiga 10mg once daily   Metformin 500mg 1 tab twice daily       Start Mounjaro:     It works in the following ways:     Helps the body release insulin when the blood sugar is high - after eating   Helps the body remove excess sugar from the blood  Stops the liver from making and releasing too much sugar   Lowers your appetite   Slows down how quickly the food leaves the stomach -resulting in you feeling full for longer time periods.       Dose instructions:     Mounjaro 2.5mg once weekly for four weeks  - after 4 weeks increase to 5mg once weekly if no stomach issues     We can continue to increase to max dose of 15mg weekly        Each of the Mounjaro  doses must be taken for a minimum of four weeks. The dose is increased slowly to allow you time  to ensure you can tolerate the medication.   The dose selected depends on how patients are tolerating it, what their weight loss goals are, and what their blood sugars are.   If you are wanting a dose adjustment, we will need to have an office visit in order to determine if appropriate to increase the dose, and what other medications would need adjusting. This visit can be done as an office visit or video visit through [employer].     One of the most common side effects of this  medications is nausea and GI upset.   Here is some advice to reduce these side effects:  - if you're having nausea and ate a large meal before the nausea, the volume of food may be too much for your stomach to handle, reduce meal size  - practice mindfulness to stop eating once full  - avoid eating when not hungry  - avoid high-fat or spicy food (particularly during the initial dose-escalation period)  - moderate your intake of alcohol and fizzy drinks (particularly in the context of nausea and heartburn)  - If you have constipation, be sure you are drinking enough water and eating enough  fiber  - if you notice some nausea symptoms and haven't eaten in a while, make sure you are eating at least three meals per day, can try snacking on high protein snack item like yogurt, string cheese, cottage cheese, protein shake  - If you have made these changes and you still notice nausea, please contact the clinic    How to use mounjaro: [email or web address]

## 2024-08-06 ENCOUNTER — TELEPHONE (OUTPATIENT)
Dept: INTERNAL MEDICINE CLINIC | Facility: CLINIC | Age: 54
End: 2024-08-06

## 2024-08-14 RX ORDER — APIXABAN 2.5 MG/1
2.5 TABLET, FILM COATED ORAL 2 TIMES DAILY
Refills: 0 | OUTPATIENT
Start: 2024-08-14

## 2024-08-14 NOTE — TELEPHONE ENCOUNTER
This medication is managed by Cardiologist.    Requested Prescriptions     Refused Prescriptions Disp Refills    ELIQUIS 2.5 MG Oral Tab  0     Sig: Take 1 tablet (2.5 mg total) by mouth 2 (two) times daily.     Refused By: ESTEFANIA GRIMM     Reason for Refusal: Prescriber not at this practice

## 2024-08-26 NOTE — TELEPHONE ENCOUNTER
Requested Prescriptions     Pending Prescriptions Disp Refills    metFORMIN 500 MG Oral Tab 180 tablet 1     Sig: Take 1 tablet (500 mg total) by mouth 2 (two) times daily with meals.     HGBA1C:    Lab Results   Component Value Date    A1C 9.4 (A) 07/30/2024    A1C 7.6 (H) 11/08/2023    A1C 11.7 (H) 08/14/2023     (H) 11/08/2023     Your Appointments      Wednesday September 04, 2024 8:15 AM  Nurse Visit with [facility] DI[facility] NURSE  [facility], [address] [place] [address] ([facility] [place]) [address] [facility] [address] [facility] [address] [place] [phone]        Wednesday September 04, 2024 8:30 AM  Nurse Visit with [facility] RETINAL CAMERA  [address] ([facility] [place]) [address] [facility] [address] [place] [phone]        Thursday September 19, 2024 8:15 AM  Video Visit with JIMENEZ Colón  [address] ([facility] [place]) [address] [facility] [address] [place] [address] [phone]   Please verify your telehealth insurance benefits prior to your appointment.    You must be in the [facility] during the virtual visit.     Please use the [employer] and launch the video visit 10 minutes prior to your scheduled appointment time to ensure your camera and microphone are working properly. Once the video visit has started you will be placed in a waiting room until the provider begins the visit.     You will receive an email confirmation with instructions.  If you have questions, call your doctor's office directly.    If you are having issues or need to use a desktop/laptop, please follow the below steps:        1.       Close out all other open apps (could be competing for audio resources)  2.       Disable Bluetooth  3.       Reboot mobile device before joining the video  4.       Come off Wi-Fi and switch over to Data    Please see our Video Visit  Tip Sheet if you need additional assistance.     If you believe this is an emergency, please dial 911 immediately.                Last OFFICE VISIT: 7--CB    Last Refill :   2-5-2024- 180 tabs with 1 refills

## 2024-08-29 ENCOUNTER — PATIENT MESSAGE (OUTPATIENT)
Dept: ENDOCRINOLOGY CLINIC | Facility: CLINIC | Age: 54
End: 2024-08-29

## 2024-08-29 DIAGNOSIS — E11.65 TYPE 2 DIABETES MELLITUS WITH HYPERGLYCEMIA, WITHOUT LONG-TERM CURRENT USE OF INSULIN (HCC): Primary | ICD-10-CM

## 2024-08-30 RX ORDER — BLOOD-GLUCOSE SENSOR
1 EACH MISCELLANEOUS
Qty: 3 EACH | Refills: 2 | Status: SHIPPED | OUTPATIENT
Start: 2024-08-30

## 2024-08-30 NOTE — TELEPHONE ENCOUNTER
Patient would like to try to see if Dexcom is covered- pended if agreeable- advised may not be covered by insurance- generally higher out of pocket if not covered.     Also advised to reach out to Corrie regarding failed sensors.     Last office visit and Provider: 7/30/24 - CB  Future Appointments   Date Time Provider Department Center   9/4/2024  8:15 AM [facility] DI[facility] NURSE [facility]   9/4/2024  8:30 AM [facility]   9/19/2024  8:15 AM Judy Rao APRN [facility]   Last A1c value was 9.4% done 7/30/2024.

## 2024-08-30 NOTE — TELEPHONE ENCOUNTER
From: Josh Werner  To: Judy Rao  Sent: 8/29/2024 11:21 PM CDT  Subject: Change sensor    Indira Kim ! Just would like to inform you that  The Corrie 3 sensor was working fine but unfortunately they stop working within a week instead of 14 days, the last one only lasted me 2 days! Then my phone started beeping saying it was defective and must be replaced. Is it possible you can prescribe the Dexcom G7 instead? Let me know.    Thank you,    Josh Werner

## 2024-09-04 ENCOUNTER — NURSE ONLY (OUTPATIENT)
Dept: ENDOCRINOLOGY CLINIC | Facility: CLINIC | Age: 54
End: 2024-09-04
Payer: COMMERCIAL

## 2024-09-04 ENCOUNTER — NURSE ONLY (OUTPATIENT)
Dept: INTERNAL MEDICINE CLINIC | Facility: CLINIC | Age: 54
End: 2024-09-04
Payer: COMMERCIAL

## 2024-09-04 DIAGNOSIS — E11.65 TYPE 2 DIABETES MELLITUS WITH HYPERGLYCEMIA, WITHOUT LONG-TERM CURRENT USE OF INSULIN (HCC): Primary | ICD-10-CM

## 2024-09-04 DIAGNOSIS — E11.65 TYPE 2 DIABETES MELLITUS WITH HYPERGLYCEMIA, WITHOUT LONG-TERM CURRENT USE OF INSULIN (HCC): ICD-10-CM

## 2024-09-04 LAB — HEMOGLOBIN A1C: 8.7 % (ref 4.3–5.6)

## 2024-09-04 PROCEDURE — 92229 IMG RTA DETC/MNTR DS POC ALY: CPT

## 2024-09-04 PROCEDURE — 83036 HEMOGLOBIN GLYCOSYLATED A1C: CPT | Performed by: NURSE PRACTITIONER

## 2024-09-04 PROCEDURE — 2026F EYE IMG VALID EVC RTNOPTHY: CPT

## 2024-09-04 NOTE — PROGRESS NOTES
Corrie 3 placed by CDE, patient is connected to clinic.    A continuous glucose sensor for 24 hour blood sugar monitoring was placed on patient today per AP[facility] order.   Serial NUMBER: [id number]     Exp date:  10/31/2024  After cleansing skin with alcohol prep, Sensor (F98)was inserted on  R Posterior upper arm area without difficulty. Small amount of skin prep was added around sensor tape after placement to help with sensor adhesive.    Area remains free of any bleeding or irritation or pain at site when patient left DM center.  Patient instructions:   Record daily food/drink intake and activity in log book  Call Diabetes center with any questions or concerns.   instructed to record food, exercise, insulin doses (if taking) on log provided

## 2024-09-05 ENCOUNTER — TELEPHONE (OUTPATIENT)
Dept: ENDOCRINOLOGY CLINIC | Facility: CLINIC | Age: 54
End: 2024-09-05

## 2024-09-09 ENCOUNTER — TELEPHONE (OUTPATIENT)
Dept: ENDOCRINOLOGY CLINIC | Facility: CLINIC | Age: 54
End: 2024-09-09

## 2024-09-09 NOTE — TELEPHONE ENCOUNTER
Received PA for Corrie 3 sensor pt got approved for Dexcom G7 in TE 9/5/24     Pt picked up Corrie 3 on 7/30/24 for 2 dispensed - sending MCM to confirm which sensor pt is going to us

## 2024-09-10 NOTE — TELEPHONE ENCOUNTER
Pt was approved for Dexcom in TE 9/5/24 from 8/6/24-9/5/24 will contact pharmacy to find out price for CGM -contacted pill pack spoke to pharmacy bradley Neumann to check  cost for patient 70.00 $ for 30 days supply for 3 sensor       Stelo is $90-  states pt can decided if he was to stick with Dexcom or Valeria

## 2024-09-18 LAB — AMB EXT GMI: 7.4 %

## 2024-09-18 NOTE — PROGRESS NOTES
Name: Josh Werner  YOB: 1970  Report Period: 08/08/2024 - 09/04/2024 (28 days)  Generated: 09/18/2024  % Time CGM Active: 37%      Glucose Statistics and Targets  Average Glucose: 171 mg/dL  Glucose Management Indicator (GMI): 7.4%  Glucose Variability (%CV): 20.0%  Target Range: 70 - 180 mg/dL      Time in Ranges  Very High: >250 mg/dL --- 3%  High: 181 - 250 mg/dL --- 26%  Target Range: 70 - 180 mg/dL --- 71%  Low: 54 - 69 mg/dL --- 0%  Very Low: <54 mg/dL --- 0%

## 2024-09-18 NOTE — PROGRESS NOTES
Telehealth visit: Please note that the following visit was completed using two-way, real-time interactive audio and video communication.  Time Spent:  19 min       No chief complaint on file.      Josh Werner is a 54 year old presenting today for type 2 diabetes management.   Primary care physician: Chloe Grayson MD  Last DM appt w me 7.2024   Started Mounjaro       Most recent  A1C 8.7% ( last A1C 9.4%)   Started on corrie 3 cgm but insurance preferred dexcom   Also had several sensor fail w corrie      Approved for dexcom G7 - but arriving today     Has not yet made opth appt (+ DR detected on retina camera 9-2024)     Has been out of farxiga ~ 1 week   Needs refill -  Tolerating Mounjaro  --> improved trends and noticed decreased appetite.     Asking how he gets eliquis refill - was denied at pharmacy --> over 1 year w cardiology ( + Afib)         GMI 7.4%     Reviewed CGM report/trends w patient today:  Corrie   (full download in media)   Sensor active time: 37 %    4 week (28-30 days)  GMI 7.4 %    Average glucose: 171 mg/dl     Hypoglycemia 0%    TIR 71 %  (ADA recommended goal > 70%)   Variability WNL ( < 33%)   Some post breakfast spikes     Name: Josh Werner  YOB: 1970  Report Period: 08/08/2024 - 09/04/2024 (28 days)  Generated: 09/19/2024  % Time CGM Active: 54%      Glucose Statistics and Targets  Average Glucose: 171 mg/dL  Glucose Management Indicator (GMI): 7.4%  Glucose Variability (%CV): 20.0%  Target Range: 70 - 180 mg/dL      Time in Ranges  Very High: >250 mg/dL --- 3%  High: 181 - 250 mg/dL --- 26%  Target Range: 70 - 180 mg/dL --- 71%  Low: 54 - 69 mg/dL --- 0%  Very Low: <54 mg/dL --- 0%      Diabetes History:  Type 2 DM 2010   Patient has not had hospitalizations for blood sugar issues  denies any history of pancreatitis      Previous DM therapies:  Insulin : 2012: improved trends   Glipizide 5mg 9-2023 changed to GLP 1   Ozempic: lack of effect, change in therapy      Current DM Regimen:  Farxiga 10mg once daily (--> off past week)   Metformin 500mg 1 tab twice daily   Mounjaro 5mg  subcutaneous once weekly         HGBA1C:    Lab Results   Component Value Date    A1C 8.7 (A) 09/04/2024    A1C 9.4 (A) 07/30/2024    A1C 7.6 (H) 11/08/2023     (H) 11/08/2023       Lab Results   Component Value Date    CHOLEST 193 11/08/2023    TRIG 121 11/08/2023    HDL 36 (L) 11/08/2023     (H) 11/08/2023     Lab Results   Component Value Date    MICROALBCREA 29.9 11/08/2023    MICROALBCREA 31.1 (H) 09/12/2023      Lab Results   Component Value Date    CREATSERUM 1.11 11/08/2023    CREATSERUM 0.89 09/04/2023    EGFRCR 78 11/08/2023    EGFRCR 101 09/04/2023     Lab Results   Component Value Date    AST 12 (L) 11/08/2023    AST 12 (L) 09/01/2023    ALT 16 11/08/2023    ALT 22 09/01/2023       No results found for: \"TSH\", \"T4F\"    No results found for: \"TSH\", \"T4F\"        DM Complications:  Microvascular:   Neuropathy: no  Retinopathy: no  Nephropathy: no    Macrovascular:  PVD: no  CAD: no  Stroke/CVA: no        Modifying factors:  Medication adherence NO  - see above   Recent steroids, illness or infections ( past 3m):  No     Allergies: Atorvastatin and Lisinopril    Past Medical History:    Atherosclerosis of coronary artery    hx A fib    Diabetes (HCC)    Obesity    Pain in left hip    Pain in right foot    Pneumonia due to COVID-19 virus     No past surgical history on file.  Social History     Socioeconomic History    Marital status:    Tobacco Use    Smoking status: Never    Smokeless tobacco: Never   Vaping Use    Vaping status: Never Used   Substance and Sexual Activity    Alcohol use: Not Currently    Drug use: Never     Family History   Problem Relation Age of Onset    Diabetes Father     Diabetes Mother     Diabetes Sister     Diabetes Sister      Current Medication List:   Current Outpatient Medications   Medication Sig Dispense Refill    Tirzepatide (MOUNJARO)  7.5 MG/0.5ML Subcutaneous Solution Pen-injector Inject 7.5 mg into the skin once a week for 4 doses. 2 mL 0    dapagliflozin (FARXIGA) 10 MG Oral Tab Take 1 tablet (10 mg total) by mouth daily. 90 tablet 1    Continuous Glucose Sensor (DEXCOM G7 SENSOR) Does not apply Misc One sensor every 10 days 3 each 5    Continuous Glucose Sensor (DEXCOM G7 SENSOR) Does not apply Misc 1 each Every 10 days. 9 each 0    Continuous Glucose Sensor (DEXCOM G7 SENSOR) Does not apply Misc 1 each Every 10 days. Use as directed every 10 days 3 each 2    metFORMIN 500 MG Oral Tab Take 1 tablet (500 mg total) by mouth 2 (two) times daily with meals. 180 tablet 1    Continuous Glucose Sensor (FREESTYLE IRENE 3 SENSOR) Does not apply Misc 1 Device every 14 (fourteen) days. 2 each 3    ELIQUIS 2.5 MG Oral Tab Take 1 tablet (2.5 mg total) by mouth 2 (two) times daily.      losartan 25 MG Oral Tab Take 1 tablet (25 mg total) by mouth daily. (Patient not taking: Reported on 7/30/2024) 90 tablet 1    omega-3-acid ethyl esters 1 g Oral Cap Take 1 capsule (1 g total) by mouth daily. 90 capsule 1    voriconazole 200 MG Oral Tab Take 1 tablet (200 mg total) by mouth Q12H.      rosuvastatin 5 MG Oral Tab Take 1 tablet (5 mg total) by mouth nightly. 60 tablet 2    fluticasone propionate 50 MCG/ACT Nasal Suspension 2 sprays by Each Nare route daily.      Glucose Blood (FREESTYLE PRECISION GLENN TEST) In Vitro Strip Use to test blood sugar three times daily  E11.65  Precsion glenn 300 strip 3    ibuprofen 600 MG Oral Tab Take 1 tablet (600 mg total) by mouth every 8 (eight) hours as needed for Pain or Fever. 30 tablet 0           DM associated review of  symptoms:   Endocrine: Polyuria, polyphagia, polydipsia: No   Neurological: Paresthesias: yes   HEENT: Blurred vision: no  Skin: no rash or wounds  Hematological: Hypoglycemia: no      Review of Systems     LUNGS: denies shortness of breath     CARDIOVASCULAR: denies chest pain  + LE edema   GI: denies  abdominal pain, nausea or diarrhea   : denies dysuria          Physical exam:  There were no vitals taken for this visit.  There is no height or weight on file to calculate BMI.    Physical Exam     Constitutional: Normal appearance   Cardiovascular: Not assessed today   Pulmonary/Chest: Effort normal  Neurological: Alert and oriented .   Psychiatric: Normal mood and affect.         Assessment/Plan:    Hypertension  needs ongoing monitoring   CPM        CAD/Afib  Contact PCP or cardiology for eliquis refill  Reminded importance of medication adherence       Dyslipidemia:   Reminded again to update labs    On rosuvastatin rx     Type 2 diabetes mellitus with hyperglycemia, without long-term current use of insulin ([facility])  A1C: 8.7% ( last A1C 9.4% )    ( Last DM center weight: 300  lb )   Repeat A1C again in 2m ; due to improving GMI on CGM     Reviewed with patient health impact associated with high glucose trends and the importance of better glucose control to prevent onset /progression of DM complications.       Increase  Mounjaro  5mg --> 7.5mg subcutaneous once weekly --.>And after 4 weeks , increase mounjaro to 10mg subcutaneous once weekly     Contact us when Dexcom G7 is received - provided pt with Dexcom resources for self start -       Continue  Farxiga 10mg once daily   Metformin 500mg 1 tab twice daily     Reviewed clinical significance of A1c, adverse effects of suboptimal glucose control, and goals of therapy   Reviewed the A1C test, what the value reflects and the goal for the patient.   Reminded pt on A1C and blood sugar targets (Fasting < 130 and post prandial <180 ) and complications associated with hyperglycemia and uncontrolled DM (on AVS)   Recommended SMBG 1-2 - x daily if not using CGM (fasting, pre lunch and 2hr after dinner)     Continue with lifestyle modifications since they have positive impact on diabetes/blood sugars/health (portion control, physical activity, weight loss)   Reinforced  timing and adherence with medication, self-monitoring of blood glucose and routine follow up    Recommended for  patient to follow up in Diabetes center to review carb goals, food label reading, and offer further support/guidance with exercise planning and weight loss.   Update labs before next appt   4- 6  week f/u w me as follow up  but recommended to contact DM clinic sooner if questions or concerns.    The patient indicates understanding of these issues and agrees to the plan.      Orders Placed This Encounter    Comp Metabolic Panel (14)     Standing Status:   Future     Standing Expiration Date:   2025    Hemoglobin A1C     Standing Status:   Future     Standing Expiration Date:   2025    Microalb/Creat Ratio, Random Urine     Standing Status:   Future     Standing Expiration Date:   2025    Lipid Panel     Standing Status:   Future     Standing Expiration Date:   2025    TSH W Reflex To Free T4     Standing Status:   Future     Standing Expiration Date:   2025     Order Specific Question:   Release to patient     Answer:   Immediate    GLUCOSE MANAGEMENT INDICATOR     This external order was created through the Results Console.     Order Specific Question:   Release to patient     Answer:   Immediate    Tirzepatide (MOUNJARO) 7.5 MG/0.5ML Subcutaneous Solution Pen-injector     Sig: Inject 7.5 mg into the skin once a week for 4 doses.     Dispense:  2 mL     Refill:  0     Dx e11.9    dapagliflozin (FARXIGA) 10 MG Oral Tab     Sig: Take 1 tablet (10 mg total) by mouth daily.     Dispense:  90 tablet     Refill:  1     BIN:[id number] PCN:[id number] GR: [id number] ID:[id number]     Diabetes complications & risks surveillance:     A1C/Blood pressure: as reported   Nephropathy screenin  not currently on  ace /arb rx.   LIPID screenin  not currently on statin rx.   Last dilated eye exam: Last Dilated Eye Exam: 24 (retina camera)   Exam shows retinopathy? Eye Exam shows  Diabetic Retinopathy?: Yes  Last diabetic foot exam: Last Foot Exam: 11/15/23      9-4-2024 retina camera: + Dr - referred to opth             This has been done in good marty to provide continuity of care in the best interest of the provider-patient relationship, due to the on-going public health crisis/national emergency and because of restrictions of visitation.  There are limitations of this visit as no or only very limited physical exam could be performed.  Every conscious effort was taken to allow for sufficient and adequate time.  This billing visit was spent on reviewing blood sugar trends, DM related labs, medications and decision making.  Appropriate medical decision-making and tests are ordered as detailed in the plan of care above.      Josh Werner understands phone or video evaluation is not a substitute for face-to-face examination or emergency care. Patient advised to go to ER or call 911 for worsening symptoms or acute distress.     Judy Rao, APRN

## 2024-09-19 ENCOUNTER — TELEMEDICINE (OUTPATIENT)
Dept: ENDOCRINOLOGY CLINIC | Facility: CLINIC | Age: 54
End: 2024-09-19
Payer: COMMERCIAL

## 2024-09-19 DIAGNOSIS — I10 ESSENTIAL HYPERTENSION: ICD-10-CM

## 2024-09-19 DIAGNOSIS — E78.5 DYSLIPIDEMIA: ICD-10-CM

## 2024-09-19 DIAGNOSIS — E66.01 CLASS 3 SEVERE OBESITY WITH SERIOUS COMORBIDITY IN ADULT, UNSPECIFIED BMI, UNSPECIFIED OBESITY TYPE (HCC): ICD-10-CM

## 2024-09-19 DIAGNOSIS — I48.0 PAROXYSMAL ATRIAL FIBRILLATION (HCC): ICD-10-CM

## 2024-09-19 DIAGNOSIS — E11.65 TYPE 2 DIABETES MELLITUS WITH HYPERGLYCEMIA, WITHOUT LONG-TERM CURRENT USE OF INSULIN (HCC): Primary | ICD-10-CM

## 2024-09-19 PROCEDURE — 99214 OFFICE O/P EST MOD 30 MIN: CPT | Performed by: NURSE PRACTITIONER

## 2024-09-19 PROCEDURE — 95251 CONT GLUC MNTR ANALYSIS I&R: CPT | Performed by: NURSE PRACTITIONER

## 2024-09-19 RX ORDER — TIRZEPATIDE 7.5 MG/.5ML
7.5 INJECTION, SOLUTION SUBCUTANEOUS WEEKLY
Qty: 2 ML | Refills: 0 | Status: SHIPPED | OUTPATIENT
Start: 2024-09-19 | End: 2024-10-11

## 2024-09-19 RX ORDER — APIXABAN 2.5 MG/1
2.5 TABLET, FILM COATED ORAL 2 TIMES DAILY
Refills: 0 | OUTPATIENT
Start: 2024-09-19

## 2024-09-19 RX ORDER — DAPAGLIFLOZIN 10 MG/1
10 TABLET, FILM COATED ORAL DAILY
Qty: 90 TABLET | Refills: 1 | Status: SHIPPED | OUTPATIENT
Start: 2024-09-19

## 2024-09-19 NOTE — PATIENT INSTRUCTIONS
A1C 8.7%   Trends are improving  Estimated A1C on valentín past 28 days: 7.4%   Your trends are better but I would like to see your A1C under 7%     The retina picture we took of your eyes found some abnormal findings indicating diabetic retinopathy on the image.Please schedule an appointment with an eye specialist to evaluate the severity of the retinopathy as your earliest convenience.   Recommended ophthalmologist       Dr Coyle   Office contact: [phone]  Or   [facility]    Phone: [phone]        Update labs in 1 m (blood and urine)   Ordered for any [facility] lab location -     I have refilled your farxiga 10mg once daily     Please contact your cardiologist - to get refill on Eliquis--> or PCP   With atrial fibrillation, it is important to stay on your blood thinners     For diabetes:   With next refill, Increase Mounjaro to 7.5mg once weekly x 4 weeks  Then, we can increase Mounjaro 10mg if no stomach  side effects       Continue     Farxiga 10mg once daily   Metformin 500mg 1 tab twice daily     Visit [email or web address].com - there are some tutorials - to help you get started.   Upload the dexcom michelle on your smart phone -     Let us know when you get started on dexcom so you can get connected w our diabetes center     Here is some info on how to connect with us      In order to get connected with us, you will need  dexcom G7 michelle   Create a user name/password      In the clarity michelle, there will be 4 icons on the bottom.   The furthest right icon should say Profile. You will select that.   Then select the top option - manage data sharing. It will prompt you for the clinic code which is [id number] and select confirm     Please know that we are not notified of high or low trends - in dexcom   Always reach out to us for any blood sugar concerns      Fluctuations in blood sugars are best detected by testing your sugar with your meter.   In order for me to determine any patterns in your blood sugars, you will need to  test your blood sugar 1- 2 times daily     It would be best to change up the times of day that you are testing your sugar.   Always test before breakfast (fasting) and then alternate testing blood sugar 1-2 hours after your meals.     American Diabetes Association: blood sugar targets:     Fasting blood sugar (before breakfast) Target:    (ideally less than 110)  2 hours after eating less than 180 (ideally less than  150 )     Call for blood sugars less than  75 or greater than  200 more than 2 times in a week     If you are wearing the sensor, please look at your trends/averages    Recommendations:   GMI (estimated A1C ) target under  7%     Time in range (healthy blood sugar targets) : goal is over 70%   less than 70 : goal is less than 4%   Over 180 : goal is less than 20 %   Over 250: goal is  less than 5%

## 2024-09-20 ENCOUNTER — PATIENT MESSAGE (OUTPATIENT)
Dept: ENDOCRINOLOGY CLINIC | Facility: CLINIC | Age: 54
End: 2024-09-20

## 2024-09-20 NOTE — TELEPHONE ENCOUNTER
Patient was trying to send glucose readings- confirming if patient is using reader or phone michelle.     Advised patient on steps below to share Dexcom data via phone.

## 2024-09-25 NOTE — TELEPHONE ENCOUNTER
Appears that patient is using Dexcom G7 michelle rather than Dexcom Clarity, explained difference between apps to patient and provided steps to get Dexcom data sharing with Clinic via Clarity michelle

## 2024-10-11 ENCOUNTER — NURSE ONLY (OUTPATIENT)
Dept: INTERNAL MEDICINE CLINIC | Facility: CLINIC | Age: 54
End: 2024-10-11
Payer: COMMERCIAL

## 2024-10-11 PROCEDURE — 90471 IMMUNIZATION ADMIN: CPT | Performed by: STUDENT IN AN ORGANIZED HEALTH CARE EDUCATION/TRAINING PROGRAM

## 2024-10-11 PROCEDURE — 90656 IIV3 VACC NO PRSV 0.5 ML IM: CPT | Performed by: STUDENT IN AN ORGANIZED HEALTH CARE EDUCATION/TRAINING PROGRAM

## 2024-10-16 ENCOUNTER — IMMUNIZATION (OUTPATIENT)
Dept: LAB | Age: 54
End: 2024-10-16
Attending: EMERGENCY MEDICINE
Payer: COMMERCIAL

## 2024-10-16 DIAGNOSIS — Z23 NEED FOR VACCINATION: Primary | ICD-10-CM

## 2024-10-16 PROCEDURE — 90480 ADMN SARSCOV2 VAC 1/ONLY CMP: CPT

## 2024-11-19 NOTE — TELEPHONE ENCOUNTER
Requested Prescriptions     Pending Prescriptions Disp Refills    metFORMIN 500 MG Oral Tab 180 tablet 1     Sig: Take 1 tablet (500 mg total) by mouth 2 (two) times daily with meals.     Future Appointments   Date Time Provider Department Center   12/19/2024  7:40 AM Chloe Grayson MD [facility]     Last A1c value was 8.7% done 9/4/2024.  Refill 8/26/24 [facility]  LOV 7/30/24 [facility]

## 2024-11-20 RX ORDER — APIXABAN 2.5 MG/1
2.5 TABLET, FILM COATED ORAL 2 TIMES DAILY
Refills: 0 | OUTPATIENT
Start: 2024-11-20

## 2024-12-17 ENCOUNTER — TELEPHONE (OUTPATIENT)
Dept: INTERNAL MEDICINE CLINIC | Facility: CLINIC | Age: 54
End: 2024-12-17

## 2024-12-17 DIAGNOSIS — E11.65 TYPE 2 DIABETES MELLITUS WITH HYPERGLYCEMIA, WITHOUT LONG-TERM CURRENT USE OF INSULIN (HCC): ICD-10-CM

## 2024-12-17 RX ORDER — DAPAGLIFLOZIN 10 MG/1
10 TABLET, FILM COATED ORAL DAILY
Qty: 90 TABLET | Refills: 1 | Status: SHIPPED | OUTPATIENT
Start: 2024-12-17

## 2024-12-17 NOTE — TELEPHONE ENCOUNTER
Requested Prescriptions     Pending Prescriptions Disp Refills    dapagliflozin (FARXIGA) 10 MG Oral Tab 90 tablet 1     Sig: Take 1 tablet (10 mg total) by mouth daily.     Future Appointments   Date Time Provider Department Center   12/19/2024  7:40 AM Chloe Grayson MD [facility]     Last A1c value was 8.7% done 9/4/2024.  Refill 09/19/24 [facility]   LOV 9/9/24 [facility]

## 2025-01-14 ENCOUNTER — TELEPHONE (OUTPATIENT)
Dept: ENDOCRINOLOGY CLINIC | Facility: CLINIC | Age: 55
End: 2025-01-14

## 2025-01-14 NOTE — TELEPHONE ENCOUNTER
Last A1c value was 8.7% done 9/4/2024.    Cancelled  follow up   Has not completed labs OR made his follow up appointment   Refill requested  for farxiga     Warning letter to pt

## 2025-03-24 ENCOUNTER — TELEPHONE (OUTPATIENT)
Dept: INTERNAL MEDICINE CLINIC | Facility: CLINIC | Age: 55
End: 2025-03-24

## 2025-04-25 NOTE — TELEPHONE ENCOUNTER
Requested Prescriptions     Pending Prescriptions Disp Refills    metFORMIN 500 MG Oral Tab 180 tablet 1     Sig: Take 1 tablet (500 mg total) by mouth 2 (two) times daily with meals.     HGBA1C:    Lab Results   Component Value Date    A1C 8.7 (A) 09/04/2024    A1C 9.4 (A) 07/30/2024    A1C 7.6 (H) 11/08/2023     (H) 11/08/2023     Your Appointments      Wednesday April 30, 2025 9:00 AM  Physical - Established with Chloe Grayson MD  [facility], [facility] ([facility]) [facility] [address] [id number] [phone]               Last Office Visit:  9--    Last Refill: 11--180 tabs with 1 refills-LUIS ENRIQUE Melissa sent to schedule appointment

## 2025-05-27 NOTE — TELEPHONE ENCOUNTER
Patient has read mychart :    \" Last read by Josh Werner at 11:22AM on 5/14/2025.\"    Please deny medication.

## 2025-06-30 ENCOUNTER — TELEPHONE (OUTPATIENT)
Facility: CLINIC | Age: 55
End: 2025-06-30

## 2025-06-30 NOTE — TELEPHONE ENCOUNTER
Last A1c value was 8.7% done 9/4/2024. ( Last Diabetes appointment w me)   Cancelled  appointment w me  No follow up on file    Overdue appointment Letter sent 1-2025 ( no response )     No recent labs; no dexcom data since      Also, No show to PCP  and 4/2025

## (undated) NOTE — LETTER
11/30/23    Dear Rosaline Little: We have attempted to contact you by phone with no success. In an effort to provide quality patient care we are reaching out to you to contact the office at your earliest convenience for message from the provider. We wanted to make sure you followed up with Infectious Disease doctor, Dr Milda Gowers. It is very important that you do follow up with this doctor. If you have established care with a different provider, please call our office so we can update your file to list the correct provider name and information. Once we update your file with this information it will eliminate further phone calls and/or correspondence from our office. Thank you for your cooperation. If you have recently contacted us back, please disregard this letter.       You may call the office at [phone] our phones are on service Monday-Thursday 8am-4:45 pm and Friday 8am-3 pm.      Thank you,      The office of Dr Casandra Pruitt

## (undated) NOTE — LETTER
09/06/23    Constance Alvarenga      To Whom It May Concern: The above patient was seen at [facility] for treatment of a medical condition from 8/14/2023-9/6/2023. The patient may return to work one week post discharge on  9/18/2023 without limitations.        Sincerely,    Danielle Hull MD

## (undated) NOTE — LETTER
01/14/25    Josh Werner  [address]  [place] [address]  7/4/1970    Mr Werner,     At [facility], patient care is our priority.  To best serve our patients, our mission is to be a partner in your healthcare.  Just as you have expectations of your providers and their office staff, they have expectations of you, their patient.  We expect that you will always communicate honestly and openly.  Once a plan of care is developed, we expect that you will follow the reasonable recommendations of your provider.  We will do our best to work with you to overcome any barriers that  your way from meeting your provider's expectations.    Your last appointment with us was on 09-  . At that time we asked that you follow up with our office in 6 weeks - and that appointment was cancelled . You have yet to repeat your blood work/labs and make a follow up appointment. We have made several attempts to contact you but we have not had success.     If you have established care with another diabetes care provider,  please contact our office so we may update your records.     Please accept this letter as a warning that if we do not hear from you within two weeks from the date on this letter that your diabetes care will be transferred back to your primary care office. After that time, your diabetes medication refills will need to be obtained from their office.     Sincerely,       Judy Rao, MSN, APRN, ANP-BC, CDCES       Copy to : Chloe Grayson MD

## (undated) NOTE — LETTER
12/17/2024       Josh Werner   [address]  [place] [address]      Dear Josh,    IF YOU ARE 50 YEARS OF AGE OR OLDER, COLORECTAL CANCER SCREENING CAN SAVE YOUR LIFE.    As your primary care doctor, I want to do everything I can to protect your health.  Colorectal cancer is the second leading cause of cancer-related deaths in the [place].  Polyps and colorectal cancer do not always cause symptoms.  That's why screening is so important.  Regular screening can find colorectal cancer early when it is most curable.  These tests can help prevent the development of colorectal cancer.  All adults 50 and older should have one of the following colon cancer screenings as recommended by the American Cancer Society:    Colonoscopy every ten years or as advised by your physician  iFOBT every year (The iFOBT is a home test to detect blood in the stool. The test is quick, easy and requires no dietary restrictions.)    Please contact my office today so together we can determine which colorectal cancer screening is best for you.  Or, if you have already had one of the above colon cancer screenings, please let me know the date, method of screening, physician and/or facility that performed the screening so I can update your medical record.      If you have a history of colon cancer, colon polyps, or an abnormal colon screening result, please follow the instructions you have previously received from myself or your specialists.    There are no excuses to ignore early detection!  This is one cancer you can prevent.  Regular exams and preventive screenings are among the most important things you can do.  Thank you for taking time to take care of yourself.        Dr. Chloe Grayson MD   [phone]

## (undated) NOTE — LETTER
Diabetes Retinal Eye Examination Report    Patient Name: Carlin Shaw                                        : 1970    Referring Physician: Robert Petersen MD  _____________________________________________________________________________  Patient - Please bring this form to your next eye examination appointment. Give it to your eye care professional to fill out and return via fax to your primary care provider. Eye Care Professional - Please fill out this form and fax it back to the referring  primary care physician.   _____________________________________________________________________________     Date of Exam: ___/___/___    The patient received a dilated fundus examination with the following results:    ___ No diabetic retinopathy detected  ___ Background retinopathy was detected, but only requires monitoring  ___ Retinopathy requiring further testing and/or treatment was detected. See notes below. Notes / Commentary / Recommendations:  _________________________________________________________________________________________________________________________________________________________________________________________________________________________________    The patient is to return for follow-up / evaluation in ____ months.     Eye Care Provider (Print): _______________________Signature___________________ Date ___ / ___/___    Clinic/Office name: _______________________Ph:_____________Fax:_____________

## (undated) NOTE — LETTER
8/6/2024       Josh Werner   [address]  [place] [address]      Dear Josh,    IF YOU ARE 50 YEARS OF AGE OR OLDER, COLORECTAL CANCER SCREENING CAN SAVE YOUR LIFE.    As your primary care doctor, I want to do everything I can to protect your health.  Colorectal cancer is the second leading cause of cancer-related deaths in the [place].  Polyps and colorectal cancer do not always cause symptoms.  That's why screening is so important.  Regular screening can find colorectal cancer early when it is most curable.  These tests can help prevent the development of colorectal cancer.  All adults 50 and older should have one of the following colon cancer screenings as recommended by the American [employer] Society:    Colonoscopy every ten years or as advised by your physician  iFOBT every year (The iFOBT is a home test to detect blood in the stool. The test is quick, easy and requires no dietary restrictions.)    Please contact my office today so together we can determine which colorectal cancer screening is best for you.  Or, if you have already had one of the above colon cancer screenings, please let me know the date, method of screening, physician and/or facility that performed the screening so I can update your medical record.      If you have a history of colon cancer, colon polyps, or an abnormal colon screening result, please follow the instructions you have previously received from myself or your specialists.    There are no excuses to ignore early detection!  This is one cancer you can prevent.  Regular exams and preventive screenings are among the most important things you can do.  Thank you for taking time to take care of yourself.        Dr. Chloe Grayson MD   [phone]

## (undated) NOTE — LETTER
09/06/23    Zachary Nieves      To Whom It May Concern: This letter has been written at the patient's request. The above patient was seen at [facility] for treatment of a critical medical condition from August 14 th, 2023 until September 6th, 2023. The patient may return to work after 2-3 weeks depending on the PT/OT progress.       Sincerely,        ARIANE Hodgson  09/06/23, 12:51 PM

## (undated) NOTE — LETTER
Diabetes Retinal Eye Examination Report    Patient Name: Josh Werner                                        : 1970    Referring Physician: Chloe Grayson MD  _____________________________________________________________________________  Patient - Please bring this form to your next eye examination appointment.   Give it to your eye care professional to fill out and return via fax to your primary care provider.     Eye Care Professional - Please fill out this form and fax it back to the referring  primary care physician.   _____________________________________________________________________________     Date of Exam: ___/___/___    The patient received a dilated fundus examination with the following results:    ___ No diabetic retinopathy detected  ___ Background retinopathy was detected, but only requires monitoring  ___ Retinopathy requiring further testing and/or treatment was detected. See notes below.    Notes / Commentary / Recommendations:  _________________________________________________________________________________________________________________________________________________________________________________________________________________________________    The patient is to return for follow-up / evaluation in ____ months.    Eye Care Provider (Print): _______________________Signature___________________ Date ___ / ___/___    Clinic/Office name: _______________________Ph:_____________Fax:_____________

## (undated) NOTE — LETTER
8/6/2024       Josh Werner   [address]  [place] [address]      Dear Josh,    IF YOU ARE 50 YEARS OF AGE OR OLDER, COLORECTAL CANCER SCREENING CAN SAVE YOUR LIFE.    As your primary care doctor, I want to do everything I can to protect your health.  Colorectal cancer is the second leading cause of cancer-related deaths in the [place].  Polyps and colorectal cancer do not always cause symptoms.  That's why screening is so important.  Regular screening can find colorectal cancer early when it is most curable.  These tests can help prevent the development of colorectal cancer.  All adults 50 and older should have one of the following colon cancer screenings as recommended by the American [employer] Society:    Colonoscopy every ten years or as advised by your physician  iFOBT every year (The iFOBT is a home test to detect blood in the stool. The test is quick, easy and requires no dietary restrictions.)    Please contact my office today so together we can determine which colorectal cancer screening is best for you.  Or, if you have already had one of the above colon cancer screenings, please let me know the date, method of screening, physician and/or facility that performed the screening so I can update your medical record.      If you have a history of colon cancer, colon polyps, or an abnormal colon screening result, please follow the instructions you have previously received from myself or your specialists.    There are no excuses to ignore early detection!  This is one cancer you can prevent.  Regular exams and preventive screenings are among the most important things you can do.  Thank you for taking time to take care of yourself.        Dr. Chloe Grayson MD   [phone]